# Patient Record
Sex: FEMALE | Race: BLACK OR AFRICAN AMERICAN | Employment: FULL TIME | URBAN - METROPOLITAN AREA
[De-identification: names, ages, dates, MRNs, and addresses within clinical notes are randomized per-mention and may not be internally consistent; named-entity substitution may affect disease eponyms.]

---

## 2017-03-30 PROBLEM — R09.89 ABNORMAL CAROTID PULSE: Status: ACTIVE | Noted: 2017-03-30

## 2018-05-25 ENCOUNTER — HOSPITAL ENCOUNTER (EMERGENCY)
Age: 53
Discharge: HOME | End: 2018-05-25
Payer: COMMERCIAL

## 2018-05-25 ENCOUNTER — APPOINTMENT (OUTPATIENT)
Dept: GENERAL RADIOLOGY | Age: 53
End: 2018-05-25
Payer: COMMERCIAL

## 2018-05-25 VITALS
DIASTOLIC BLOOD PRESSURE: 84 MMHG | BODY MASS INDEX: 47.8 KG/M2 | RESPIRATION RATE: 15 BRPM | HEART RATE: 76 BPM | WEIGHT: 280 LBS | TEMPERATURE: 98 F | OXYGEN SATURATION: 97 % | SYSTOLIC BLOOD PRESSURE: 178 MMHG | HEIGHT: 64 IN

## 2018-05-25 DIAGNOSIS — Z76.0 ENCOUNTER FOR MEDICATION REFILL: ICD-10-CM

## 2018-05-25 DIAGNOSIS — M17.12 PRIMARY OSTEOARTHRITIS OF LEFT KNEE: ICD-10-CM

## 2018-05-25 DIAGNOSIS — M25.562 ACUTE PAIN OF LEFT KNEE: Primary | ICD-10-CM

## 2018-05-25 DIAGNOSIS — F32.A DEPRESSION: ICD-10-CM

## 2018-05-25 DIAGNOSIS — I10 HYPERTENSION: ICD-10-CM

## 2018-05-25 DIAGNOSIS — K21.9 GERD (GASTROESOPHAGEAL REFLUX DISEASE): ICD-10-CM

## 2018-05-25 PROCEDURE — 73564 X-RAY EXAM KNEE 4 OR MORE: CPT

## 2018-05-25 PROCEDURE — 6370000000 HC RX 637 (ALT 250 FOR IP): Performed by: NURSE PRACTITIONER

## 2018-05-25 PROCEDURE — 99283 EMERGENCY DEPT VISIT LOW MDM: CPT

## 2018-05-25 RX ORDER — HYDROCHLOROTHIAZIDE 25 MG/1
25 TABLET ORAL DAILY
Qty: 30 TABLET | Refills: 0 | Status: SHIPPED | OUTPATIENT
Start: 2018-05-25 | End: 2022-10-30

## 2018-05-25 RX ORDER — HYDROCODONE BITARTRATE AND ACETAMINOPHEN 5; 325 MG/1; MG/1
1 TABLET ORAL ONCE
Status: COMPLETED | OUTPATIENT
Start: 2018-05-25 | End: 2018-05-25

## 2018-05-25 RX ORDER — IBUPROFEN 800 MG/1
800 TABLET ORAL ONCE
Status: COMPLETED | OUTPATIENT
Start: 2018-05-25 | End: 2018-05-25

## 2018-05-25 RX ORDER — IBUPROFEN 800 MG/1
800 TABLET ORAL EVERY 8 HOURS PRN
Qty: 21 TABLET | Refills: 0 | Status: SHIPPED | OUTPATIENT
Start: 2018-05-25 | End: 2018-06-01

## 2018-05-25 RX ORDER — AMLODIPINE BESYLATE 10 MG/1
10 TABLET ORAL DAILY
Qty: 30 TABLET | Refills: 0 | Status: SHIPPED | OUTPATIENT
Start: 2018-05-25 | End: 2022-10-30

## 2018-05-25 RX ADMIN — IBUPROFEN 800 MG: 800 TABLET ORAL at 11:58

## 2018-05-25 RX ADMIN — HYDROCODONE BITARTRATE AND ACETAMINOPHEN 1 TABLET: 5; 325 TABLET ORAL at 11:58

## 2018-05-25 ASSESSMENT — PAIN DESCRIPTION - DESCRIPTORS: DESCRIPTORS: ACHING

## 2018-05-25 ASSESSMENT — PAIN DESCRIPTION - PAIN TYPE: TYPE: ACUTE PAIN

## 2018-05-25 ASSESSMENT — PAIN DESCRIPTION - LOCATION: LOCATION: KNEE

## 2018-05-25 ASSESSMENT — PAIN SCALES - GENERAL
PAINLEVEL_OUTOF10: 10
PAINLEVEL_OUTOF10: 8

## 2018-05-25 ASSESSMENT — PAIN DESCRIPTION - FREQUENCY: FREQUENCY: CONTINUOUS

## 2018-05-25 ASSESSMENT — PAIN DESCRIPTION - ORIENTATION: ORIENTATION: LEFT

## 2019-02-26 ENCOUNTER — HOSPITAL ENCOUNTER (EMERGENCY)
Age: 54
Discharge: HOME | End: 2019-02-26

## 2019-02-26 VITALS
WEIGHT: 208 LBS | DIASTOLIC BLOOD PRESSURE: 93 MMHG | TEMPERATURE: 99.6 F | BODY MASS INDEX: 34.66 KG/M2 | HEART RATE: 107 BPM | HEIGHT: 65 IN | OXYGEN SATURATION: 97 % | RESPIRATION RATE: 20 BRPM | SYSTOLIC BLOOD PRESSURE: 161 MMHG

## 2019-02-26 DIAGNOSIS — K04.7 DENTAL ABSCESS: Primary | ICD-10-CM

## 2019-02-26 PROCEDURE — 99282 EMERGENCY DEPT VISIT SF MDM: CPT

## 2022-10-30 ENCOUNTER — HOSPITAL ENCOUNTER (INPATIENT)
Age: 57
LOS: 1 days | Discharge: HOME | DRG: 291 | End: 2022-11-02
Attending: EMERGENCY MEDICINE | Admitting: INTERNAL MEDICINE
Payer: COMMERCIAL

## 2022-10-30 ENCOUNTER — APPOINTMENT (OUTPATIENT)
Dept: GENERAL RADIOLOGY | Age: 57
DRG: 291 | End: 2022-10-30
Payer: COMMERCIAL

## 2022-10-30 ENCOUNTER — APPOINTMENT (OUTPATIENT)
Dept: NUCLEAR MEDICINE | Age: 57
DRG: 291 | End: 2022-10-30
Payer: COMMERCIAL

## 2022-10-30 DIAGNOSIS — I50.9 ACUTE CONGESTIVE HEART FAILURE, UNSPECIFIED HEART FAILURE TYPE (HCC): ICD-10-CM

## 2022-10-30 DIAGNOSIS — F32.A DEPRESSION: ICD-10-CM

## 2022-10-30 DIAGNOSIS — J98.01 ACUTE BRONCHOSPASM: ICD-10-CM

## 2022-10-30 DIAGNOSIS — J30.9 ALLERGIC RHINITIS: ICD-10-CM

## 2022-10-30 DIAGNOSIS — J10.1 INFLUENZA B: Primary | ICD-10-CM

## 2022-10-30 DIAGNOSIS — J45.909 ASTHMA: ICD-10-CM

## 2022-10-30 DIAGNOSIS — J81.0 ACUTE PULMONARY EDEMA (HCC): ICD-10-CM

## 2022-10-30 PROBLEM — I50.43 CHF (CONGESTIVE HEART FAILURE), NYHA CLASS I, ACUTE ON CHRONIC, COMBINED (HCC): Status: ACTIVE | Noted: 2022-10-30

## 2022-10-30 LAB
ACETAMINOPHEN LEVEL: <5 MCG/ML (ref 10–30)
ALBUMIN SERPL-MCNC: 3.9 G/DL (ref 3.5–5.2)
ALP BLD-CCNC: 97 U/L (ref 35–104)
ALT SERPL-CCNC: 11 U/L (ref 0–32)
AMPHETAMINE SCREEN, URINE: NOT DETECTED
ANION GAP SERPL CALCULATED.3IONS-SCNC: 10 MMOL/L (ref 7–16)
ANION GAP SERPL CALCULATED.3IONS-SCNC: 14 MMOL/L (ref 7–16)
AST SERPL-CCNC: 17 U/L (ref 0–31)
BARBITURATE SCREEN URINE: NOT DETECTED
BASOPHILS ABSOLUTE: 0.04 E9/L (ref 0–0.2)
BASOPHILS RELATIVE PERCENT: 0.8 % (ref 0–2)
BENZODIAZEPINE SCREEN, URINE: NOT DETECTED
BILIRUB SERPL-MCNC: 0.5 MG/DL (ref 0–1.2)
BUN BLDV-MCNC: 10 MG/DL (ref 6–20)
BUN BLDV-MCNC: 11 MG/DL (ref 6–20)
C-REACTIVE PROTEIN: 0.3 MG/DL (ref 0–0.4)
CALCIUM SERPL-MCNC: 9.3 MG/DL (ref 8.6–10.2)
CALCIUM SERPL-MCNC: 9.4 MG/DL (ref 8.6–10.2)
CANNABINOID SCREEN URINE: NOT DETECTED
CHLORIDE BLD-SCNC: 105 MMOL/L (ref 98–107)
CHLORIDE BLD-SCNC: 110 MMOL/L (ref 98–107)
CHOLESTEROL, TOTAL: 252 MG/DL (ref 0–199)
CO2: 23 MMOL/L (ref 22–29)
CO2: 24 MMOL/L (ref 22–29)
COCAINE METABOLITE SCREEN URINE: NOT DETECTED
CREAT SERPL-MCNC: 0.7 MG/DL (ref 0.5–1)
CREAT SERPL-MCNC: 0.7 MG/DL (ref 0.5–1)
D DIMER: 934 NG/ML DDU
EOSINOPHILS ABSOLUTE: 0.22 E9/L (ref 0.05–0.5)
EOSINOPHILS RELATIVE PERCENT: 4.2 % (ref 0–6)
ETHANOL: <10 MG/DL (ref 0–0.08)
FENTANYL SCREEN, URINE: NOT DETECTED
GFR SERPL CREATININE-BSD FRML MDRD: >60 ML/MIN/1.73
GFR SERPL CREATININE-BSD FRML MDRD: >60 ML/MIN/1.73
GLUCOSE BLD-MCNC: 162 MG/DL (ref 74–99)
GLUCOSE BLD-MCNC: 91 MG/DL (ref 74–99)
HBA1C MFR BLD: 5.1 % (ref 4–5.6)
HCT VFR BLD CALC: 33.1 % (ref 34–48)
HDLC SERPL-MCNC: 86 MG/DL
HEMOGLOBIN: 10.9 G/DL (ref 11.5–15.5)
IMMATURE GRANULOCYTES #: 0.02 E9/L
IMMATURE GRANULOCYTES %: 0.4 % (ref 0–5)
INFLUENZA A BY PCR: NOT DETECTED
INFLUENZA B BY PCR: DETECTED
LDL CHOLESTEROL CALCULATED: 154 MG/DL (ref 0–99)
LYMPHOCYTES ABSOLUTE: 2.06 E9/L (ref 1.5–4)
LYMPHOCYTES RELATIVE PERCENT: 38.9 % (ref 20–42)
Lab: NORMAL
MCH RBC QN AUTO: 29.4 PG (ref 26–35)
MCHC RBC AUTO-ENTMCNC: 32.9 % (ref 32–34.5)
MCV RBC AUTO: 89.2 FL (ref 80–99.9)
METHADONE SCREEN, URINE: NOT DETECTED
MONOCYTES ABSOLUTE: 0.49 E9/L (ref 0.1–0.95)
MONOCYTES RELATIVE PERCENT: 9.3 % (ref 2–12)
NEUTROPHILS ABSOLUTE: 2.46 E9/L (ref 1.8–7.3)
NEUTROPHILS RELATIVE PERCENT: 46.4 % (ref 43–80)
OPIATE SCREEN URINE: NOT DETECTED
OXYCODONE URINE: NOT DETECTED
PDW BLD-RTO: 12.7 FL (ref 11.5–15)
PHENCYCLIDINE SCREEN URINE: NOT DETECTED
PLATELET # BLD: 235 E9/L (ref 130–450)
PMV BLD AUTO: 11.4 FL (ref 7–12)
POTASSIUM REFLEX MAGNESIUM: 3.8 MMOL/L (ref 3.5–5)
POTASSIUM SERPL-SCNC: 3.5 MMOL/L (ref 3.5–5)
PRO-BNP: 1931 PG/ML (ref 0–125)
PROCALCITONIN: 0.1 NG/ML (ref 0–0.08)
RBC # BLD: 3.71 E12/L (ref 3.5–5.5)
SALICYLATE, SERUM: <0.3 MG/DL (ref 0–30)
SARS-COV-2, NAAT: NOT DETECTED
SODIUM BLD-SCNC: 142 MMOL/L (ref 132–146)
SODIUM BLD-SCNC: 144 MMOL/L (ref 132–146)
TOTAL PROTEIN: 7.2 G/DL (ref 6.4–8.3)
TRICYCLIC ANTIDEPRESSANTS SCREEN SERUM: NEGATIVE NG/ML
TRIGL SERPL-MCNC: 59 MG/DL (ref 0–149)
TROPONIN, HIGH SENSITIVITY: 10 NG/L (ref 0–9)
TROPONIN, HIGH SENSITIVITY: 13 NG/L (ref 0–9)
TROPONIN, HIGH SENSITIVITY: 13 NG/L (ref 0–9)
TSH SERPL DL<=0.05 MIU/L-ACNC: 0.78 UIU/ML (ref 0.27–4.2)
VLDLC SERPL CALC-MCNC: 12 MG/DL
WBC # BLD: 5.3 E9/L (ref 4.5–11.5)

## 2022-10-30 PROCEDURE — 82077 ASSAY SPEC XCP UR&BREATH IA: CPT

## 2022-10-30 PROCEDURE — 6370000000 HC RX 637 (ALT 250 FOR IP): Performed by: STUDENT IN AN ORGANIZED HEALTH CARE EDUCATION/TRAINING PROGRAM

## 2022-10-30 PROCEDURE — 36415 COLL VENOUS BLD VENIPUNCTURE: CPT

## 2022-10-30 PROCEDURE — 84484 ASSAY OF TROPONIN QUANT: CPT

## 2022-10-30 PROCEDURE — 93005 ELECTROCARDIOGRAM TRACING: CPT | Performed by: EMERGENCY MEDICINE

## 2022-10-30 PROCEDURE — 94640 AIRWAY INHALATION TREATMENT: CPT

## 2022-10-30 PROCEDURE — 71046 X-RAY EXAM CHEST 2 VIEWS: CPT

## 2022-10-30 PROCEDURE — 80307 DRUG TEST PRSMV CHEM ANLYZR: CPT

## 2022-10-30 PROCEDURE — 6370000000 HC RX 637 (ALT 250 FOR IP): Performed by: EMERGENCY MEDICINE

## 2022-10-30 PROCEDURE — 85378 FIBRIN DEGRADE SEMIQUANT: CPT

## 2022-10-30 PROCEDURE — 80048 BASIC METABOLIC PNL TOTAL CA: CPT

## 2022-10-30 PROCEDURE — 80179 DRUG ASSAY SALICYLATE: CPT

## 2022-10-30 PROCEDURE — 3430000000 HC RX DIAGNOSTIC RADIOPHARMACEUTICAL: Performed by: RADIOLOGY

## 2022-10-30 PROCEDURE — 6360000002 HC RX W HCPCS: Performed by: STUDENT IN AN ORGANIZED HEALTH CARE EDUCATION/TRAINING PROGRAM

## 2022-10-30 PROCEDURE — 80053 COMPREHEN METABOLIC PANEL: CPT

## 2022-10-30 PROCEDURE — G0378 HOSPITAL OBSERVATION PER HR: HCPCS

## 2022-10-30 PROCEDURE — 84443 ASSAY THYROID STIM HORMONE: CPT

## 2022-10-30 PROCEDURE — 96375 TX/PRO/DX INJ NEW DRUG ADDON: CPT

## 2022-10-30 PROCEDURE — 86140 C-REACTIVE PROTEIN: CPT

## 2022-10-30 PROCEDURE — 83036 HEMOGLOBIN GLYCOSYLATED A1C: CPT

## 2022-10-30 PROCEDURE — 85025 COMPLETE CBC W/AUTO DIFF WBC: CPT

## 2022-10-30 PROCEDURE — A9540 TC99M MAA: HCPCS | Performed by: RADIOLOGY

## 2022-10-30 PROCEDURE — 80143 DRUG ASSAY ACETAMINOPHEN: CPT

## 2022-10-30 PROCEDURE — 80061 LIPID PANEL: CPT

## 2022-10-30 PROCEDURE — 2580000003 HC RX 258: Performed by: STUDENT IN AN ORGANIZED HEALTH CARE EDUCATION/TRAINING PROGRAM

## 2022-10-30 PROCEDURE — 6360000002 HC RX W HCPCS: Performed by: EMERGENCY MEDICINE

## 2022-10-30 PROCEDURE — 99285 EMERGENCY DEPT VISIT HI MDM: CPT

## 2022-10-30 PROCEDURE — A9539 TC99M PENTETATE: HCPCS | Performed by: RADIOLOGY

## 2022-10-30 PROCEDURE — 96374 THER/PROPH/DIAG INJ IV PUSH: CPT

## 2022-10-30 PROCEDURE — 78582 LUNG VENTILAT&PERFUS IMAGING: CPT

## 2022-10-30 PROCEDURE — 83880 ASSAY OF NATRIURETIC PEPTIDE: CPT

## 2022-10-30 PROCEDURE — 84145 PROCALCITONIN (PCT): CPT

## 2022-10-30 PROCEDURE — 87502 INFLUENZA DNA AMP PROBE: CPT

## 2022-10-30 PROCEDURE — 87635 SARS-COV-2 COVID-19 AMP PRB: CPT

## 2022-10-30 RX ORDER — ONDANSETRON 4 MG/1
4 TABLET, ORALLY DISINTEGRATING ORAL EVERY 8 HOURS PRN
Status: DISCONTINUED | OUTPATIENT
Start: 2022-10-30 | End: 2022-11-02 | Stop reason: HOSPADM

## 2022-10-30 RX ORDER — ACETAMINOPHEN 325 MG/1
650 TABLET ORAL EVERY 6 HOURS PRN
Status: DISCONTINUED | OUTPATIENT
Start: 2022-10-30 | End: 2022-11-02 | Stop reason: HOSPADM

## 2022-10-30 RX ORDER — ASPIRIN 325 MG
325 TABLET ORAL ONCE
Status: COMPLETED | OUTPATIENT
Start: 2022-10-30 | End: 2022-10-30

## 2022-10-30 RX ORDER — SODIUM CHLORIDE 9 MG/ML
INJECTION, SOLUTION INTRAVENOUS PRN
Status: DISCONTINUED | OUTPATIENT
Start: 2022-10-30 | End: 2022-11-02 | Stop reason: HOSPADM

## 2022-10-30 RX ORDER — EZETIMIBE 10 MG/1
10 TABLET ORAL NIGHTLY
Status: DISCONTINUED | OUTPATIENT
Start: 2022-10-30 | End: 2022-10-30

## 2022-10-30 RX ORDER — DEXTROSE MONOHYDRATE 100 MG/ML
INJECTION, SOLUTION INTRAVENOUS CONTINUOUS PRN
Status: DISCONTINUED | OUTPATIENT
Start: 2022-10-30 | End: 2022-11-02 | Stop reason: HOSPADM

## 2022-10-30 RX ORDER — SODIUM CHLORIDE 0.9 % (FLUSH) 0.9 %
5-40 SYRINGE (ML) INJECTION EVERY 12 HOURS SCHEDULED
Status: DISCONTINUED | OUTPATIENT
Start: 2022-10-30 | End: 2022-11-02 | Stop reason: HOSPADM

## 2022-10-30 RX ORDER — ENOXAPARIN SODIUM 100 MG/ML
30 INJECTION SUBCUTANEOUS 2 TIMES DAILY
Status: COMPLETED | OUTPATIENT
Start: 2022-10-30 | End: 2022-11-01

## 2022-10-30 RX ORDER — FUROSEMIDE 10 MG/ML
40 INJECTION INTRAMUSCULAR; INTRAVENOUS DAILY
Status: DISCONTINUED | OUTPATIENT
Start: 2022-10-31 | End: 2022-11-02 | Stop reason: HOSPADM

## 2022-10-30 RX ORDER — IPRATROPIUM BROMIDE AND ALBUTEROL SULFATE 2.5; .5 MG/3ML; MG/3ML
1 SOLUTION RESPIRATORY (INHALATION)
Status: DISCONTINUED | OUTPATIENT
Start: 2022-10-30 | End: 2022-11-01

## 2022-10-30 RX ORDER — BUDESONIDE 0.5 MG/2ML
0.5 INHALANT ORAL 2 TIMES DAILY
Status: DISCONTINUED | OUTPATIENT
Start: 2022-10-30 | End: 2022-11-02 | Stop reason: HOSPADM

## 2022-10-30 RX ORDER — ACETAMINOPHEN 650 MG/1
650 SUPPOSITORY RECTAL EVERY 6 HOURS PRN
Status: DISCONTINUED | OUTPATIENT
Start: 2022-10-30 | End: 2022-11-02 | Stop reason: HOSPADM

## 2022-10-30 RX ORDER — LABETALOL HYDROCHLORIDE 5 MG/ML
20 INJECTION, SOLUTION INTRAVENOUS ONCE
Status: COMPLETED | OUTPATIENT
Start: 2022-10-30 | End: 2022-10-30

## 2022-10-30 RX ORDER — SODIUM CHLORIDE 0.9 % (FLUSH) 0.9 %
5-40 SYRINGE (ML) INJECTION PRN
Status: DISCONTINUED | OUTPATIENT
Start: 2022-10-30 | End: 2022-11-02 | Stop reason: HOSPADM

## 2022-10-30 RX ORDER — ROSUVASTATIN CALCIUM 20 MG/1
20 TABLET, COATED ORAL NIGHTLY
Status: DISCONTINUED | OUTPATIENT
Start: 2022-10-30 | End: 2022-11-02 | Stop reason: HOSPADM

## 2022-10-30 RX ORDER — FUROSEMIDE 10 MG/ML
40 INJECTION INTRAMUSCULAR; INTRAVENOUS ONCE
Status: COMPLETED | OUTPATIENT
Start: 2022-10-30 | End: 2022-10-30

## 2022-10-30 RX ORDER — POLYETHYLENE GLYCOL 3350 17 G/17G
17 POWDER, FOR SOLUTION ORAL DAILY PRN
Status: DISCONTINUED | OUTPATIENT
Start: 2022-10-30 | End: 2022-11-02 | Stop reason: HOSPADM

## 2022-10-30 RX ORDER — KIT FOR THE PREPARATION OF TECHNETIUM TC 99M PENTETATE 20 MG/1
34.3 INJECTION, POWDER, LYOPHILIZED, FOR SOLUTION INTRAVENOUS; RESPIRATORY (INHALATION)
Status: COMPLETED | OUTPATIENT
Start: 2022-10-30 | End: 2022-10-30

## 2022-10-30 RX ORDER — HYDRALAZINE HYDROCHLORIDE 20 MG/ML
10 INJECTION INTRAMUSCULAR; INTRAVENOUS EVERY 6 HOURS PRN
Status: DISCONTINUED | OUTPATIENT
Start: 2022-10-30 | End: 2022-11-02 | Stop reason: HOSPADM

## 2022-10-30 RX ORDER — METHYLPREDNISOLONE SODIUM SUCCINATE 125 MG/2ML
125 INJECTION, POWDER, LYOPHILIZED, FOR SOLUTION INTRAMUSCULAR; INTRAVENOUS ONCE
Status: COMPLETED | OUTPATIENT
Start: 2022-10-30 | End: 2022-10-30

## 2022-10-30 RX ORDER — ONDANSETRON 2 MG/ML
4 INJECTION INTRAMUSCULAR; INTRAVENOUS EVERY 6 HOURS PRN
Status: DISCONTINUED | OUTPATIENT
Start: 2022-10-30 | End: 2022-11-02 | Stop reason: HOSPADM

## 2022-10-30 RX ORDER — ARFORMOTEROL TARTRATE 15 UG/2ML
15 SOLUTION RESPIRATORY (INHALATION) 2 TIMES DAILY
Status: DISCONTINUED | OUTPATIENT
Start: 2022-10-30 | End: 2022-11-02 | Stop reason: HOSPADM

## 2022-10-30 RX ORDER — LABETALOL HYDROCHLORIDE 5 MG/ML
10 INJECTION, SOLUTION INTRAVENOUS EVERY 4 HOURS PRN
Status: DISCONTINUED | OUTPATIENT
Start: 2022-10-30 | End: 2022-11-02 | Stop reason: HOSPADM

## 2022-10-30 RX ORDER — IPRATROPIUM BROMIDE AND ALBUTEROL SULFATE 2.5; .5 MG/3ML; MG/3ML
3 SOLUTION RESPIRATORY (INHALATION) ONCE
Status: COMPLETED | OUTPATIENT
Start: 2022-10-30 | End: 2022-10-30

## 2022-10-30 RX ADMIN — HYDRALAZINE HYDROCHLORIDE 10 MG: 20 INJECTION INTRAMUSCULAR; INTRAVENOUS at 17:26

## 2022-10-30 RX ADMIN — IPRATROPIUM BROMIDE AND ALBUTEROL SULFATE 3 AMPULE: .5; 3 SOLUTION RESPIRATORY (INHALATION) at 11:02

## 2022-10-30 RX ADMIN — ACETAMINOPHEN 650 MG: 325 TABLET, FILM COATED ORAL at 16:47

## 2022-10-30 RX ADMIN — ARFORMOTEROL TARTRATE 15 MCG: 15 SOLUTION RESPIRATORY (INHALATION) at 20:43

## 2022-10-30 RX ADMIN — ROSUVASTATIN 20 MG: 20 TABLET, FILM COATED ORAL at 21:37

## 2022-10-30 RX ADMIN — Medication 5.5 MILLICURIE: at 13:45

## 2022-10-30 RX ADMIN — Medication 10 ML: at 21:39

## 2022-10-30 RX ADMIN — BUDESONIDE INHALATION SUSPENSION 500 MCG: 0.5 SUSPENSION RESPIRATORY (INHALATION) at 20:42

## 2022-10-30 RX ADMIN — ENOXAPARIN SODIUM 30 MG: 100 INJECTION SUBCUTANEOUS at 21:38

## 2022-10-30 RX ADMIN — LABETALOL HYDROCHLORIDE 10 MG: 5 INJECTION, SOLUTION INTRAVENOUS at 16:48

## 2022-10-30 RX ADMIN — IPRATROPIUM BROMIDE AND ALBUTEROL SULFATE 1 AMPULE: .5; 2.5 SOLUTION RESPIRATORY (INHALATION) at 20:42

## 2022-10-30 RX ADMIN — METHYLPREDNISOLONE SODIUM SUCCINATE 125 MG: 125 INJECTION, POWDER, FOR SOLUTION INTRAMUSCULAR; INTRAVENOUS at 10:50

## 2022-10-30 RX ADMIN — LABETALOL HYDROCHLORIDE 20 MG: 5 INJECTION, SOLUTION INTRAVENOUS at 14:24

## 2022-10-30 RX ADMIN — ASPIRIN 325 MG: 325 TABLET ORAL at 09:49

## 2022-10-30 RX ADMIN — FUROSEMIDE 40 MG: 10 INJECTION, SOLUTION INTRAMUSCULAR; INTRAVENOUS at 21:38

## 2022-10-30 RX ADMIN — ACETAMINOPHEN 650 MG: 325 TABLET, FILM COATED ORAL at 21:51

## 2022-10-30 RX ADMIN — KIT FOR THE PREPARATION OF TECHNETIUM TC 99M PENTETATE 34.3 MILLICURIE: 20 INJECTION, POWDER, LYOPHILIZED, FOR SOLUTION INTRAVENOUS; RESPIRATORY (INHALATION) at 13:39

## 2022-10-30 RX ADMIN — HYDRALAZINE HYDROCHLORIDE 10 MG: 20 INJECTION INTRAMUSCULAR; INTRAVENOUS at 21:51

## 2022-10-30 RX ADMIN — FUROSEMIDE 40 MG: 10 INJECTION, SOLUTION INTRAMUSCULAR; INTRAVENOUS at 14:28

## 2022-10-30 ASSESSMENT — PAIN - FUNCTIONAL ASSESSMENT: PAIN_FUNCTIONAL_ASSESSMENT: NONE - DENIES PAIN

## 2022-10-30 ASSESSMENT — PAIN SCALES - GENERAL
PAINLEVEL_OUTOF10: 8
PAINLEVEL_OUTOF10: 6

## 2022-10-30 NOTE — Clinical Note
Discharge Plan[de-identified] Other/[facility])   Telemetry/Cardiac Monitoring Required?: Yes

## 2022-10-30 NOTE — ED PROVIDER NOTES
Department of Emergency Medicine   ED  Provider Note  Admit Date/RoomTime: 10/30/2022  8:54 AM  ED Room: [facility]          History of Present Illness:  10/30/22, Time: 9:17 AM EDT  Chief Complaint   Patient presents with    Shortness of Breath     Started 2 weeks ago    Cough     Pt had fever and cough and headache 3 weeks ago and it has turned into SOB. Pt stopped all meds in 2018                Maria R Herrera is a 59 y.o. female presenting to the ED for shortness of breath, beginning 2-3 weeks ago. The complaint has been constant, moderate in severity, and worsened by activity. Patient reports worsening shortness of breath for the last 2 weeks. She reports nonproductive cough with wheezing but also reports orthopnea and fluid retention. She says she is getting worse. She says she does not know what is wrong. She reports that she seems to becoming more short of breath and having increased tightness in her chest for the last 3 weeks. She said 3 weeks ago she had a fever but nothing over the last 2 weeks. She reports she seems to be congested. She denies abdominal pain. She denies back pain. She denies any history of DVT or PE. She has a history of asthma. Review of Systems:   A complete review of systems was performed and pertinent positives and negatives are stated within HPI, all other systems reviewed and are negative.        --------------------------------------------- PAST HISTORY ---------------------------------------------  Past Medical History:  has a past medical history of Abnormal carotid pulse, Asthma, Depression, Dyslipidemia, Hypertension, Mitral valve prolapse, Obesity, and Osteoarthritis. Past Surgical History:  has a past surgical history that includes Hysterectomy (); knee surgery; Tonsillectomy (childhood);  section; Incisional hernia repair (12); and Abdomen surgery. Social History:  reports that she has never smoked.  She has never used smokeless tobacco. She reports current alcohol use. She reports that she does not use drugs. Family History: family history includes Arthritis in her mother and sister; Asthma in her mother and sister; Cancer in her father; Diabetes in her father, mother, and sister; Heart Disease in her mother; High Blood Pressure in her father, mother, sister, sister, sister, and sister; Mental Illness in her father and sister; Stroke in her father and mother. . Unless otherwise noted, family history is non contributory    The patients home medications have been reviewed. Allergies: Doxycycline, Iodine, and Iodides    I have reviewed the past medical history, past surgical history, social history, and family history    ---------------------------------------------------PHYSICAL EXAM--------------------------------------    Constitutional/General: Alert and oriented x3  Head: Normocephalic and atraumatic  Eyes: PERRL, EOMI, sclera non icteric  ENT: Oropharynx clear, handling secretions, no trismus  Neck: Supple, full ROM, no stridor, no meningeal signs  Respiratory: Lungs scattered wheezes bilaterally and rales bilaterally. Not in respiratory distress  Cardiovascular:  Regular rate. Regular rhythm. No murmurs, no gallops, no rubs. 2+ distal pulses. Equal extremity pulses. Gastrointestinal:  Abdomen Soft, Non tender, Non distended. No rebound, guarding, or rigidity. No pulsatile masses. Musculoskeletal: Moves all extremities x 4. Warm and well perfused, no clubbing, no cyanosis, no edema. Capillary refill <3 seconds  Skin: skin warm and dry. No rashes. Neurologic: GCS 15, no focal deficits, symmetric strength 5/5 in the upper and lower extremities bilaterally  Psychiatric: Normal Affect          -------------------------------------------------- RESULTS -------------------------------------------------  Results are listed below.      LABS: (Lab results interpreted by me)  Results for orders placed or performed during the hospital encounter of 10/30/22   COVID-19, Rapid    Specimen: Nasopharyngeal Swab   Result Value Ref Range    SARS-CoV-2, NAAT Not Detected Not Detected   RAPID INFLUENZA A/B ANTIGENS    Specimen: Nasopharyngeal   Result Value Ref Range    Influenza A by PCR Not Detected Not Detected    Influenza B by PCR DETECTED (A) Not Detected   CBC with Auto Differential   Result Value Ref Range    WBC 5.3 4.5 - 11.5 E9/L    RBC 3.71 3.50 - 5.50 E12/L    Hemoglobin 10.9 (L) 11.5 - 15.5 g/dL    Hematocrit 33.1 (L) 34.0 - 48.0 %    MCV 89.2 80.0 - 99.9 fL    MCH 29.4 26.0 - 35.0 pg    MCHC 32.9 32.0 - 34.5 %    RDW 12.7 11.5 - 15.0 fL    Platelets 730 650 - 603 E9/L    MPV 11.4 7.0 - 12.0 fL    Neutrophils % 46.4 43.0 - 80.0 %    Immature Granulocytes % 0.4 0.0 - 5.0 %    Lymphocytes % 38.9 20.0 - 42.0 %    Monocytes % 9.3 2.0 - 12.0 %    Eosinophils % 4.2 0.0 - 6.0 %    Basophils % 0.8 0.0 - 2.0 %    Neutrophils Absolute 2.46 1.80 - 7.30 E9/L    Immature Granulocytes # 0.02 E9/L    Lymphocytes Absolute 2.06 1.50 - 4.00 E9/L    Monocytes Absolute 0.49 0.10 - 0.95 E9/L    Eosinophils Absolute 0.22 0.05 - 0.50 E9/L    Basophils Absolute 0.04 0.00 - 0.20 E9/L   Comprehensive Metabolic Panel w/ Reflex to MG   Result Value Ref Range    Sodium 144 132 - 146 mmol/L    Potassium reflex Magnesium 3.8 3.5 - 5.0 mmol/L    Chloride 110 (H) 98 - 107 mmol/L    CO2 24 22 - 29 mmol/L    Anion Gap 10 7 - 16 mmol/L    Glucose 91 74 - 99 mg/dL    BUN 11 6 - 20 mg/dL    Creatinine 0.7 0.5 - 1.0 mg/dL    Est, Glom Filt Rate >60 >=60 mL/min/1.73    Calcium 9.4 8.6 - 10.2 mg/dL    Total Protein 7.2 6.4 - 8.3 g/dL    Albumin 3.9 3.5 - 5.2 g/dL    Total Bilirubin 0.5 0.0 - 1.2 mg/dL    Alkaline Phosphatase 97 35 - 104 U/L    ALT 11 0 - 32 U/L    AST 17 0 - 31 U/L   Troponin   Result Value Ref Range    Troponin, High Sensitivity 13 (H) 0 - 9 ng/L   Brain Natriuretic Peptide   Result Value Ref Range    Pro-BNP 1,931 (H) 0 - 125 pg/mL   D-Dimer, Quantitative Result Value Ref Range    D-Dimer, Quant 934 ng/mL DDU   Troponin   Result Value Ref Range    Troponin, High Sensitivity 13 (H) 0 - 9 ng/L   EKG 12 Lead   Result Value Ref Range    Ventricular Rate 86 BPM    Atrial Rate 86 BPM    P-R Interval 180 ms    QRS Duration 94 ms    Q-T Interval 386 ms    QTc Calculation (Bazett) 461 ms    P Axis 62 degrees    R Axis 21 degrees    T Axis 66 degrees   ,       RADIOLOGY:    Radiologist interpretation  XR CHEST (2 VW)   Final Result   1. Interval change and findings suspicious for low-grade CHF. Details   above. Bronchitis would be included in differential.      2.  Bilateral hilar soft tissue prominence. Consider further correlation   with CT chest.         NM LUNG VENT/PERFUSION (VQ)    (Results Pending)         EKG Interpretation  Interpreted by emergency department physician, Bo Garner MD    Date of EKG: 10/30/22  Time: 0941    Rhythm: normal sinus   Rate: normal  Axis: normal  Conduction: normal  ST Segments: no acute change  T Waves: no acute change    Clinical Impression: Sinus rhythm, no acute ischemic changes  Comparison to prior EKG: stable as compared to patient's most recent EKG      ------------------------- NURSING NOTES AND VITALS REVIEWED ---------------------------   The nursing notes within the ED encounter and vital signs as below have been reviewed by myself  BP (!) 195/117   Pulse 90   Temp 98.4 °F (36.9 °C) (Oral)   Resp 20   Ht 5' 5\" (1.651 m)   Wt 287 lb (130.2 kg)   SpO2 99%   BMI 47.76 kg/m²     Oxygen Saturation Interpretation: Normal    The patients available past medical records and past encounters were reviewed. ------------------------------ ED COURSE/MEDICAL DECISION MAKING----------------------          Oxygen Saturation Interpretation: 99 % on RA.        I, Dr. Kenisha Eisenberg, am the primary provider of record        Medical Decision Making:   Shortness of breath, likely multifactorial.  She is influenza B positive but has been symptomatic for 3 weeks. Likely outside the tamflu window. I am not sure how long the test remains positive but also she has wheezing acute bronchospasm today so nebs and steroids given as well as signs of acute heart failure with pulmonary edema. She is hypertensive as well. No recent evaluation or ech or stress testing. VQ pending as well. Says she has not had a family doctor. Needs admission for further evaluation including echo and testing and treatment. Name and Route of medications administered in the ED:  Medications   furosemide (LASIX) injection 40 mg (has no administration in time range)   labetalol (NORMODYNE;TRANDATE) injection 20 mg (has no administration in time range)   aspirin tablet 325 mg (325 mg Oral Given 10/30/22 0926)   ipratropium-albuterol (DUONEB) nebulizer solution 3 ampule (3 ampules Inhalation Given 10/30/22 1102)   methylPREDNISolone sodium (SOLU-MEDROL) injection 125 mg (125 mg IntraVENous Given 10/30/22 1050)   technetium 99m DTPA solution 95.8 millicurie (70.5 millicuries IntraVENous Given 10/30/22 1339)   technetium albumin aggregated (MAA) solution 5.5 millicurie (5.5 millicuries IntraVENous Given 10/30/22 1345)       Re-Evaluations:       No change      This patient's ED course included: a personal history and physicial examination, re-evaluation prior to disposition, IV medications, and complex medical decision making and emergency management    This patient has remained hemodynamically stable during their ED course. Consultations:  House Medicine      Counseling: The emergency provider has spoken with the patient and discussed todays results, in addition to providing specific details for the plan of care and counseling regarding the diagnosis and prognosis. Questions are answered at this time and they are agreeable with the plan.       --------------------------------- IMPRESSION AND DISPOSITION ---------------------------------    IMPRESSION  1.  Influenza B 2. Acute bronchospasm    3. Acute pulmonary edema (HCC)    4. Acute congestive heart failure, unspecified heart failure type ([facility] [address]        DISPOSITION  Disposition: Admit to telemetry  Patient condition is stable        NOTE: This report was transcribed using voice recognition software.  Every effort was made to ensure accuracy; however, inadvertent computerized transcription errors may be present        Galileo Licea MD  10/30/22 3417

## 2022-10-31 LAB
ANION GAP SERPL CALCULATED.3IONS-SCNC: 14 MMOL/L (ref 7–16)
ANION GAP SERPL CALCULATED.3IONS-SCNC: 15 MMOL/L (ref 7–16)
BASOPHILS ABSOLUTE: 0 E9/L (ref 0–0.2)
BASOPHILS RELATIVE PERCENT: 0 % (ref 0–2)
BUN BLDV-MCNC: 18 MG/DL (ref 6–20)
BUN BLDV-MCNC: 24 MG/DL (ref 6–20)
CALCIUM SERPL-MCNC: 9.3 MG/DL (ref 8.6–10.2)
CALCIUM SERPL-MCNC: 9.4 MG/DL (ref 8.6–10.2)
CHLORIDE BLD-SCNC: 106 MMOL/L (ref 98–107)
CHLORIDE BLD-SCNC: 108 MMOL/L (ref 98–107)
CO2: 20 MMOL/L (ref 22–29)
CO2: 21 MMOL/L (ref 22–29)
CREAT SERPL-MCNC: 0.9 MG/DL (ref 0.5–1)
CREAT SERPL-MCNC: 1 MG/DL (ref 0.5–1)
EKG ATRIAL RATE: 86 BPM
EKG P AXIS: 62 DEGREES
EKG P-R INTERVAL: 180 MS
EKG Q-T INTERVAL: 386 MS
EKG QRS DURATION: 94 MS
EKG QTC CALCULATION (BAZETT): 461 MS
EKG R AXIS: 21 DEGREES
EKG T AXIS: 66 DEGREES
EKG VENTRICULAR RATE: 86 BPM
EOSINOPHILS ABSOLUTE: 0 E9/L (ref 0.05–0.5)
EOSINOPHILS RELATIVE PERCENT: 0 % (ref 0–6)
GFR SERPL CREATININE-BSD FRML MDRD: >60 ML/MIN/1.73
GFR SERPL CREATININE-BSD FRML MDRD: >60 ML/MIN/1.73
GLUCOSE BLD-MCNC: 112 MG/DL (ref 74–99)
GLUCOSE BLD-MCNC: 133 MG/DL (ref 74–99)
HCT VFR BLD CALC: 32.4 % (ref 34–48)
HEMOGLOBIN: 10.6 G/DL (ref 11.5–15.5)
IMMATURE GRANULOCYTES #: 0.05 E9/L
IMMATURE GRANULOCYTES %: 0.6 % (ref 0–5)
LV EF: 45 %
LVEF MODALITY: NORMAL
LYMPHOCYTES ABSOLUTE: 1.28 E9/L (ref 1.5–4)
LYMPHOCYTES RELATIVE PERCENT: 15.2 % (ref 20–42)
MCH RBC QN AUTO: 29.8 PG (ref 26–35)
MCHC RBC AUTO-ENTMCNC: 32.7 % (ref 32–34.5)
MCV RBC AUTO: 91 FL (ref 80–99.9)
MONOCYTES ABSOLUTE: 0.38 E9/L (ref 0.1–0.95)
MONOCYTES RELATIVE PERCENT: 4.5 % (ref 2–12)
NEUTROPHILS ABSOLUTE: 6.73 E9/L (ref 1.8–7.3)
NEUTROPHILS RELATIVE PERCENT: 79.7 % (ref 43–80)
PDW BLD-RTO: 12.8 FL (ref 11.5–15)
PLATELET # BLD: 250 E9/L (ref 130–450)
PMV BLD AUTO: 11.9 FL (ref 7–12)
POTASSIUM SERPL-SCNC: 3.7 MMOL/L (ref 3.5–5)
POTASSIUM SERPL-SCNC: 3.9 MMOL/L (ref 3.5–5)
RBC # BLD: 3.56 E12/L (ref 3.5–5.5)
SODIUM BLD-SCNC: 140 MMOL/L (ref 132–146)
SODIUM BLD-SCNC: 144 MMOL/L (ref 132–146)
VITAMIN D 25-HYDROXY: <5 NG/ML (ref 30–100)
WBC # BLD: 8.4 E9/L (ref 4.5–11.5)

## 2022-10-31 PROCEDURE — 85025 COMPLETE CBC W/AUTO DIFF WBC: CPT

## 2022-10-31 PROCEDURE — G0378 HOSPITAL OBSERVATION PER HR: HCPCS

## 2022-10-31 PROCEDURE — 99224 PR SBSQ OBSERVATION CARE/DAY 15 MINUTES: CPT | Performed by: INTERNAL MEDICINE

## 2022-10-31 PROCEDURE — 93010 ELECTROCARDIOGRAM REPORT: CPT | Performed by: INTERNAL MEDICINE

## 2022-10-31 PROCEDURE — 6360000002 HC RX W HCPCS: Performed by: STUDENT IN AN ORGANIZED HEALTH CARE EDUCATION/TRAINING PROGRAM

## 2022-10-31 PROCEDURE — 94640 AIRWAY INHALATION TREATMENT: CPT

## 2022-10-31 PROCEDURE — 6370000000 HC RX 637 (ALT 250 FOR IP)

## 2022-10-31 PROCEDURE — 6370000000 HC RX 637 (ALT 250 FOR IP): Performed by: STUDENT IN AN ORGANIZED HEALTH CARE EDUCATION/TRAINING PROGRAM

## 2022-10-31 PROCEDURE — 80048 BASIC METABOLIC PNL TOTAL CA: CPT

## 2022-10-31 PROCEDURE — 93306 TTE W/DOPPLER COMPLETE: CPT

## 2022-10-31 PROCEDURE — 2580000003 HC RX 258: Performed by: STUDENT IN AN ORGANIZED HEALTH CARE EDUCATION/TRAINING PROGRAM

## 2022-10-31 PROCEDURE — 82306 VITAMIN D 25 HYDROXY: CPT

## 2022-10-31 PROCEDURE — 36415 COLL VENOUS BLD VENIPUNCTURE: CPT

## 2022-10-31 RX ORDER — LISINOPRIL 10 MG/1
10 TABLET ORAL DAILY
Status: DISCONTINUED | OUTPATIENT
Start: 2022-11-01 | End: 2022-11-01

## 2022-10-31 RX ORDER — ERGOCALCIFEROL 1.25 MG/1
50000 CAPSULE ORAL WEEKLY
Status: DISCONTINUED | OUTPATIENT
Start: 2022-10-31 | End: 2022-11-02 | Stop reason: HOSPADM

## 2022-10-31 RX ORDER — AMLODIPINE BESYLATE 10 MG/1
10 TABLET ORAL DAILY
Status: DISCONTINUED | OUTPATIENT
Start: 2022-10-31 | End: 2022-10-31

## 2022-10-31 RX ADMIN — BUDESONIDE INHALATION SUSPENSION 500 MCG: 0.5 SUSPENSION RESPIRATORY (INHALATION) at 08:27

## 2022-10-31 RX ADMIN — HYDRALAZINE HYDROCHLORIDE 10 MG: 20 INJECTION INTRAMUSCULAR; INTRAVENOUS at 06:39

## 2022-10-31 RX ADMIN — IPRATROPIUM BROMIDE AND ALBUTEROL SULFATE 1 AMPULE: .5; 2.5 SOLUTION RESPIRATORY (INHALATION) at 12:30

## 2022-10-31 RX ADMIN — ARFORMOTEROL TARTRATE 15 MCG: 15 SOLUTION RESPIRATORY (INHALATION) at 08:27

## 2022-10-31 RX ADMIN — FUROSEMIDE 40 MG: 10 INJECTION, SOLUTION INTRAMUSCULAR; INTRAVENOUS at 10:03

## 2022-10-31 RX ADMIN — Medication 10 ML: at 20:37

## 2022-10-31 RX ADMIN — ENOXAPARIN SODIUM 30 MG: 100 INJECTION SUBCUTANEOUS at 10:03

## 2022-10-31 RX ADMIN — ERGOCALCIFEROL 50000 UNITS: 1.25 CAPSULE ORAL at 15:11

## 2022-10-31 RX ADMIN — IPRATROPIUM BROMIDE AND ALBUTEROL SULFATE 1 AMPULE: .5; 2.5 SOLUTION RESPIRATORY (INHALATION) at 19:44

## 2022-10-31 RX ADMIN — ROSUVASTATIN 20 MG: 20 TABLET, FILM COATED ORAL at 20:33

## 2022-10-31 RX ADMIN — BUDESONIDE INHALATION SUSPENSION 500 MCG: 0.5 SUSPENSION RESPIRATORY (INHALATION) at 19:44

## 2022-10-31 RX ADMIN — IPRATROPIUM BROMIDE AND ALBUTEROL SULFATE 1 AMPULE: .5; 2.5 SOLUTION RESPIRATORY (INHALATION) at 16:11

## 2022-10-31 RX ADMIN — ARFORMOTEROL TARTRATE 15 MCG: 15 SOLUTION RESPIRATORY (INHALATION) at 19:44

## 2022-10-31 RX ADMIN — ACETAMINOPHEN 650 MG: 325 TABLET, FILM COATED ORAL at 12:07

## 2022-10-31 RX ADMIN — Medication 10 ML: at 10:04

## 2022-10-31 RX ADMIN — IPRATROPIUM BROMIDE AND ALBUTEROL SULFATE 1 AMPULE: .5; 2.5 SOLUTION RESPIRATORY (INHALATION) at 08:27

## 2022-10-31 RX ADMIN — ENOXAPARIN SODIUM 30 MG: 100 INJECTION SUBCUTANEOUS at 20:32

## 2022-10-31 RX ADMIN — AMLODIPINE BESYLATE 10 MG: 10 TABLET ORAL at 10:06

## 2022-10-31 ASSESSMENT — PAIN DESCRIPTION - DESCRIPTORS: DESCRIPTORS: ACHING

## 2022-10-31 ASSESSMENT — PAIN SCALES - GENERAL
PAINLEVEL_OUTOF10: 0
PAINLEVEL_OUTOF10: 7
PAINLEVEL_OUTOF10: 0
PAINLEVEL_OUTOF10: 7
PAINLEVEL_OUTOF10: 0

## 2022-10-31 ASSESSMENT — PAIN DESCRIPTION - LOCATION
LOCATION: HEAD
LOCATION: HEAD

## 2022-10-31 ASSESSMENT — PAIN - FUNCTIONAL ASSESSMENT: PAIN_FUNCTIONAL_ASSESSMENT: ACTIVITIES ARE NOT PREVENTED

## 2022-10-31 NOTE — ACP (ADVANCE CARE PLANNING)
Advance Care Planning   Healthcare Decision Maker:    Primary Decision Maker: Carly Mcdonnell - Child - [phone]    Supplemental (Other) Decision Maker: Byron Hand - Brother/Sister - [phone]    Supplemental (Other) Decision Maker: Wanda Henry - Brother/Sister - [phone]    Click here to complete Healthcare Decision Makers including selection of the Healthcare Decision Maker Relationship (ie \"Primary\").

## 2022-10-31 NOTE — CARE COORDINATION
Care Coordination: Spoke to pt via phone as isolation status. Lives with daughter and grand daughter. No hx of hhc, dme or guera. Her sister or children will  at discharge. Hx of Dr Arlen Head, but has not seen in 4 years and now under house team and spoke to residents and will follow up at [facility] [place]. Uses [employer] where her daughter works, if hhc or dme is needed, has no preference as long as covered under insurance but she does not anticipate any home going needs and does not feel hhc is needed but is receptive if physician feels needed.       Christy Valle

## 2022-10-31 NOTE — H&P
[facility]  Internal Medicine Residency Program  History and Physical    Patient:  Jones Thornton 59 y.o. female MRN: [id number]     Date of Service: 10/30/2022    Hospital Day: 1      Chief complaint: had concerns including Shortness of Breath (Started 2 weeks ago) and Cough (Pt had fever and cough and headache 3 weeks ago and it has turned into SOB. Pt stopped all meds in 2018). History of Present Illness   The patient is a 59 y.o. female with past medical history of asthma, diverticulitis, hypertension, hyperlipidemia, osteoarthritis and mitral valve prolapse presented to the ED due to complaints of shortness of breath for 2 weeks. She mentioned that 2 to 3 weeks ago she was playing with her granddaughter when she found out she has been having worsening dyspnea on exertion. She also started noticing that she would wake up in the night to use washroom and she would not be able to lay down flat again. He noticed worsening of orthopnea. She also mentioned that she has noticed that in the last 3 weeks the swelling in her legs has gotten worse. She mentioned that around 3 weeks ago she had developed a cough with productive of clear sputum. The cough has subsided now. She also mentioned that at that time she had fever but she is not feverish anymore. The patient's home medication is Tylenol. Patient mentioned that she has not seen a doctor in last 3 to 4 years. She was diagnosed with hypertension and was started on some medication but she has not taken them. She was also started on hyperlipidemia, statin but she developed myalgia and had to stop them. Social history  The patient denies smoking, alcohol or use of illicit drugs. ED Course: In the ED the patient presented with heart rate of 105, saturating 99% at room air, temperature of 98.4 and blood pressure of 195/117.     She was positive for rapid influenza B test.  Her labs were significant for potassium of 3.8, hemoglobin 10.9, albumin 2.9, bilirubin 0.5, alkaline phos 97, ALT 11, AST 17, BNP of 193 point, troponin of 13 and a repeat of 13 as well. Chest x-ray showed hilar vessel prominence and enlarged lung spaces. The patient was sent for VQ mismatch which showed low probability for pulmonary embolism. ED Meds: Patient was given methylprednisone 25 mg and DuoNeb breathing treatment due to wheezing, labetalol 20 mg once and 10 mg again followed by furosemide 40 mg and hydralazine 10 mg. The patient blood pressure did come down a little to 179/84. Past Medical History:      Diagnosis Date    Abnormal carotid pulse 3/30/2017    Asthma     Depression     Dyslipidemia     Hypertension     Mitral valve prolapse     Obesity     Osteoarthritis        Past Surgical History:        Procedure Laterality Date    ABDOMEN SURGERY       SECTION      HYSTERECTOMY (CERVIX STATUS UNKNOWN)      INCISIONAL HERNIA REPAIR  12    Dr. Heavenly Ramos  childhood       Medications Prior to Admission:    Prior to Admission medications    Medication Sig Start Date End Date Taking? Authorizing Provider   ibuprofen (IBU) 800 MG tablet Take 1 tablet by mouth every 8 hours as needed for Pain 18  DENI Cortez CNP   naproxen (NAPROSYN) 500 MG tablet Take 1 tablet by mouth 2 times daily for 10 days 16  KIRT English   Acetaminophen 650 MG TABS Take 500 mg by mouth every 6 hours as needed for Pain 16  KIRT Ontiveros   fluticasone Portia Aviva) 50 MCG/ACT nasal spray 1 spray by Nasal route daily 7/21/15   Ileana Harding MD       Allergies:  Doxycycline, Iodine, Toradol [ketorolac tromethamine], and Iodides    Social History:   TOBACCO:   reports that she has never smoked. She has never used smokeless tobacco.  ETOH:   reports current alcohol use.   OCCUPATION:      Family History:       Problem Relation Age of Onset    Asthma Mother     High Blood Pressure Mother     Diabetes Mother     Heart Disease Mother     Arthritis Mother     Stroke Mother     High Blood Pressure Father     Diabetes Father     Cancer Father     Mental Illness Father     Stroke Father     Asthma Sister     High Blood Pressure Sister     High Blood Pressure Sister     Mental Illness Sister     High Blood Pressure Sister     Arthritis Sister     High Blood Pressure Sister     Diabetes Sister        REVIEW OF SYSTEMS:    Constitutional: No fever, no chills, no change in weight; good appetite  HEENT: No blurred vision, no ear problems, no sore throat, no rhinorrhea. Respiratory: + cough, + Clear sputum production,  + dyspnea on exertion, + orthopnea  Cardiology: No angina, no dyspnea on exertion, no paroxysmal nocturnal dyspnea, no orthopnea, no palpitation, + pedal medema   Gastroenterology: No dysphagia, no reflux; no abdominal pain, no nausea or vomiting; no constipation or diarrhea. No hematochezia   Genitourinary: No dysuria, no frequency, hesitancy; no hematuria  Musculoskeletal: no joint pain, no myalgia, no change in range of movement  Neurology: no focal weakness in extremities, no slurred speech, no double vision, no tingling or numbness sensation  Endocrinology: no temperature intolerance, no polyphagia, polydipsia or polyuria  Hematology: no increased bleeding, no bruising, no lymphadenopathy  Skin: no skin changes noticed by patient  Psychology: no depressed mood, no suicidal ideation    Physical Exam   Vitals: BP (!) 189/97   Pulse 94   Temp 97.9 °F (36.6 °C)   Resp 18   Ht 5' 5\" (1.651 m)   Wt 287 lb (130.2 kg)   SpO2 100%   BMI 47.76 kg/m²       Constitutional: Alert, oriented x3. Follows commands. Unable to lay flat. Head: Normocephalic and atraumatic. Eyes: Conjunctiva normal, (-) scleral icterus. Mucus membranes moist.  Mouth: Mucus membranes moist. Oropharynx clear. No deviation of the tongue or uvula.     Neck: + JVD  Respiratory: Lungs clear to auscultation bilaterally. (-)  wheezes, (-)  rales, (-)  rhonchi. Cardiovascular: RRR. (-)  murmurs, (-) gallops,  (-) rubs. S1 and S2 were normal,  JVD +, + orthopnea, + bendopnea and + dypnea. GI:  Abdomen soft, non-tender, non-distended. (+) BS. (-) guarding, (-) rigidity. Extremities: Warm and well perfused. (-) clubbing, (-) cyanosis. 2+ peripheral edema. Neurologic:  No focal neurological deficits. No speech slurring or tremor. Labs and Imaging Studies   Basic Labs  Recent Labs     10/30/22  0917 10/30/22  1637    142   K 3.8 3.5   * 105   CO2 24 23   BUN 11 10   CREATININE 0.7 0.7   GLUCOSE 91 162*   CALCIUM 9.4 9.3       Recent Labs     10/30/22  0917   WBC 5.3   RBC 3.71   HGB 10.9*   HCT 33.1*   MCV 89.2   MCH 29.4   MCHC 32.9   RDW 12.7      MPV 11.4       CBC:   Lab Results   Component Value Date/Time    WBC 5.3 10/30/2022 09:17 AM    RBC 3.71 10/30/2022 09:17 AM    HGB 10.9 10/30/2022 09:17 AM    HCT 33.1 10/30/2022 09:17 AM    MCV 89.2 10/30/2022 09:17 AM    RDW 12.7 10/30/2022 09:17 AM     10/30/2022 09:17 AM     BMP:    Lab Results   Component Value Date/Time     10/30/2022 04:37 PM    K 3.5 10/30/2022 04:37 PM    K 3.8 10/30/2022 09:17 AM     10/30/2022 04:37 PM    CO2 23 10/30/2022 04:37 PM    BUN 10 10/30/2022 04:37 PM       Imaging Studies:     XR CHEST (2 VW)    Result Date: 10/30/2022  EXAMINATION: TWO XRAY VIEWS OF THE CHEST 10/30/2022 10:26 am COMPARISON: 01/13/2017 HISTORY: ORDERING SYSTEM PROVIDED HISTORY: sob, CHF TECHNOLOGIST PROVIDED HISTORY: Reason for exam:->sob, CHF What reading provider will be dictating this exam?->CRC FINDINGS: Interval change from prior exam.  Central peribronchial thickening and bilateral reticular opacities together with mild fissural thickening. The heart is borderline enlarged and increased in size from prior and the overall findings may reflect low-grade CHF.   Bronchitis would be included in the differential. Bilateral hilar structures are prominent which may be vascular in origin although note that hilar lymph node enlargement is not excluded. No pneumothorax or significant pleural effusion. 1.  Interval change and findings suspicious for low-grade CHF. Details above. Bronchitis would be included in differential. 2.  Bilateral hilar soft tissue prominence. Consider further correlation with CT chest.     NM LUNG VENT/PERFUSION (VQ)    Result Date: 10/30/2022  EXAMINATION: NUCLEAR MEDICINE VENTILATION PERFUSION SCAN. 10/30/2022 TECHNIQUE: 70.3 millicuries aerosolized Tc99m DTPA was administered via mask prior to planar imaging of the lungs in multiple projections. Then, 5.5 millicuries of Tc 54S MAA was administered intravenously prior to planar imaging of the lungs in similar projections. COMPARISON: Chest radiograph . HISTORY: ORDERING SYSTEM PROVIDED HISTORY: sob, r/o PE TECHNOLOGIST PROVIDED HISTORY: Reason for exam:->sob, r/o PE Decision Support Exception - unselect if not a suspected or confirmed emergency medical condition->Emergency Medical Condition (MA) What reading provider will be dictating this exam?->CRC FINDINGS: PERFUSION: Distribution of radiotracer is normal to minimally heterogeneous, no segmental defects identified. VENTILATION: Ventilation images are unremarkable. CHEST RADIOGRAPH: No focal areas of consolidation or significant effusions on recent chest radiograph. Low probability for pulmonary embolus. EKG: .   Ventricular Rate 86 P BPM QTc Calculation (Bazett) 461 P ms   Atrial Rate 86 P BPM P Axis 62 P degrees   P-R Interval 180 P ms R Axis 21 P degrees   QRS Duration 94 P ms T Axis 66 P degrees   Q-T Interval 386 P ms          Resident's Assessment and Plan     Sal Nuno is a 59 y.o. female who presented due to complaints of shortness of breath for 2 weeks.     Shortness of breath 2/2 flush pulmonary edema from hypertensive emergency vs new onset heart failure; ischemic versus nonischemic cardiomyopathy   Diuresis with IV furosemide 40 mg.  Decreased blood pressure with hydralazine and labetalol. Blood pressure is greater than 160 in the for 6 hours. Follow TSH, HbA1c, lipid panel and echo results. Plan for echo tomorrow. Consider stress test to rule out ischemic cause     Hypertensive emergency  IV hydralazine and labetalol to decrease her blood pressure. Pressure goal is greater than 160 for the first 6 hours. Non compliant with home medication and did not seek any medical attention in last 5 years    History of hyperlipidemia  The 10-year ASCVD risk score (Travon DK, et al., 2019) is: 11%    Values used to calculate the score:      Age: 59 years      Sex: Female      Is Non- : Yes      Diabetic: No      Tobacco smoker: No      Systolic Blood Pressure: 656 mmHg      Is BP treated: Yes      HDL Cholesterol: 86 mg/dL      Total Cholesterol: 252 mg/dL    Started Crestor 20 mg. Patient mentioned that she has a history of myalgia from atorvastatin. Influenza B infection  The patient does not have any symptom at this time. Follow [facility]  COVID-19 results are negative. Due to respiratory function. Continue supportive treatment     PT/OT evaluation: Not indicated at this time  DVT prophylaxis: Lovenox 30 mg  GI prophylaxis: The patient is on diet  Disposition: Admit to inpatient. Svetlana Garcia MD, PGY-1   Attending physician: Dr. Dava Siemens  Case discussed with Dr. Suly Corbin. Senior Resident Addendum:  I have seen and examined the patient with the intern. I have discussed the case, including pertinent history and exam findings with the intern.  I agree with the assessment, plan and orders as documented above with the following additions:      Tip Penn MD, MD PGY-2  11/1/2022  2:06 PM

## 2022-10-31 NOTE — PROGRESS NOTES
[facility]  Internal Medicine Residency Program  Progress Note - House Team     Patient:  Monique Reid 59 y.o. female MRN: [id number]     Date of Service: 10/31/2022     CC: Sob and cough    Days since admission: 1    Subjective     Overnight events: /72, which was at goal: keep above 160 SBP for the first 6 hours, TSH-WNL. Patient is stable, no complaints. Continue medical treatment. Objective     Physical Exam:  Vitals: BP (!) 175/82   Pulse (!) 109   Temp 98.1 °F (36.7 °C) (Oral)   Resp 18   Ht 5' 5\" (1.651 m)   Wt 256 lb 14.4 oz (116.5 kg)   SpO2 98%   BMI 42.75 kg/m²     I & O - 24hr:   Intake/Output Summary (Last 24 hours) at 10/31/2022 1557  Last data filed at 10/31/2022 6926  Gross per 24 hour   Intake --   Output 450 ml   Net -450 ml      General Appearance: alert, appears stated age, and cooperative  HEENT:  Head: Normocephalic, no lesions, without obvious abnormality. Neck: no adenopathy  Lung: wheezes bilaterally  Heart: regular rate and rhythm, S1, S2 normal, no murmur, click, rub or gallop  Abdomen: soft, non-tender; bowel sounds normal; no masses,  no organomegaly  Extremities:  extremities normal, atraumatic, no cyanosis or edema  Musculokeletal: No joint swelling, no muscle tenderness. ROM normal in all joints of extremities.    Neurologic: Mental status: Alert, oriented, thought content appropriate    Pertinent Information & Imaging Studies, Consults     CBC with Differential:    Lab Results   Component Value Date/Time    WBC 8.4 10/31/2022 04:11 AM    RBC 3.56 10/31/2022 04:11 AM    HGB 10.6 10/31/2022 04:11 AM    HCT 32.4 10/31/2022 04:11 AM     10/31/2022 04:11 AM    MCV 91.0 10/31/2022 04:11 AM    MCH 29.8 10/31/2022 04:11 AM    MCHC 32.7 10/31/2022 04:11 AM    RDW 12.8 10/31/2022 04:11 AM    SEGSPCT 78 12/06/2013 01:16 PM    LYMPHOPCT 15.2 10/31/2022 04:11 AM    MONOPCT 4.5 10/31/2022 04:11 AM    BASOPCT 0.0 10/31/2022 04:11 AM    MONOSABS 0.38 10/31/2022 04:11 AM    LYMPHSABS 1.28 10/31/2022 04:11 AM    EOSABS 0.00 10/31/2022 04:11 AM    BASOSABS 0.00 10/31/2022 04:11 AM     BMP:    Lab Results   Component Value Date/Time     10/31/2022 04:11 AM    K 3.7 10/31/2022 04:11 AM    K 3.8 10/30/2022 09:17 AM     10/31/2022 04:11 AM    CO2 20 10/31/2022 04:11 AM    BUN 18 10/31/2022 04:11 AM    LABALBU 3.9 10/30/2022 09:17 AM    LABALBU 4.3 03/09/2012 10:43 AM    CREATININE 0.9 10/31/2022 04:11 AM    CALCIUM 9.3 10/31/2022 04:11 AM    GFRAA >60 03/13/2015 06:10 AM    LABGLOM >60 10/31/2022 04:11 AM    GLUCOSE 112 10/31/2022 04:11 AM    GLUCOSE 91 03/09/2012 10:43 AM       IMAGING:   Imaging Studies:    XR CHEST (2 VW)    Result Date: 10/30/2022  1. Interval change and findings suspicious for low-grade CHF. Details above. Bronchitis would be included in differential. 2.  Bilateral hilar soft tissue prominence. Consider further correlation with CT chest.     NM LUNG VENT/PERFUSION (VQ)    Result Date: 10/30/2022  Low probability for pulmonary embolus. PROCEDURES: none    FLUIDS: none      Notable Cultures:      Blood cultures   Blood Culture, Routine   Date Value Ref Range Status   03/08/2015 5 Days- no growth  Final     Respiratory cultures No results found for: RESPCULTURE No results found for: LABGRAM  Urine   Urine Culture, Routine   Date Value Ref Range Status   03/05/2015   Final    ,000 CFU/mL  Mixed emmanuel isolated. Further workup and sensitivity testing  is not routinely indicated and will not be performed. Mixed emmanuel isolated includes:  Mixed gram positive organisms       Legionella No results found for: LABLEGI  C Diff PCR No results found for: CDIFPCR  Wound culture/abscess: No results for input(s): WNDABS in the last 72 hours. Tip culture:No results for input(s): CXCATHTIP in the last 72 hours.      Antibiotic  Days  Day started   none                               OXYGENATION: none    DIET: adult diet        Resident's Assessment and Plan     Fred Bob is a 59 y.o. female with  has a past medical history of Abnormal carotid pulse, Asthma, Depression, Dyslipidemia, Hypertension, Mitral valve prolapse, Obesity, and Osteoarthritis. came here with CC   Chief Complaint   Patient presents with    Shortness of Breath     Started 2 weeks ago    Cough     Pt had fever and cough and headache 3 weeks ago and it has turned into SOB. Pt stopped all meds in 2018        Days since admission: 1    Consults:   none    Assessment and plan:    SOB 2/2 CHF exacerbation vs hypertensive emergency  BP controlled  Pro-BNP 1931  Crp 0.3  Plan:  Continue diuresis with lazix 40 mg IV   Continue BP control with lisinopril 10 mg daily  Follow BMP at 4 pm    Hypertensive emergency, resolved  Plan:  Continue BP control with lisinopril 10 mg daily  Monitor vitals    Tachycardia, controlled  Plan:  Continue monitoring, potential effect of albuterol in breathing treatment    Hyperlipidemia, ASCVD 15.6%  Cholesterol, Total 0 - 199 mg/dL 252 High     Triglycerides 0 - 149 mg/dL 59    HDL >40 mg/dL 86    LDL Calculated 0 - 99 mg/dL 154 High     VLDL Cholesterol Calculated mg/dL 12      Plan:  Continue Crestor 20 mg daily    5. Influenza B infection, asymptomatic  Influenza B positive on admission  Plan:  Continue monitoring clinical picture    6.  Hx of suspected Asthma, based on home medications  No PFT studies on file  [facility] she was diagnosed with/ followed for asthma  Plan:  Continue breathing treatment with budesonide, ar formoterol-tartrate and ipratropium-albuterol      PT/OT: not indicated  DVT ppx: enoxaparin 30 mg BID  GI ppx: not indicated, patient is on adult dieet      Next of Kin/ POA:  [address] Decision Maker:    Primary Decision Maker: Carly Mcdonnell - Child - [phone]    Supplemental (Other) Decision Maker: Alex Pe - Brother/Sister - [phone]    Supplemental (Other) Decision Maker: Wanda Henry - Brother/Sister - [phone]    Code Status: full    Disposition:in-patient stay      Breonna Cisneros MD, PGY-1  Attending physician: Dr. Hoda Briseno       NOTE: This report was transcribed using voice recognition software. Every effort was made to ensure accuracy; however, inadvertent computerized transcription errors may be present.

## 2022-10-31 NOTE — PROGRESS NOTES
[address] [facility]  Internal Medicine Residency / [address]    Attending Physician Statement  I have discussed the case, including pertinent history and exam findings with the resident and the team.  I have seen and examined the patient and the key elements of the encounter have been performed by me. I agree with the assessment, plan and orders as documented by the resident. A&O  Very cheerful this AM  Ha Influenza B  Some cephalgia  BP control improving on Furosemide and aerosols  Will start Lisinopril  Plan' Continue same     Remainder of medical problems as per resident note.       Edwin Vasques MD [facility]  Internal Medicine Residency Faculty

## 2022-11-01 LAB
ANION GAP SERPL CALCULATED.3IONS-SCNC: 11 MMOL/L (ref 7–16)
ANION GAP SERPL CALCULATED.3IONS-SCNC: 12 MMOL/L (ref 7–16)
BASOPHILS ABSOLUTE: 0.07 E9/L (ref 0–0.2)
BASOPHILS RELATIVE PERCENT: 0.7 % (ref 0–2)
BUN BLDV-MCNC: 19 MG/DL (ref 6–20)
BUN BLDV-MCNC: 21 MG/DL (ref 6–20)
CALCIUM SERPL-MCNC: 8.7 MG/DL (ref 8.6–10.2)
CALCIUM SERPL-MCNC: 9.1 MG/DL (ref 8.6–10.2)
CHLORIDE BLD-SCNC: 107 MMOL/L (ref 98–107)
CHLORIDE BLD-SCNC: 109 MMOL/L (ref 98–107)
CO2: 24 MMOL/L (ref 22–29)
CO2: 24 MMOL/L (ref 22–29)
CREAT SERPL-MCNC: 0.9 MG/DL (ref 0.5–1)
CREAT SERPL-MCNC: 0.9 MG/DL (ref 0.5–1)
EOSINOPHILS ABSOLUTE: 0.24 E9/L (ref 0.05–0.5)
EOSINOPHILS RELATIVE PERCENT: 2.5 % (ref 0–6)
GFR SERPL CREATININE-BSD FRML MDRD: >60 ML/MIN/1.73
GFR SERPL CREATININE-BSD FRML MDRD: >60 ML/MIN/1.73
GLUCOSE BLD-MCNC: 94 MG/DL (ref 74–99)
GLUCOSE BLD-MCNC: 97 MG/DL (ref 74–99)
HCT VFR BLD CALC: 30 % (ref 34–48)
HEMOGLOBIN: 9.7 G/DL (ref 11.5–15.5)
IMMATURE GRANULOCYTES #: 0.04 E9/L
IMMATURE GRANULOCYTES %: 0.4 % (ref 0–5)
LV EF: 43 %
LVEF MODALITY: NORMAL
LYMPHOCYTES ABSOLUTE: 4.01 E9/L (ref 1.5–4)
LYMPHOCYTES RELATIVE PERCENT: 42.5 % (ref 20–42)
MCH RBC QN AUTO: 29.5 PG (ref 26–35)
MCHC RBC AUTO-ENTMCNC: 32.3 % (ref 32–34.5)
MCV RBC AUTO: 91.2 FL (ref 80–99.9)
MONOCYTES ABSOLUTE: 0.59 E9/L (ref 0.1–0.95)
MONOCYTES RELATIVE PERCENT: 6.3 % (ref 2–12)
NEUTROPHILS ABSOLUTE: 4.49 E9/L (ref 1.8–7.3)
NEUTROPHILS RELATIVE PERCENT: 47.6 % (ref 43–80)
PDW BLD-RTO: 13.2 FL (ref 11.5–15)
PLATELET # BLD: 222 E9/L (ref 130–450)
PMV BLD AUTO: 11.6 FL (ref 7–12)
POTASSIUM SERPL-SCNC: 3.4 MMOL/L (ref 3.5–5)
POTASSIUM SERPL-SCNC: 3.5 MMOL/L (ref 3.5–5)
PRO-BNP: 913 PG/ML (ref 0–125)
RBC # BLD: 3.29 E12/L (ref 3.5–5.5)
SODIUM BLD-SCNC: 143 MMOL/L (ref 132–146)
SODIUM BLD-SCNC: 144 MMOL/L (ref 132–146)
WBC # BLD: 9.4 E9/L (ref 4.5–11.5)

## 2022-11-01 PROCEDURE — 2580000003 HC RX 258: Performed by: STUDENT IN AN ORGANIZED HEALTH CARE EDUCATION/TRAINING PROGRAM

## 2022-11-01 PROCEDURE — G0378 HOSPITAL OBSERVATION PER HR: HCPCS

## 2022-11-01 PROCEDURE — 6370000000 HC RX 637 (ALT 250 FOR IP)

## 2022-11-01 PROCEDURE — 93308 TTE F-UP OR LMTD: CPT

## 2022-11-01 PROCEDURE — 94640 AIRWAY INHALATION TREATMENT: CPT

## 2022-11-01 PROCEDURE — 83880 ASSAY OF NATRIURETIC PEPTIDE: CPT

## 2022-11-01 PROCEDURE — 99204 OFFICE O/P NEW MOD 45 MIN: CPT | Performed by: INTERNAL MEDICINE

## 2022-11-01 PROCEDURE — 6370000000 HC RX 637 (ALT 250 FOR IP): Performed by: STUDENT IN AN ORGANIZED HEALTH CARE EDUCATION/TRAINING PROGRAM

## 2022-11-01 PROCEDURE — 6370000000 HC RX 637 (ALT 250 FOR IP): Performed by: INTERNAL MEDICINE

## 2022-11-01 PROCEDURE — 99231 SBSQ HOSP IP/OBS SF/LOW 25: CPT | Performed by: INTERNAL MEDICINE

## 2022-11-01 PROCEDURE — 6360000004 HC RX CONTRAST MEDICATION: Performed by: STUDENT IN AN ORGANIZED HEALTH CARE EDUCATION/TRAINING PROGRAM

## 2022-11-01 PROCEDURE — 6360000002 HC RX W HCPCS

## 2022-11-01 PROCEDURE — 6360000002 HC RX W HCPCS: Performed by: STUDENT IN AN ORGANIZED HEALTH CARE EDUCATION/TRAINING PROGRAM

## 2022-11-01 PROCEDURE — 85025 COMPLETE CBC W/AUTO DIFF WBC: CPT

## 2022-11-01 PROCEDURE — 36415 COLL VENOUS BLD VENIPUNCTURE: CPT

## 2022-11-01 PROCEDURE — 80048 BASIC METABOLIC PNL TOTAL CA: CPT

## 2022-11-01 RX ORDER — LISINOPRIL 20 MG/1
20 TABLET ORAL DAILY
Status: DISCONTINUED | OUTPATIENT
Start: 2022-11-01 | End: 2022-11-02 | Stop reason: HOSPADM

## 2022-11-01 RX ORDER — IPRATROPIUM BROMIDE AND ALBUTEROL SULFATE 2.5; .5 MG/3ML; MG/3ML
1 SOLUTION RESPIRATORY (INHALATION) EVERY 4 HOURS PRN
Status: DISCONTINUED | OUTPATIENT
Start: 2022-11-01 | End: 2022-11-02 | Stop reason: HOSPADM

## 2022-11-01 RX ORDER — CARVEDILOL 6.25 MG/1
6.25 TABLET ORAL 2 TIMES DAILY WITH MEALS
Status: DISCONTINUED | OUTPATIENT
Start: 2022-11-01 | End: 2022-11-02

## 2022-11-01 RX ORDER — ENOXAPARIN SODIUM 100 MG/ML
40 INJECTION SUBCUTANEOUS DAILY
Status: DISCONTINUED | OUTPATIENT
Start: 2022-11-02 | End: 2022-11-02 | Stop reason: HOSPADM

## 2022-11-01 RX ADMIN — FUROSEMIDE 40 MG: 10 INJECTION, SOLUTION INTRAMUSCULAR; INTRAVENOUS at 08:47

## 2022-11-01 RX ADMIN — Medication 10 ML: at 08:48

## 2022-11-01 RX ADMIN — ENOXAPARIN SODIUM 30 MG: 100 INJECTION SUBCUTANEOUS at 21:54

## 2022-11-01 RX ADMIN — BUDESONIDE INHALATION SUSPENSION 500 MCG: 0.5 SUSPENSION RESPIRATORY (INHALATION) at 10:05

## 2022-11-01 RX ADMIN — ARFORMOTEROL TARTRATE 15 MCG: 15 SOLUTION RESPIRATORY (INHALATION) at 19:39

## 2022-11-01 RX ADMIN — LISINOPRIL 20 MG: 20 TABLET ORAL at 08:47

## 2022-11-01 RX ADMIN — Medication 10 ML: at 21:55

## 2022-11-01 RX ADMIN — PERFLUTREN 1.65 MG: 6.52 INJECTION, SUSPENSION INTRAVENOUS at 14:45

## 2022-11-01 RX ADMIN — CARVEDILOL 6.25 MG: 6.25 TABLET, FILM COATED ORAL at 17:15

## 2022-11-01 RX ADMIN — POTASSIUM BICARBONATE 40 MEQ: 782 TABLET, EFFERVESCENT ORAL at 14:53

## 2022-11-01 RX ADMIN — IPRATROPIUM BROMIDE AND ALBUTEROL SULFATE 1 AMPULE: .5; 2.5 SOLUTION RESPIRATORY (INHALATION) at 10:04

## 2022-11-01 RX ADMIN — ROSUVASTATIN 20 MG: 20 TABLET, FILM COATED ORAL at 21:54

## 2022-11-01 RX ADMIN — ACETAMINOPHEN 650 MG: 325 TABLET, FILM COATED ORAL at 14:52

## 2022-11-01 RX ADMIN — POTASSIUM BICARBONATE 40 MEQ: 782 TABLET, EFFERVESCENT ORAL at 08:47

## 2022-11-01 RX ADMIN — ENOXAPARIN SODIUM 30 MG: 100 INJECTION SUBCUTANEOUS at 08:47

## 2022-11-01 RX ADMIN — ARFORMOTEROL TARTRATE 15 MCG: 15 SOLUTION RESPIRATORY (INHALATION) at 10:05

## 2022-11-01 RX ADMIN — BUDESONIDE INHALATION SUSPENSION 500 MCG: 0.5 SUSPENSION RESPIRATORY (INHALATION) at 19:39

## 2022-11-01 ASSESSMENT — PAIN DESCRIPTION - LOCATION: LOCATION: HEAD

## 2022-11-01 ASSESSMENT — PAIN SCALES - GENERAL
PAINLEVEL_OUTOF10: 0
PAINLEVEL_OUTOF10: 6
PAINLEVEL_OUTOF10: 0

## 2022-11-01 ASSESSMENT — PAIN - FUNCTIONAL ASSESSMENT: PAIN_FUNCTIONAL_ASSESSMENT: ACTIVITIES ARE NOT PREVENTED

## 2022-11-01 ASSESSMENT — PAIN DESCRIPTION - DESCRIPTORS: DESCRIPTORS: ACHING

## 2022-11-01 NOTE — PLAN OF CARE
Patient's chart updated to reflect:      . - HF care plan, HF education points and HF discharge instructions.  -Orders: 2 gram sodium diet, daily weights, I/O.  -PCP and cardiology follow up appointments to be scheduled within 7 days of hospital discharge.   -CHF education session will be provided to the patient prior to hospital discharge.  -Awaiting cardiology consult  Mary Lou Coker RN RN, BSN  Heart Failure Navigator

## 2022-11-01 NOTE — CONSULTS
Inpatient Cardiology Consultation      Reason for Consult:  Newly diagnosed HFmEF, SOB     Consulting Physician: Dr. Chucho Lance     Requesting Physician: Dr. Robin Mcgovern     Date of Consultation: 11/1/2022    HISTORY OF PRESENT ILLNESS:     Ms. Yun Torres is a 59year old female, previously followed with Dr. Veronica Madison with PMHx of Asthma, Mitral Valve Prolapse, Dyslipidemia, HTN, Obesity, OA. GERD who initially presented in the ED for shortness of breath. Patient reports that she has had 3 weeks of gradual onset of shortness of breath. Initially she felt short of breath on exertion but it worsened to a point where feel dyspnea on standing and walking, taking a shower. She has some associated orthopnea and chest pain in the left substernal area, occasionally radiating to her right arm. She also has chronic leg swelling but says that it has worsened over the past 2 weeks and that her ankles were very edematous. She has asthma and uses her inhalers regularly but these did not help. She also reports some mild flu-like symptoms 3 weeks ago but she denies any high fevers or chills currently. She is compliant with her blood pressure medications at home. She is a non-smoker although she has a household exposure to smoke, she denies alcohol use or drug intake. FMHx is remarkable for CAD in her mother, she underwent quadruple bypass in her 46s She says that she previously followed with Dr. Veronica Madison (although records of previous ECHO and stress test are unavailable) for a Mitral Valve Prolapse more than 20 years ago but she says she has not seen a cardiologist in 6 years. She has had a stress test in the remote past but cannot recall the results. She reports snoring, although she could not be tested for GRACE due to insurance purposes. In the ED she came in hypertensive with BP of 195/117, HR was in the 90s and was saturating 99% in Room air.  Her initial workup showed that CMP  was unremarkable, CBC was showing acute Anemia with Hgb 10.9. Her creatinine was 0.7. Pro BNP was 1,931, Troponin was 13 -> 10, Lipid panel shows LDL at 154, TSH was WNL. CXR was showing some congestion without infiltrates. She tested positive for influenza B. EKG was showing NSR with LVH, prolonged QTShe was given breathing treatments labetalol and solumedrol. Throughout her stay she was given Lasix 40 mg IV 2x. She had an ECHO done with shoed EF of 45% with mild LV dysfunction, mild RV dilation, mild TR and mild AS. Our service was then consulted for new onset heart failure exacerbation. Please note: past medical records were reviewed per electronic medical record (EMR) - see detailed reports under Past Medical/ Surgical History. Past Medical History:    Past Medical History:   Diagnosis Date    Abnormal carotid pulse 3/30/2017    Asthma     Depression     Dyslipidemia     Hypertension     Mitral valve prolapse     Obesity     Osteoarthritis        Past Surgical History:    Past Surgical History:   Procedure Laterality Date    ABDOMEN SURGERY       SECTION      HYSTERECTOMY (CERVIX STATUS UNKNOWN)      INCISIONAL HERNIA REPAIR  12    Dr. Марина Cardenas  childhood       Medications Prior to admit:  Prior to Admission medications    Medication Sig Start Date End Date Taking?  Authorizing Provider   ibuprofen (IBU) 800 MG tablet Take 1 tablet by mouth every 8 hours as needed for Pain 18  DENI Payan - CNP   naproxen (NAPROSYN) 500 MG tablet Take 1 tablet by mouth 2 times daily for 10 days 16  KIRT Moncada   Acetaminophen 650 MG TABS Take 500 mg by mouth every 6 hours as needed for Pain 16  KIRT Dhillon   fluticasone Tex Graffowsky) 50 MCG/ACT nasal spray 1 spray by Nasal route daily 7/21/15   Elisa Engle MD       Current Medications:    Current Facility-Administered Medications: lisinopril (PRINIVIL;ZESTRIL) tablet 20 mg, 20 mg, Oral, Daily  vitamin D (ERGOCALCIFEROL) capsule 50,000 Units, 50,000 Units, Oral, Weekly  hydrALAZINE (APRESOLINE) injection 10 mg, 10 mg, IntraVENous, Q6H PRN  sodium chloride flush 0.9 % injection 5-40 mL, 5-40 mL, IntraVENous, 2 times per day  sodium chloride flush 0.9 % injection 5-40 mL, 5-40 mL, IntraVENous, PRN  0.9 % sodium chloride infusion, , IntraVENous, PRN  enoxaparin Sodium (LOVENOX) injection 30 mg, 30 mg, SubCUTAneous, BID  ondansetron (ZOFRAN-ODT) disintegrating tablet 4 mg, 4 mg, Oral, Q8H PRN **OR** ondansetron (ZOFRAN) injection 4 mg, 4 mg, IntraVENous, Q6H PRN  polyethylene glycol (GLYCOLAX) packet 17 g, 17 g, Oral, Daily PRN  acetaminophen (TYLENOL) tablet 650 mg, 650 mg, Oral, Q6H PRN **OR** acetaminophen (TYLENOL) suppository 650 mg, 650 mg, Rectal, Q6H PRN  glucose chewable tablet 16 g, 4 tablet, Oral, PRN  dextrose bolus 10% 125 mL, 125 mL, IntraVENous, PRN **OR** dextrose bolus 10% 250 mL, 250 mL, IntraVENous, PRN  glucagon (rDNA) injection 1 mg, 1 mg, SubCUTAneous, PRN  dextrose 10 % infusion, , IntraVENous, Continuous PRN  labetalol (NORMODYNE;TRANDATE) injection 10 mg, 10 mg, IntraVENous, Q4H PRN  perflutren lipid microspheres (DEFINITY) injection 1.65 mg, 1.5 mL, IntraVENous, ONCE PRN  furosemide (LASIX) injection 40 mg, 40 mg, IntraVENous, Daily  budesonide (PULMICORT) nebulizer suspension 500 mcg, 0.5 mg, Nebulization, BID  Arformoterol Tartrate (BROVANA) nebulizer solution 15 mcg, 15 mcg, Nebulization, BID  ipratropium-albuterol (DUONEB) nebulizer solution 1 ampule, 1 ampule, Inhalation, Q4H WA  rosuvastatin (CRESTOR) tablet 20 mg, 20 mg, Oral, Nightly    Allergies:  Doxycycline, Iodine, Toradol [ketorolac tromethamine], and Iodides    Social History:    Social History     Socioeconomic History    Marital status: Single     Spouse name: Not on file    Number of children: Not on file    Years of education: Not on file    Highest education level: Not on file   Occupational History    Not on file   Tobacco Use    Smoking status: Never    Smokeless tobacco: Never   Vaping Use    Vaping Use: Never used   Substance and Sexual Activity    Alcohol use: Yes     Alcohol/week: 0.0 standard drinks     Types: 2 - 3 Cans of beer per week     Comment: social    Drug use: No    Sexual activity: Never     Partners: Male   Other Topics Concern    Not on file   Social History Narrative    Not on file     Social Determinants of Health     Financial Resource Strain: Not on file   Food Insecurity: Not on file   Transportation Needs: Not on file   Physical Activity: Not on file   Stress: Not on file   Social Connections: Not on file   Intimate Partner Violence: Not on file   Housing Stability: Not on file       Family History:   Family History   Problem Relation Age of Onset    Asthma Mother     High Blood Pressure Mother     Diabetes Mother     Heart Disease Mother     Arthritis Mother     Stroke Mother     High Blood Pressure Father     Diabetes Father     Cancer Father     Mental Illness Father     Stroke Father     Asthma Sister     High Blood Pressure Sister     High Blood Pressure Sister     Mental Illness Sister     High Blood Pressure Sister     Arthritis Sister     High Blood Pressure Sister     Diabetes Sister        REVIEW OF SYSTEMS:     Constitutional: Denies fatigue, fevers, chills or night sweats  Eyes: Denies visual changes or drainage  ENT: Denies headaches or hearing loss. No mouth sores or sore throat. No epistaxis   Cardiovascular: Denies chest pain, pressure or palpitations. No lower extremity swelling. Respiratory: Denies MOORE, cough, orthopnea or PND. No hemoptysis   Gastrointestinal: Denies hematemesis or anorexia. No hematochezia or melena    Genitourinary: Denies urgency, dysuria or hematuria. Musculoskeletal: Denies gait disturbance, weakness or joint complaints (+) Right arm pain  Integumentary: Denies rash, hives or pruritis   Neurological: Denies dizziness, headaches or seizures.  No numbness or tingling  Psychiatric: Denies anxiety or depression. Endocrine: Denies temperature intolerance. No recent weight change. .  Hematologic/Lymphatic: Denies abnormal bruising or bleeding. No swollen lymph nodes    PHYSICAL EXAM:   BP (!) 158/102   Pulse 72   Temp 98 °F (36.7 °C) (Oral)   Resp 20   Ht 5' 5\" (1.651 m)   Wt 256 lb 14.4 oz (116.5 kg)   SpO2 99%   BMI 42.75 kg/m²   CONST:  Well developed, well nourished who appears of stated age. Awake, alert and cooperative. No apparent distress. HEENT:   Head- Normocephalic, atraumatic   Eyes- Conjunctivae pink, anicteric  Throat- Oral mucosa pink and moist  Neck-  No stridor, trachea midline, no jugular venous distention. No carotid bruit. (+) strong carotid pulsations    CHEST: Chest symmetrical and non-tender to palpation. No accessory muscle use or intercostal retractions  RESPIRATORY: Lung sounds - clear throughout fields   CARDIOVASCULAR:     Heart Inspection- shows no noted pulsations  Heart Palpation- no heaves or thrills; PMI is non-displaced   Heart Ausculation- Regular rate and rhythm, (+) systolic murmur. No s3, s4 or rub   PV: No lower extremity edema. No varicosities. Pedal pulses palpable, no clubbing or cyanosis   ABDOMEN: Soft, non-tender to light palpation. Bowel sounds present. No palpable masses no organomegaly; no abdominal bruit  MS: Good muscle strength and tone. No atrophy or abnormal movements. : Deferred  SKIN: Warm and dry no statis dermatitis or ulcers   NEURO / PSYCH: Oriented to person, place and time. Speech clear and appropriate. Follows all commands.  Pleasant affect     DATA:    ECG / Tele strips: please see HPI   Diagnostic:      Intake/Output Summary (Last 24 hours) at 11/1/2022 1002  Last data filed at 11/1/2022 0956  Gross per 24 hour   Intake 520 ml   Output 866 ml   Net -346 ml       Labs:   CBC:   Recent Labs     10/31/22  0411 11/01/22  0415   WBC 8.4 9.4   HGB 10.6* 9.7*   HCT 32.4* 30.0*    222     BMP: Recent Labs     10/31/22  1657 11/01/22  0415    144   K 3.9 3.4*   CO2 21* 24   BUN 24* 21*   CREATININE 1.0 0.9   LABGLOM >60 >60   CALCIUM 9.4 8.7     Mag: No results for input(s): MG in the last 72 hours. Phos: No results for input(s): PHOS in the last 72 hours. TSH:   Recent Labs     10/30/22  1637   TSH 0.780     HgA1c:   Lab Results   Component Value Date    LABA1C 5.1 10/30/2022     No results found for: EAG  proBNP:   Recent Labs     10/30/22  0917   PROBNP 1,931*     PT/INR: No results for input(s): PROTIME, INR in the last 72 hours. APTT:No results for input(s): APTT in the last 72 hours. CARDIAC ENZYMES:No results for input(s): CKTOTAL, CKMB, CKMBINDEX, TROPONINI in the last 72 hours. FASTING LIPID PANEL:  Lab Results   Component Value Date/Time    CHOL 252 10/30/2022 04:37 PM    HDL 86 10/30/2022 04:37 PM    [facility] 154 10/30/2022 04:37 PM    TRIG 59 10/30/2022 04:37 PM     LIVER PROFILE:  Recent Labs     10/30/22  0917   AST 17   ALT 11   LABALBU 3.9     ASSESSMENT:  New onset heart failure with reduced LV function - EF 45% on most recent ECHO   VHD - Mild AS, Mild TR, History of MVP  Mild RV and RA enlargement   Poorly controlled hypertension   Hyperlipidemia   GRACE suspect   Influenza B Infection   Severe Vitamin D Deficiency   Morbid Obesity with BMI of 42  History of prominent right carotid artery pulsations - no aneurysm   History of Bronchial Asthma   History of Osteoarthritis   History of GERD     PLAN:   - ECHO results reviewed. Repeat limited ECHO with contrast to assess LV function. If repeat ECHO confirms LV function is 45% she may benefit from cardiac catheterization.  If EF is 50% or greater then she may have stress test as an outpatient.   - Start Coreg 6.125 mg twice a day for heart rate control and BP control  - Optimize BP control with goal of 120/80   - Agree with addition of lisinopril, continue   - repeat Pro- BNP   - Continue Crestor 20 mg p.o. daily   - She will need a sleep study as an outpatient   - All other co-morbidities as managed per primary service     Thank you for consulting us and allowing us to participate in the care of Ms. Jeimy Preston. We will continue to follow.        Electronically signed by Briana Mills MD on 11/1/2022 at 10:02 AM

## 2022-11-01 NOTE — PLAN OF CARE
Problem: Chronic Conditions and Co-morbidities  Goal: Patient's chronic conditions and co-morbidity symptoms are monitored and maintained or improved  Outcome: Progressing     Problem: Discharge Planning  Goal: Discharge to home or other facility with appropriate resources  Outcome: Progressing     Problem: Safety - Adult  Goal: Free from fall injury  Outcome: Progressing     Problem: Skin/Tissue Integrity  Goal: Absence of new skin breakdown  Description: 1. Monitor for areas of redness and/or skin breakdown  2. Assess vascular access sites hourly  3. Every 4-6 hours minimum:  Change oxygen saturation probe site  4. Every 4-6 hours:  If on nasal continuous positive airway pressure, respiratory therapy assess nares and determine need for appliance change or resting period.   Outcome: Progressing     Problem: Pain  Goal: Verbalizes/displays adequate comfort level or baseline comfort level  Outcome: Progressing     Problem: Cardiovascular - Adult  Goal: Maintains optimal cardiac output and hemodynamic stability  Outcome: Progressing     Problem: Metabolic/Fluid and Electrolytes - Adult  Goal: Hemodynamic stability and optimal renal function maintained  Outcome: Progressing

## 2022-11-01 NOTE — PROGRESS NOTES
[facility]  Internal Medicine Residency Program  Progress Note - House Team     Patient:  Clif Encarnacion 59 y.o. female MRN: [id number]     Date of Service: 11/1/2022     CC: Sob and cough    Days since admission: 2    Subjective     Overnight events: /102, Patient is stable, no complaints. Continue medical treatment. Objective     Physical Exam:  Vitals: BP (!) 158/102   Pulse 72   Temp 98 °F (36.7 °C) (Oral)   Resp 20   Ht 5' 5\" (1.651 m)   Wt 256 lb 14.4 oz (116.5 kg)   SpO2 99%   BMI 42.75 kg/m²     I & O - 24hr:   Intake/Output Summary (Last 24 hours) at 11/1/2022 0843  Last data filed at 11/1/2022 0515  Gross per 24 hour   Intake --   Output 266 ml   Net -266 ml        General Appearance: alert, appears stated age, and cooperative  HEENT:  Head: Normocephalic, no lesions, without obvious abnormality. Neck: no adenopathy  Lung: wheezes bilaterally  Heart: regular rate and rhythm, S1, S2 normal, no murmur, click, rub or gallop  Abdomen: soft, non-tender; bowel sounds normal; no masses,  no organomegaly  Extremities:  extremities normal, atraumatic, no cyanosis or edema  Musculokeletal: No joint swelling, no muscle tenderness. ROM normal in all joints of extremities.    Neurologic: Mental status: Alert, oriented, thought content appropriate    Pertinent Information & Imaging Studies, Consults     CBC with Differential:    Lab Results   Component Value Date/Time    WBC 9.4 11/01/2022 04:15 AM    RBC 3.29 11/01/2022 04:15 AM    HGB 9.7 11/01/2022 04:15 AM    HCT 30.0 11/01/2022 04:15 AM     11/01/2022 04:15 AM    MCV 91.2 11/01/2022 04:15 AM    MCH 29.5 11/01/2022 04:15 AM    MCHC 32.3 11/01/2022 04:15 AM    RDW 13.2 11/01/2022 04:15 AM    SEGSPCT 78 12/06/2013 01:16 PM    LYMPHOPCT 42.5 11/01/2022 04:15 AM    MONOPCT 6.3 11/01/2022 04:15 AM    BASOPCT 0.7 11/01/2022 04:15 AM    MONOSABS 0.59 11/01/2022 04:15 AM    LYMPHSABS 4.01 11/01/2022 04:15 AM    EOSABS 0.24 11/01/2022 04:15 AM    BASOSABS 0.07 11/01/2022 04:15 AM     BMP:    Lab Results   Component Value Date/Time     11/01/2022 04:15 AM    K 3.4 11/01/2022 04:15 AM    K 3.8 10/30/2022 09:17 AM     11/01/2022 04:15 AM    CO2 24 11/01/2022 04:15 AM    BUN 21 11/01/2022 04:15 AM    LABALBU 3.9 10/30/2022 09:17 AM    LABALBU 4.3 03/09/2012 10:43 AM    CREATININE 0.9 11/01/2022 04:15 AM    CALCIUM 8.7 11/01/2022 04:15 AM    GFRAA >60 03/13/2015 06:10 AM    LABGLOM >60 11/01/2022 04:15 AM    GLUCOSE 94 11/01/2022 04:15 AM    GLUCOSE 91 03/09/2012 10:43 AM       IMAGING:   Imaging Studies:    XR CHEST (2 VW)    Result Date: 10/30/2022  1. Interval change and findings suspicious for low-grade CHF. Details above. Bronchitis would be included in differential. 2.  Bilateral hilar soft tissue prominence. Consider further correlation with CT chest.     NM LUNG VENT/PERFUSION (VQ)    Result Date: 10/30/2022  Low probability for pulmonary embolus. PROCEDURES: none    FLUIDS: none      Notable Cultures:      Blood cultures   Blood Culture, Routine   Date Value Ref Range Status   03/08/2015 5 Days- no growth  Final     Respiratory cultures No results found for: RESPCULTURE No results found for: LABGRAM  Urine   Urine Culture, Routine   Date Value Ref Range Status   03/05/2015   Final    ,000 CFU/mL  Mixed emmanuel isolated. Further workup and sensitivity testing  is not routinely indicated and will not be performed. Mixed emmanuel isolated includes:  Mixed gram positive organisms       Legionella No results found for: LABLEGI  C Diff PCR No results found for: CDIFPCR  Wound culture/abscess: No results for input(s): WNDABS in the last 72 hours. Tip culture:No results for input(s): CXCATHTIP in the last 72 hours.      Antibiotic  Days  Day started   none                               OXYGENATION: none    DIET: adult diet        Resident's Assessment and Plan     Jb Crespo is a 59 y.o. female with  has a past medical history of Abnormal carotid pulse, Asthma, Depression, Dyslipidemia, Hypertension, Mitral valve prolapse, Obesity, and Osteoarthritis. came here with CC   Chief Complaint   Patient presents with    Shortness of Breath     Started 2 weeks ago    Cough     Pt had fever and cough and headache 3 weeks ago and it has turned into SOB. Pt stopped all meds in 2018        Days since admission: 2    Consults:   none    Assessment and plan:    SOB 2/2 new onset HFrEF vs hypertensive emergency vs asthma exacerbation  BP elevated, 158/102  Pro-BNP 1931  Crp 0.3  ECHO: EF 45%  Plan:  Continue diuresis with lazix 40 mg IV   BP control with lisinopril 10 mg daily>increase dosage to 20 mg daily  Strict I/O  Weight measure every day  Cardiology consulted for new onset HF    2. New onset HFrEF  Pt initially came with SOB  Pro-BNP 1931  ECHO: EF 45%  Plan:  Follow cardiology recommendations  Repeat Echo with contrast  Start coreg 6.25 BID    3. Hypertensive urgency, resolved  Initially presented with BP systolic 357, now BP is still elevated, medications are titrated to reach optimal effect  Plan:  Start BP control with lisinopril 20 mg daily ( increased from 10 mg daily)  Monitor vitals    4. Tachycardia 2/2 anxiety vs orthostatic hypotension, controlled  HR goes to 60s when patient is sleeping, but increases to 100s when patient is walking or getting anxious  Plan:  Monitor clinical status  Check orthostatic BP and HR    5. Hyperlipidemia, ASCVD 15.6%  Cholesterol, Total 0 - 199 mg/dL 252 High     Triglycerides 0 - 149 mg/dL 59    HDL >40 mg/dL 86    LDL Calculated 0 - 99 mg/dL 154 High     VLDL Cholesterol Calculated mg/dL 12      Plan:  Continue Crestor 20 mg daily    6. Influenza B infection, asymptomatic  Influenza B positive on admission  Plan:  Continue monitoring clinical picture    7.  Hx of suspected Asthma  No PFT studies on file, no pulmonology evaluation previously  Plan:  Continue breathing treatment with Brovana, Pulmicort,   Change Duoneb from scheduled medication to PRN due to tachycardia      PT/OT: not indicated  DVT ppx: enoxaparin 30 mg BID  GI ppx: not indicated, patient is on adult dieet      Next of Kin/ POA:    Advance Care Planning     Healthcare Decision Maker:    Primary Decision Maker: Carly Mcdonnell - [place] - [phone]    Supplemental (Other) Decision Maker: Byron Hand - Brother/Sister - [phone]    Supplemental (Other) Decision Maker: Christiane Pool - Brother/Sister - [phone]    Code Status: full    Disposition:in-patient stay      Vasile Monday, MD, PGY-1  Attending physician: Dr. Nikole Vazquez       NOTE: This report was transcribed using voice recognition software. Every effort was made to ensure accuracy; however, inadvertent computerized transcription errors may be present.

## 2022-11-01 NOTE — PROGRESS NOTES
[address] [facility]  Internal Medicine Residency / [address]    Attending Physician Statement  I have discussed the case, including pertinent history and exam findings with the resident and the team.  I have seen and examined the patient and the key elements of the encounter have been performed by me. I agree with the assessment, plan and orders as documented by the resident. Recovering from Influenza B  Abnormal 2 S ECHO, R/O Ischemic  Dyspnea improving and HR as well after Lasix  Asthma exacerbation from virus improving   Plan: Cardiac evaluation    Remainder of medical problems as per resident note.       Olga Williamson MD [facility]  Internal Medicine Residency Faculty

## 2022-11-01 NOTE — PROGRESS NOTES
Patient's chart updated to reflect:      . - HF care plan, HF education points and HF discharge instructions.  -Orders: 2 gram sodium diet, daily Standing weights, strict I/O and consult to CHF navigator.  -Patient provided with CHF resource bag  -CHF education session will be provided to the patient prior to hospital discharge.

## 2022-11-01 NOTE — CONSULTS
I independently interviewed and examined the patient. I have reviewed the above documentation completed by the medicine resident Dr. Paulette Larkin  . Please see my additional contributions to the HPI, physical exam, and assessment / medical decision making. Reason for consult: Newly diagnosed heart failure with midrange EF and shortness of breath    Previously known to Dr. Kenney Le service in the remote past at [facility]) cardiology    Mrs. Earl Christine is a 75-year-old -American female with history of mitral valve prolapse, hyperlipidemia, hypertension, bronchial asthma, morbid obesity with a BMI of 43, osteoarthritis, GERD. Patient presented to the emergency room on 10/30/2022 with C/o worsening of SOB which started about 3 weeks, started as MOORE and got progressively worsened with orthopnea and PND. She also noticed leg edema. Had flu like symptoms about 3 weeks back,  had intermittent substernal chest pain  with exertion. ,  Currently, denies any  chest pain. She used asthma medications and they did not help. No denies any right heart failure symptoms. No syncope. Not aware of recent weight gain. Compliant with medications. Recommended sleep study but did not get due to her insurance issues. Denies smoking/ETOH/ illicit drugs. F/H - her mother had CABG in her 46s. She had a stress test and echocardiogram more than 20 years ago and was told that she had a mitral valve prolapse and a stress test at that time was negative. She has not had follow up visits with any cardiologist over the last 10 years. ER work - /117, sat normal, creat 0.7. K normal .  CBC- Hb 10.9, BNP 1931, Tr 13>>10. TSH normal,     CXR- mild vascular congestion.  Tested positive for influenza B.      EKG: Normal sinus rhythm, left atrial enlargement, LVH by voltage criteria, nonspecific T wave changes, borderline QTC prolongation, abnormal EKG      Review of Systems:  Cardiac: As per HPI  General: No fever, chills  Pulmonary: As per HPI  GI: No nausea, vomiting  Musculoskeletal: SEGURA x 4, no focal motor deficits  Skin: Intact, no rashes  Neuro/Psych: No headache or seizures    Physical Exam:  BP (!) 158/102   Pulse 72   Temp 98 °F (36.7 °C) (Oral)   Resp 20   Ht 5' 5\" (1.651 m)   Wt 256 lb 14.4 oz (116.5 kg)   SpO2 97%   BMI 42.75 kg/m²   Appearance: Awake, alert, no acute respiratory distress  Skin: Intact, no rash  Head: Normocephalic, atraumatic  ENMT: MMM, no rhinorrhea  Neck: Supple, no carotid bruits  Lungs: Clear to auscultation bilaterally. No wheezes, rales, or rhonchi. Cardiac: Regular rate and rhythm, +S1S2, no murmurs apparent  Abdomen: Soft, +bowel sounds  Extremities: Moves all extremities x 4, no lower extremity edema  Neurologic: No focal motor deficits apparent, normal mood and affect    Telemetry findings reviewed: SR at rate 70, no new tachy/bradyarrhythmias overnight    EKG, labs and vitals were reviewed: As above      TTE-    Left ventricle is normal in size . No regional wall motion abnormalities seen. Ejection fraction is visually estimated at 45%. Indeterminate diastolic function. Mildly reduced left ventricular systolic dysfunction. Mildly dilated right ventricle. Mildly enlarged right atrium size. Mild aortic stenosis. Mild tricuspid regurgitation. Pulmonary hypertension is mild . Assessment:  Decompensated heart failure mostly diastolic heart failure secondary to hypertensive emergency  Poorly controlled hypertension, not well controlled. New onset LV dysfunction, echo results reviewed and EF appears in the 50s range with hypokinesis of the inferior and inferolateral walls. HLD on statin  Bronchial asthma  Morbid obesity with possible GRACE  GERD  OA      Plan: Will initiate her on guideline directed medical therapy for LV dysfunction. Continue lisinopril current dose 20 mg p.o. daily start on Coreg 6.25 mg p.o. twice daily.   Blood pressure is poorly controlled we will titrate medications with a blood pressure goal of less than 120/80  Echo results were reviewed, as above  We will plan for ischemic evaluation based on rpt echo results. Check proBNP level  Continue rosuvastatin 20 mg p.o. daily  Further recommendation pending above        Thank you for consulting and allowing us to participate in Ms. Loyd's care. Kenny Flores MD, Althea Mercado.   [facility]) Cardiology

## 2022-11-01 NOTE — DISCHARGE INSTRUCTIONS
HEART FAILURE  / CONGESTIVE HEART FAILURE  DISCHARGE INSTRUCTIONS:  GUIDELINES TO FOLLOW AT HOME    Self- Managed Care:     MEDICATIONS:  Take your medication as directed. If you are experiencing any side effects, inform your doctor, Do not stop taking any of your medications without letting your doctor know. Check with your doctor before taking any over-the-counter medications / herbal / or dietary supplements. They may interfere with your other medications. Do not take ibuprofen (Advil or Motrin) and naproxen (Aleve) without talking to your doctor first. They could make your heart failure worse. WEIGHT MONITORING:   Weigh yourself everyday (with the same scale) around the same time of the day and write it down. (you can chart them on a calendar or keep track of them on paper. Notify your doctor of a weight gain of 3 pounds or more in 1 day   OR a total of 5 pounds or more in 1 week    Take your weight record to your doctor visits  Also, the same goes if you loose more than 3# in one day, let your heart doctor know. DIET:   Cardiac heart healthy diet- Low saturated / low trans fat, no added salt, caffeine restricted, Low sodium diet-   No more than 2,000mg (2 grams) of salt / sodium per day (which equals to a little less than  a teaspoon of salt)  If your doctor wants you on a fluid restriction. ..it is usually recommended a fluid limit of 2,000cc -  Fluid restriction- 2,000 ml (milliliters) = 64 ounces = you can have 8 glasses of fluid per day (each glass 8 ounces)    Follow a low salt diet - avoid using salt at the table, avoid / limit use of canned soups, processed / packaged foods, salted snacks, olives and pickles. Do not use a salt substitute without checking with your doctor, they may contain a high amount of potassioum. (Mrs. Priest Her is safe to use).     Limit the use of alcohol       CALL YOUR DOCTOR THE FIRST DAY YOU NOTICE ANY OF THESE   SYMPTOMS:  You have a weight gain of 3 pounds or more in 1 day         OR 5 pounds or more in one week  More shortness of breath  More swelling of your stomach, legs, ankles or feet  Feeling more tired, No energy  Dry hacky cough  Dizziness  More chest pain / discomfort       (CALL 911 IF ANY OF THE FOLLOWING OCCURS  Chest pain (not relieved with nitroglycerine, if you have been prescribed this medication)  Severe shortness of breath  Faint / Pass out  Confusion / cannot think clearly  If symptoms get worse           SMOKING - TOBACCO USE:  * IF YOU SMOKE - STOP! Kick the habit. [address] Program is offered at [facility] and [address] [facility]. Call [phone] extension [phone] for more information. ACTIVITY:   (Ask your doctor when you will be able to return to work and before starting any exercise program.  Do not drive unless unless your doctor has given you permission to do so). Start light exercise. Even if you can only do a small amount, exercise will help you get stronger, have more energy, help manage your weight and decrease  stress. Walking is an easy way to get exercise. Start out slowly and  increase the amount you walk as tolerated  If you become short of breath, dizzy or have chest pain; stop, sit down, and rest.  If you feel \"wiped out\" the day after you exercise, walk at a slower pace or for a shorter distance. You can gradually increase the pace or amount of time. (Do not exercise right after a meal or in extreme temperatures, such as above 85 degrees, if the air is really humid, or wind chill is less than 20 degrees)                                             ADDITIONAL INFORMATION:  Avoid getting sick from colds and the flu. Stay away from friends or family that you know may have a contagious illness  Get plenty of rest   Get a flu shot each year. Get a pneumococcal vaccine shot.  If you have had one before, ask your doctor whether you need another dose. My Goal for Self-management of Heart Failure Includes 5 steps :    1. Notice a change in symptoms ( weight gain, short of breath, leg swelling, decreased activity level, bloating. ...)    2. Evaluate the change: (use the Heart Failure Zones )     3. Decide to take action: decide what your options are, such as: (call your doctor for an extra visit, take a prescribed medication, such as your water pill if your doctor has given you directions to do so, [address])    4. Come up with a strategy:  (now you call the doctor for advice / appointment. This is where you take action!!! Do not wait, catch the symptom early and treat it before it worsens. 5. Evaluate the response: The next day, check your Heart Failure Zones: are you in the GREEN ZONE (safe zone)? Worsening symptoms of YELLOW ZONE? Or have you moved to the RED ZONE and need to call 911 or go to the Emergency Room for evaluation? Call your doctor's office to update them on your symptoms of heart failure. Internal medicine    Follow ups  Please follow up with the internal medicine clinic at [facility] appointment has been scheduled for 11/9/2022 at 9 AM.   Please keep all other follow up appointments:   [facility] on 12/02/2022 at 1 PM  [address] [facility] CHF clinic on 11/17/2022 at 9:30 AM    Changes in healthcare   Please take all medications as indicated above  Diet:  regular diet with low sodium (2gm)    Activity: activity as tolerated  Additional labs, testing or imaging needed after discharge   None  New medication prescribed after this hospitalization are   Coreg 12.5 mg BID  Statin 20 mg daily  Lisinopril 20 mg daily  Vitamin D once weekly for 7 doses  Combivent 1 puff every 6hrs   Please refer to your Med list for details   Please contact us if you have any concerns, wish to change or make an appointment:  Internal medicine clinic   Phone: [phone]  Fax: [phone]  [facility] [address]  Or please call the nurse line at [phone]. Should you have further questions in regards to this visit, you can review your clinical note and after visit summary document on your [employer] account. Other questions can be directed to our nurse line at [phone]. Other than any new prescriptions given to you today, the list of home medications on this After Visit Summary are based on information provided to us from you and your healthcare providers. This information, including the list, dose, and frequency of medications is only as accurate as the information you provided. If you have any questions or concerns about your home medications, please contact your Primary Care Physician for further clarification.

## 2022-11-02 VITALS
HEIGHT: 65 IN | WEIGHT: 256.9 LBS | DIASTOLIC BLOOD PRESSURE: 85 MMHG | RESPIRATION RATE: 20 BRPM | SYSTOLIC BLOOD PRESSURE: 149 MMHG | TEMPERATURE: 97.7 F | BODY MASS INDEX: 42.8 KG/M2 | OXYGEN SATURATION: 99 % | HEART RATE: 73 BPM

## 2022-11-02 PROBLEM — I50.9 HEART FAILURE (HCC): Status: ACTIVE | Noted: 2022-11-02

## 2022-11-02 LAB
ANION GAP SERPL CALCULATED.3IONS-SCNC: 9 MMOL/L (ref 7–16)
BASOPHILS ABSOLUTE: 0.06 E9/L (ref 0–0.2)
BASOPHILS RELATIVE PERCENT: 0.9 % (ref 0–2)
BUN BLDV-MCNC: 15 MG/DL (ref 6–20)
CALCIUM SERPL-MCNC: 9.1 MG/DL (ref 8.6–10.2)
CHLORIDE BLD-SCNC: 105 MMOL/L (ref 98–107)
CO2: 26 MMOL/L (ref 22–29)
CREAT SERPL-MCNC: 0.8 MG/DL (ref 0.5–1)
EOSINOPHILS ABSOLUTE: 0.33 E9/L (ref 0.05–0.5)
EOSINOPHILS RELATIVE PERCENT: 5 % (ref 0–6)
GFR SERPL CREATININE-BSD FRML MDRD: >60 ML/MIN/1.73
GLUCOSE BLD-MCNC: 90 MG/DL (ref 74–99)
HCT VFR BLD CALC: 32 % (ref 34–48)
HEMOGLOBIN: 10.3 G/DL (ref 11.5–15.5)
IMMATURE GRANULOCYTES #: 0.01 E9/L
IMMATURE GRANULOCYTES %: 0.2 % (ref 0–5)
LYMPHOCYTES ABSOLUTE: 3.01 E9/L (ref 1.5–4)
LYMPHOCYTES RELATIVE PERCENT: 45.2 % (ref 20–42)
MCH RBC QN AUTO: 29.4 PG (ref 26–35)
MCHC RBC AUTO-ENTMCNC: 32.2 % (ref 32–34.5)
MCV RBC AUTO: 91.4 FL (ref 80–99.9)
MONOCYTES ABSOLUTE: 0.55 E9/L (ref 0.1–0.95)
MONOCYTES RELATIVE PERCENT: 8.3 % (ref 2–12)
NEUTROPHILS ABSOLUTE: 2.7 E9/L (ref 1.8–7.3)
NEUTROPHILS RELATIVE PERCENT: 40.4 % (ref 43–80)
PDW BLD-RTO: 12.9 FL (ref 11.5–15)
PLATELET # BLD: 212 E9/L (ref 130–450)
PMV BLD AUTO: 10.9 FL (ref 7–12)
POTASSIUM SERPL-SCNC: 3.9 MMOL/L (ref 3.5–5)
RBC # BLD: 3.5 E12/L (ref 3.5–5.5)
SODIUM BLD-SCNC: 140 MMOL/L (ref 132–146)
WBC # BLD: 6.7 E9/L (ref 4.5–11.5)

## 2022-11-02 PROCEDURE — 1200000000 HC SEMI PRIVATE

## 2022-11-02 PROCEDURE — 6370000000 HC RX 637 (ALT 250 FOR IP): Performed by: STUDENT IN AN ORGANIZED HEALTH CARE EDUCATION/TRAINING PROGRAM

## 2022-11-02 PROCEDURE — 85025 COMPLETE CBC W/AUTO DIFF WBC: CPT

## 2022-11-02 PROCEDURE — 2580000003 HC RX 258: Performed by: STUDENT IN AN ORGANIZED HEALTH CARE EDUCATION/TRAINING PROGRAM

## 2022-11-02 PROCEDURE — 99238 HOSP IP/OBS DSCHRG MGMT 30/<: CPT | Performed by: INTERNAL MEDICINE

## 2022-11-02 PROCEDURE — G0378 HOSPITAL OBSERVATION PER HR: HCPCS

## 2022-11-02 PROCEDURE — 6360000002 HC RX W HCPCS: Performed by: STUDENT IN AN ORGANIZED HEALTH CARE EDUCATION/TRAINING PROGRAM

## 2022-11-02 PROCEDURE — 6370000000 HC RX 637 (ALT 250 FOR IP): Performed by: INTERNAL MEDICINE

## 2022-11-02 PROCEDURE — 80048 BASIC METABOLIC PNL TOTAL CA: CPT

## 2022-11-02 PROCEDURE — 94640 AIRWAY INHALATION TREATMENT: CPT

## 2022-11-02 PROCEDURE — 36415 COLL VENOUS BLD VENIPUNCTURE: CPT

## 2022-11-02 PROCEDURE — 99232 SBSQ HOSP IP/OBS MODERATE 35: CPT | Performed by: INTERNAL MEDICINE

## 2022-11-02 PROCEDURE — 6360000002 HC RX W HCPCS

## 2022-11-02 PROCEDURE — 6370000000 HC RX 637 (ALT 250 FOR IP)

## 2022-11-02 RX ORDER — CARVEDILOL 6.25 MG/1
12.5 TABLET ORAL 2 TIMES DAILY WITH MEALS
Status: DISCONTINUED | OUTPATIENT
Start: 2022-11-02 | End: 2022-11-02 | Stop reason: HOSPADM

## 2022-11-02 RX ORDER — ALBUTEROL SULFATE 90 UG/1
2 AEROSOL, METERED RESPIRATORY (INHALATION) EVERY 6 HOURS PRN
Qty: 1 EACH | Refills: 6 | Status: SHIPPED | OUTPATIENT
Start: 2022-11-02

## 2022-11-02 RX ORDER — LISINOPRIL 20 MG/1
20 TABLET ORAL DAILY
Qty: 30 TABLET | Refills: 3 | Status: SHIPPED | OUTPATIENT
Start: 2022-11-03 | End: 2022-11-02 | Stop reason: SDUPTHER

## 2022-11-02 RX ORDER — ERGOCALCIFEROL 1.25 MG/1
50000 CAPSULE ORAL WEEKLY
Qty: 6 CAPSULE | Refills: 0 | Status: SHIPPED | OUTPATIENT
Start: 2022-11-07 | End: 2022-12-27

## 2022-11-02 RX ORDER — ROSUVASTATIN CALCIUM 20 MG/1
20 TABLET, COATED ORAL NIGHTLY
Qty: 30 TABLET | Refills: 3 | Status: SHIPPED | OUTPATIENT
Start: 2022-11-02 | End: 2022-11-02 | Stop reason: SDUPTHER

## 2022-11-02 RX ORDER — CARVEDILOL 12.5 MG/1
12.5 TABLET ORAL 2 TIMES DAILY WITH MEALS
Qty: 60 TABLET | Refills: 2 | Status: SHIPPED | OUTPATIENT
Start: 2022-11-02

## 2022-11-02 RX ORDER — ERGOCALCIFEROL 1.25 MG/1
50000 CAPSULE ORAL WEEKLY
Qty: 5 CAPSULE | Refills: 0 | Status: SHIPPED | OUTPATIENT
Start: 2022-11-07 | End: 2022-11-02 | Stop reason: SDUPTHER

## 2022-11-02 RX ORDER — CARVEDILOL 12.5 MG/1
12.5 TABLET ORAL 2 TIMES DAILY WITH MEALS
Qty: 60 TABLET | Refills: 3 | Status: SHIPPED | OUTPATIENT
Start: 2022-11-02 | End: 2022-11-02 | Stop reason: SDUPTHER

## 2022-11-02 RX ORDER — CETIRIZINE HYDROCHLORIDE 10 MG/1
10 TABLET ORAL DAILY PRN
Qty: 30 TABLET | Refills: 4 | Status: SHIPPED | OUTPATIENT
Start: 2022-11-02

## 2022-11-02 RX ORDER — LISINOPRIL 20 MG/1
20 TABLET ORAL DAILY
Qty: 30 TABLET | Refills: 2 | Status: SHIPPED | OUTPATIENT
Start: 2022-11-03

## 2022-11-02 RX ORDER — ROSUVASTATIN CALCIUM 20 MG/1
20 TABLET, COATED ORAL NIGHTLY
Qty: 30 TABLET | Refills: 2 | Status: SHIPPED | OUTPATIENT
Start: 2022-11-02

## 2022-11-02 RX ADMIN — CARVEDILOL 6.25 MG: 6.25 TABLET, FILM COATED ORAL at 08:58

## 2022-11-02 RX ADMIN — FUROSEMIDE 40 MG: 10 INJECTION, SOLUTION INTRAMUSCULAR; INTRAVENOUS at 08:59

## 2022-11-02 RX ADMIN — BUDESONIDE INHALATION SUSPENSION 500 MCG: 0.5 SUSPENSION RESPIRATORY (INHALATION) at 08:42

## 2022-11-02 RX ADMIN — LISINOPRIL 20 MG: 20 TABLET ORAL at 08:58

## 2022-11-02 RX ADMIN — ARFORMOTEROL TARTRATE 15 MCG: 15 SOLUTION RESPIRATORY (INHALATION) at 08:42

## 2022-11-02 RX ADMIN — ENOXAPARIN SODIUM 40 MG: 100 INJECTION SUBCUTANEOUS at 08:58

## 2022-11-02 RX ADMIN — Medication 10 ML: at 08:59

## 2022-11-02 RX ADMIN — CARVEDILOL 12.5 MG: 6.25 TABLET, FILM COATED ORAL at 17:06

## 2022-11-02 RX ADMIN — ACETAMINOPHEN 650 MG: 325 TABLET, FILM COATED ORAL at 12:04

## 2022-11-02 ASSESSMENT — PAIN DESCRIPTION - DESCRIPTORS: DESCRIPTORS: ACHING;DISCOMFORT;THROBBING

## 2022-11-02 ASSESSMENT — PAIN DESCRIPTION - LOCATION: LOCATION: HEAD

## 2022-11-02 ASSESSMENT — PAIN SCALES - GENERAL
PAINLEVEL_OUTOF10: 0
PAINLEVEL_OUTOF10: 6
PAINLEVEL_OUTOF10: 0

## 2022-11-02 ASSESSMENT — PAIN - FUNCTIONAL ASSESSMENT: PAIN_FUNCTIONAL_ASSESSMENT: ACTIVITIES ARE NOT PREVENTED

## 2022-11-02 ASSESSMENT — PAIN DESCRIPTION - ORIENTATION: ORIENTATION: LEFT

## 2022-11-02 NOTE — CONSULTS
CHF NURSE NAVIGATOR CONSULT NOTE:      Patient currently admitted with diagnosis of Diastolic heart failure. Patient was alert and oriented during the consultation. Telephonic consultation was obtained due to isolation status. She was engaged and asked appropriate questions throughout the education session. She is agreeable to self monitoring, outpatient follow up, medication compliance, following a low sodium diet. Scheduling with the CHF clinic, cardiology APRN, and PCP Yes. Future Appointments   Date Time Provider Yesica Annette   11/9/2022  9:00 AM Yonatan Sloan MD [facility]   11/17/2022  9:30 AM [facility] ROOM 1 [facility]   12/1/2022  1:00 PM Penny Reyes APRN - CNP [facility] CARDIO [facility]       Barriers to Care:  Contributing risk factors for Heart Failure are identified as none. The patient is ordered:  Diet: ADULT DIET; Regular; Low Sodium (2 gm)   Sodium controlled diet Yes  Fluid restriction daily ordered (fluid restriction recommended if patient is hyponatremic and/or diuretic is initiated or increased) No  FR:   Daily Weights: Patient Vitals for the past 96 hrs (Last 3 readings):   Weight   10/31/22 0615 256 lb 14.4 oz (116.5 kg)   10/30/22 0849 287 lb (130.2 kg)     I/O:   Intake/Output Summary (Last 24 hours) at 11/2/2022 1306  Last data filed at 11/1/2022 1402  Gross per 24 hour   Intake 240 ml   Output 500 ml   Net -260 ml              We reviewed the introduction to Heart Failure, the HF zones, signs and symptoms to report on day 1 of onset, medications, medication compliance, the importance of obtaining daily weights, following a low sodium diet, reading food labels for the sodium content, keeping physician appointments, and smoking cessation. We discussed writing / tracking daily weights on a calendar / log because a 5 pound gain in 1 week can sneak up if you are not tracking it.           I advised patient they can reduce the risk for Heart Failure exacerbations by modifying / controlling the risk factors. We discussed self-managed care which includes the following:  to take medications as prescribed, report any intolerable side effects of medications to the cardiologist / doctor, do not just stop taking the medication; follow a cardiac heart healthy / low sodium diet; weigh yourself daily, exercise regularly- per doctor recommendation and not to smoke or use an excess amount of alcohol. We discussed calling the cardiologist / doctor with a weight gain of 3 pounds in one day or a total of 5 pounds or more in one week. Also, if you should have a significant weight loss of 3# or more in one day to call the doctor, they may need to decrease or hold the diuretic dose. On days you feels nauseated and not eating / drinking, having emesis or diarrhea,  informed to call the cardiologist  / doctor, they may need to decrease or hold diuretic to avoid dehydration. I stressed the importance of informing their cardiologist the first day of onset of any of the signs and symptoms in the \"Yellow Zone\" of the HF Zones. Patient verbalizes understanding. Greater than 30 minutes was spent educating patient. The Heart Failure Booklet given to the patient with additional patient education addressing:  What is Heart Failure? Things You Can Do to Live Well with HF  How to Take Your Medications  How to Eat Less Salt  [place] its Salt?   Exercising Well with Heart Failure  Signs and symptoms of HF to report  Weight Yourself Each Day  Home Self Management- activity, weight tracking, taking medications as prescribed, meals /diet planning (sodium and fluid restriction), how to read food labels, keeping physician follow ups, smoking cessation, follow the Heart Failure Zones  The Heart Failure zones  Every Dose Every Day      Instructed  to call 911 if you have any of the following symptoms:       Struggling to breathe unrelieved with rest,     Having chest pain     Have confusion or cant think clearly          Stephie Thorne RN BSN, RN  Heart Failure Navigator        CONGESTIVE HEART FAILURE (CHF) AHA GUIDELINES  (Must be completed for Primary Diagnosis CHF or History of CHF)    Discharge Plan:  I placed the Heart Failure Home Instructions in patient's discharge instructions. Per Heart Failure GWTG, the patient should have a follow-up appointment made within 7 days of discharge.     New Diagnosis Yes    ECHO Results most recent:  No results found for: LVEF, LVEFMODE                                     Social History     Tobacco Use   Smoking Status Never   Smokeless Tobacco Never        Immunization History   Administered Date(s) Administered    COVID-19, J[employer]J, (age 18y+), IM, 0.5 mL 05/04/2021          Angiotensin-Converting-Enzyme (ACE) inhibitor ordered:  [x] Yes  [] No (specify contraindication):    [] Contraindicated  [] Hypotensive patient who was at immediate risk of cardiogenic shock  [] Hospitalized patient who experienced marked azotemia  [] Other Contraindications  [] Not Eligible  [] Not Tolerant  [] Patient Reason  [] System Reason  [] Other Reason    Angiotensin II receptor blockers (ARB) ordered:  [] Yes  [x] No (specify contraindication):    [] Contraindicated  [] Hypotensive patient who was at immediate risk of cardiogenic shock  [] Hospitalized patient who experienced marked azotemia  [] Other Contraindications    ARNI - Angiotensin Receptor Neprilysin Inhibitor ordered:  [] Yes  [x] No (specify contraindication):    [] ACE inhibitor use within the prior 36 hours  [] Allergy  [] Hyperkalemia  [] Hypotension  [] Renal dysfunction defined as creatinine > 2.5 mg/dL in men or > 2.0 mg/dL in women  [] Other Contraindications  [] Not Eligible  [] Not Tolerant  [] Patient Reason  []System Reason  []Other Reason      Beta Blocker ordered:    [x] Yes Coreg  [] No (specify contraindication):    [] Contraindicated  [] Asthma  [] Fluid Overload  [] Low Blood Pressure  [] Patient recently treated with an intravenous positive inotropic agent  [] Other Contraindications  [] Not Eligible  [] Not Tolerant  [] Patient Reason  [] System Reason    SGLT2 Inhibitor ordered:  [] Yes  [x] No (specify contraindication):    [] Contraindicated  [] Patient currently on dialysis  [] Ketoacidosis  [] Known hypersensitivity to the medication  [] Type I diabetes (not approved for use in patients with Type I diabetes due to increased risk of ketoacidosis)  [] Other Contraindications  [] Not Eligible  [] Not Tolerant  [] Patient Reason  [] System Reason  [] Other Reason    Aldosterone Antagonist ordered:  [] Yes  [x] No (specify contraindication):    [] Contraindicated  [] Allergy due to aldosterone receptor antagonist  [] Hyperkalemia  [] Renal dysfunction defined as creatinine >2.5 mg/dL in men or >2.0 mg/dL in women.   [] Other contraindications  [] Not Eligible  [] Not Tolerant  [] Patient Reason  [] System Reason  [] Other Reason

## 2022-11-02 NOTE — PROGRESS NOTES
[facility]  Internal Medicine Residency Program  Progress Note - House Team     Patient:  Sal Nuno 59 y.o. female MRN: [id number]     Date of Service: 11/2/2022     CC: SOB starting 2-3 weeks ago and non-productive cough/orthopnea. Stopped all meds in 2018  Overnight events: No overnight events     Subjective     Patient was seen and examined this morning at bedside in no acute distress. Overnight no acute changes. Cardiology consult--> decompensated HF mostly diastolic 2/2 HTN emergency and new onset LV dysfunction. Added coreg 6.25 mg BID, cardiology titrate as needed. ECHO complete 2D showed new onset LV dysfunction with EF 45% and repeat limited ECHO EF 40-45% and stage II diastolic dysfunction. ? Stress test after 5 days in isolation. Objective     Physical Exam:  Vitals: BP (!) 140/83   Pulse 77   Temp 97.6 °F (36.4 °C) (Tympanic)   Resp 20   Ht 5' 5\" (1.651 m)   Wt 256 lb 14.4 oz (116.5 kg)   SpO2 99%   BMI 42.75 kg/m²     I & O - 24hr: No intake/output data recorded. General Appearance: alert, appears stated age, cooperative, and no distress  HEENT:  Head: Normocephalic, no lesions, without obvious abnormality. Neck: no adenopathy and no JVD  Lung:  diminished breath sounds present bilaterally  Heart: regular rate and rhythm, S1, S2 normal, no murmur, click, rub or gallop  Abdomen: soft, non-tender; bowel sounds normal; no masses,  no organomegaly  Extremities:  extremities normal, atraumatic, no cyanosis or edema  Musculokeletal: No joint swelling, no muscle tenderness. ROM normal in all joints of extremities.    Neurologic: Mental status: Alert, oriented, thought content appropriate    Subjective  Pertinent Labs & Imaging Studies     CBC with Differential:    Lab Results   Component Value Date/Time    WBC 6.7 11/02/2022 05:06 AM    RBC 3.50 11/02/2022 05:06 AM    HGB 10.3 11/02/2022 05:06 AM    HCT 32.0 11/02/2022 05:06 AM     11/02/2022 05:06 AM    MCV 91.4 11/02/2022 05:06 AM    MCH 29.4 11/02/2022 05:06 AM    MCHC 32.2 11/02/2022 05:06 AM    RDW 12.9 11/02/2022 05:06 AM    SEGSPCT 78 12/06/2013 01:16 PM    LYMPHOPCT 45.2 11/02/2022 05:06 AM    MONOPCT 8.3 11/02/2022 05:06 AM    BASOPCT 0.9 11/02/2022 05:06 AM    MONOSABS 0.55 11/02/2022 05:06 AM    LYMPHSABS 3.01 11/02/2022 05:06 AM    EOSABS 0.33 11/02/2022 05:06 AM    BASOSABS 0.06 11/02/2022 05:06 AM     BMP:    Lab Results   Component Value Date/Time     11/02/2022 05:06 AM    K 3.9 11/02/2022 05:06 AM    K 3.8 10/30/2022 09:17 AM     11/02/2022 05:06 AM    CO2 26 11/02/2022 05:06 AM    BUN 15 11/02/2022 05:06 AM    LABALBU 3.9 10/30/2022 09:17 AM    LABALBU 4.3 03/09/2012 10:43 AM    CREATININE 0.8 11/02/2022 05:06 AM    CALCIUM 9.1 11/02/2022 05:06 AM    GFRAA >60 03/13/2015 06:10 AM    LABGLOM >60 11/02/2022 05:06 AM    GLUCOSE 90 11/02/2022 05:06 AM    GLUCOSE 91 03/09/2012 10:43 AM       NM LUNG VENT/PERFUSION (VQ)   Final Result   Low probability for pulmonary embolus. XR CHEST (2 VW)   Final Result   1. Interval change and findings suspicious for low-grade CHF. Details   above. Bronchitis would be included in differential.      2.  Bilateral hilar soft tissue prominence. Consider further correlation   with CT chest.              Resident's Assessment and Plan     Patient is a 64-year-old female with past medical history significant for asthma, depression hypertension, dyslipidemia, mitral valve prolapse, OA, and obesity who presented to the ED with chief complaint of shortness of breath for 2 to 3 weeks and nonproductive cough as well as orthopnea.     Assessment and Plan:    SOB 2- 3 weeks 2/2 new onset HF vs HTN emergency vs nonischemic cardiomyopathy vs asthma exacerbation  Was diuresed with IV furosemide 40 mg   Cardiology consulted, ECHO 2D and limited showing new onset LV dysfunction with EF of 40 to 45% with hypokinesis of the inferior and inferolateral walls  Started on Coreg 6.25 mg twice daily, cardiology will titrate as needed to maintain goal BP of 120/80  Continue statin 20 mg daily, continue lisinopril 20 mill once daily    2. New onset decompensated HF 2/2 most likely to HTN emergency   Cardiology consulted, ECHO 2D and repeat ECHO limited showing new onset LV dysfunction with EF of 40 to 45% with hypokinesis of the inferior and inferolateral walls  Started on Coreg 6.25 mg twice daily, cardiology will titrate as needed to maintain goal BP of 120/80  Continue statin 20 mg daily, continue lisinopril 20 mill once daily    3. HTN emergency   Blood pressure on admission 195/117  Cardiology following, blood pressure goal of less than 120/80--> will titrate Coreg as needed  As needed hydralazine and labetalol PRN    4. Tachycardia 2/2 anxiety vs orthostatic hypotension--> resolved    5. Hyperlipidemia, ASCVD 15.6%  Cholesterol of 252 and LDL of 154 on lipid panel  Continue statin to 20 mg daily    6. Influenza B infection, asymptomatic  Patient admits to improvement with breathing, continue to monitor vitals  Brovana and Pulmicort breathing treatments scheduled  DuoNeb as needed    7.  Hx of asthma ?exacerbation from viral infection  --> improving  Brovana and Pulmicort breathing treatments scheduled  DuoNeb as needed    PT/OT evaluation: not indicated  DVT prophylaxis/ GI prophylaxis: enoxaparin 40 mg SubQ/ diet low sodium   Disposition: continue current care    Lori Reeves DO, PGY-1  Attending physician: Dr. Annette Cruz

## 2022-11-02 NOTE — PROGRESS NOTES
INPATIENT CARDIOLOGY FOLLOW-UP    Name: Ciera Melvin    Age: 59 y.o. Date of Admission: 10/30/2022  8:54 AM    Date of Service: 11/2/2022    Chief Complaint: Follow-up for Newly diagnosed heart failure with midrange EF and shortness of breath    Interim History:  No new overnight cardiac complaints. Feeling much better,still has cough without any dyspnea or fever. Currently with no complaints of CP, SOB, palpitations, dizziness, or lightheadedness. SR on telemetry. Review of Systems:   Cardiac: As per HPI  General: No fever, chills  Pulmonary: As per HPI  HEENT: No visual disturbances, difficult swallowing  GI: No nausea, vomiting  Endocrine: No thyroid disease or DM  Musculoskeletal: SEGURA x 4, no focal motor deficits  Skin: Intact, no rashes  Neuro/Psych: No headache or seizures    Problem List:  Patient Active Problem List   Diagnosis    Shoulder pain, left    Hypertension    Asthma    Osteoarthritis    Hyperlipidemia    Depression    Allergic rhinitis    Incisional hernia    Family history of colon cancer    Post-op pain    Abdominal pain, acute, bilateral lower quadrant    Diverticulitis of colon    Nausea without vomiting    Abdominal pain, right lower quadrant    Diverticulitis    Abnormal carotid pulse    Acute congestive heart failure, unspecified heart failure type (HCC)    Influenza B    CHF (congestive heart failure), NYHA class I, acute on chronic, combined ([facility] [address]    Heart failure (HCC)       Allergies:   Allergies   Allergen Reactions    Doxycycline Swelling and Other (See Comments)    Iodine Hives     IV dye    Toradol [Ketorolac Tromethamine] Anaphylaxis    Iodides Hives       Current Medications:  Current Facility-Administered Medications   Medication Dose Route Frequency Provider Last Rate Last Admin    lisinopril (PRINIVIL;ZESTRIL) tablet 20 mg  20 mg Oral Daily Iam Wise MD   20 mg at 11/02/22 0858    enoxaparin (LOVENOX) injection 40 mg  40 mg SubCUTAneous Daily Michelle Chang MD   40 mg at 11/02/22 0858    ipratropium-albuterol (DUONEB) nebulizer solution 1 ampule  1 ampule Inhalation Q4H PRN Gurpreet Boland MD        perflutren lipid microspheres (DEFINITY) injection 1.65 mg  1.5 mL IntraVENous ONCE PRN Dereck Martinez MD        carvedilol (COREG) tablet 6.25 mg  6.25 mg Oral BID WC Bethanie Greer MD   6.25 mg at 11/02/22 6679    vitamin D (ERGOCALCIFEROL) capsule 50,000 Units  50,000 Units Oral Weekly Gurpreet Boland MD   50,000 Units at 10/31/22 1511    hydrALAZINE (APRESOLINE) injection 10 mg  10 mg IntraVENous Q6H PRN Gurpreet Boland MD   10 mg at 10/31/22 0639    sodium chloride flush 0.9 % injection 5-40 mL  5-40 mL IntraVENous 2 times per day John Sarabia MD   10 mL at 11/02/22 0859    sodium chloride flush 0.9 % injection 5-40 mL  5-40 mL IntraVENous PRN John Sarabia MD        0.9 % sodium chloride infusion   IntraVENous PRN Fran Mcclure MD        ondansetron (ZOFRAN-ODT) disintegrating tablet 4 mg  4 mg Oral Q8H PRN Fran Mcclure MD        Or    ondansetron (ZOFRAN) injection 4 mg  4 mg IntraVENous Q6H PRN Fran Mcclure MD        polyethylene glycol (GLYCOLAX) packet 17 g  17 g Oral Daily PRN Fran Acosta MD        acetaminophen (TYLENOL) tablet 650 mg  650 mg Oral Q6H PRN Fran Acosta MD   650 mg at 11/02/22 1204    Or    acetaminophen (TYLENOL) suppository 650 mg  650 mg Rectal Q6H PRN Fran Mcclure MD        glucose chewable tablet 16 g  4 tablet Oral PRN Fran Mcclure MD        dextrose bolus 10% 125 mL  125 mL IntraVENous PRN Fran Mcclure MD        Or    dextrose bolus 10% 250 mL  250 mL IntraVENous PRN Fran Mcclure MD        glucagon (rDNA) injection 1 mg  1 mg SubCUTAneous PRN Fran Mcclure MD        dextrose 10 % infusion   IntraVENous Continuous PRN Fran Mcclure MD        labetalol (NORMODYNE;TRANDATE) injection 10 mg  10 mg IntraVENous Q4H PRN Gurpreet Boland MD   10 mg at 10/30/22 4155    furosemide (LASIX) injection 40 mg  40 mg IntraVENous Daily Fran Mcclure MD   40 mg at 11/02/22 0859    budesonide (PULMICORT) nebulizer suspension 500 mcg  0.5 mg Nebulization BID Fran Gusman MD   500 mcg at 11/02/22 0842    Arformoterol Tartrate (BROVANA) nebulizer solution 15 mcg  15 mcg Nebulization BID Fran Mcclure MD   15 mcg at 11/02/22 0842    rosuvastatin (CRESTOR) tablet 20 mg  20 mg Oral Nightly Fran Mcclure MD   20 mg at 11/01/22 2154      sodium chloride      dextrose         Physical Exam:  BP (!) 176/92   Pulse 80   Temp 98.3 °F (36.8 °C) (Oral)   Resp 24   Ht 5' 5\" (1.651 m)   Wt 256 lb 14.4 oz (116.5 kg)   SpO2 100%   BMI 42.75 kg/m²   Wt Readings from Last 3 Encounters:   10/31/22 256 lb 14.4 oz (116.5 kg)   02/26/19 208 lb (94.3 kg)   05/25/18 280 lb (127 kg)     Appearance: Awake, alert, no acute respiratory distress  Skin: Intact, no rash  Head: Normocephalic, atraumatic  Eyes: EOMI, no conjunctival erythema  ENMT: No pharyngeal erythema, MMM, no rhinorrhea  Neck: Supple, no elevated JVP, no carotid bruits  Lungs: Clear to auscultation bilaterally. No wheezes, rales, or rhonchi. Cardiac: Regular rate and rhythm, +S1S2, no murmurs apparent  Abdomen: Soft, nontender, +bowel sounds  Extremities: Moves all extremities x 4, no lower extremity edema  Neurologic: No focal motor deficits apparent, normal mood and affect  Peripheral Pulses: Intact posterior tibial pulses bilaterally    Intake/Output:  No intake or output data in the 24 hours ending 11/02/22 1452  No intake/output data recorded. Laboratory Tests:  Recent Labs     11/01/22  0415 11/01/22  1316 11/02/22  0506    143 140   K 3.4* 3.5 3.9   * 107 105   CO2 24 24 26   BUN 21* 19 15   CREATININE 0.9 0.9 0.8   GLUCOSE 94 97 90   CALCIUM 8.7 9.1 9.1     Lab Results   Component Value Date/Time    MG 2.4 12/06/2013 01:16 PM     No results for input(s): ALKPHOS, ALT, AST, PROT, BILITOT, BILIDIR, LABALBU in the last 72 hours.   Recent Labs     10/31/22  0411 11/01/22  0415 11/02/22  0506   WBC 8.4 9.4 6.7   RBC 3.56 3.29* 3.50   HGB 10.6* 9.7* 10.3*   HCT 32.4* 30.0* 32.0*   MCV 91.0 91.2 91.4   MCH 29.8 29.5 29.4   MCHC 32.7 32.3 32.2   RDW 12.8 13.2 12.9    222 212   MPV 11.9 11.6 10.9     No results found for: CKTOTAL, CKMB, CKMBINDEX, TROPONINI  Lab Results   Component Value Date    INR 1.2 03/08/2015    PROTIME 13.2 (H) 03/08/2015     Lab Results   Component Value Date    TSH 0.780 10/30/2022     Lab Results   Component Value Date    LABA1C 5.1 10/30/2022     No results found for: EAG  Lab Results   Component Value Date    CHOL 252 (H) 10/30/2022    CHOL 228 (H) 05/01/2013    CHOL 221 (H) 03/09/2012     Lab Results   Component Value Date    TRIG 59 10/30/2022    TRIG 78 05/01/2013    TRIG 82 03/09/2012     Lab Results   Component Value Date    HDL 86 10/30/2022    HDL 67.0 05/01/2013    HDL 66.0 03/09/2012     Lab Results   Component Value Date    LDLCALC 154 (H) 10/30/2022    LDLCALC 145 (H) 05/01/2013    LDLCALC 139 (H) 03/09/2012     Lab Results   Component Value Date    LABVLDL 12 10/30/2022     No results found for: [facility]    Cardiac Tests:  Telemetry findings reviewed: SR at rate 70, no new tachy/bradyarrhythmias overnight     EKG, labs and vitals were reviewed: As above     Labs-proBNP 1931>> 913     TTE-10/31/2022   Left ventricle is normal in size . No regional wall motion abnormalities seen. Ejection fraction is visually estimated at 45%. Indeterminate diastolic function. Mildly reduced left ventricular systolic dysfunction. Mildly dilated right ventricle. Mildly enlarged right atrium size. Mild aortic stenosis. Mild tricuspid regurgitation. Pulmonary hypertension is mild . Limited echo with contrast on 11/1/2022:  Mildly dilated left ventricle. Ejection fraction is visually estimated at 40-45%. Overall ejection fraction mild-to-moderately decreased .    Anterior and anterolateral walls are hypokinetic. Moderate concentric left ventricular hypertrophy. Stage II diastolic dysfunction. Technically difficult examination. Definity echo contrast was used to   delineate endocardial borders. Assessment:  Decompensated heart failure mostly diastolic heart failure secondary to hypertensive emergency>> improved and appears euvolemic  Poorly controlled hypertension, not well controlled>> current blood pressure 176/92  New onset LV dysfunction, echo results reviewed and EF appears in the 50s range with hypokinesis of the inferior and inferolateral walls. HLD on statin  Bronchial asthma  Morbid obesity with possible GRACE  GERD  OA        Plan:    Continue lisinopril current dose 20 mg p.o. daily. Increase Coreg to 12.5 mg p.o. twice daily. Her blood pressure remains elevated then consider increasing lisinopril to 20 twice daily. Follow up with primary service for monitoring BP in one week, and adjust medications as needed. Blood pressure is poorly controlled we will titrate medications with a blood pressure goal of less than 120/80  Echo results were reviewed, as above and discussed with the patient. Has new LV dysfunction  We will plan for ischemic evaluation preferably cardiac cath as outpatient and patient does not have any active cardiac symptoms. Currently patient is also in isolation due to influenza infection. Continue rosuvastatin 20 mg p.o. daily  She is stable from the cardiology standpoint for discharge, follow-up with me in the office in 2 to 3 weeks. Lisa Krueger MD., Yuridia Bolton.   [facility]) Cardiology

## 2022-11-02 NOTE — PROGRESS NOTES
[address] [facility]  Internal Medicine Residency / [address]    Attending Physician Statement  I have discussed the case, including pertinent history and exam findings with the resident and the team.  I have seen and examined the patient and the key elements of the encounter have been performed by me. I agree with the assessment, plan and orders as documented by the resident. Less SOB  Managing optimal therapy for systolic CHF  Ischemic possibilities to be ruled out  Consider Nuclear stress  Resolving Influenza B  Plan:Follow with Cardiology    Remainder of medical problems as per resident note.       Vanita Johnson MD [facility] SPECIALTY [facility]  Internal Medicine Residency Faculty

## 2022-11-02 NOTE — DISCHARGE SUMMARY
[address]  Discharge Summary    PCP: Aundrea Bonilla MD    Admit Date:10/30/2022  Discharge Date: 11/2/2022    Chief Complaint   Patient presents with    Shortness of Breath     Started 2 weeks ago    Cough     Pt had fever and cough and headache 3 weeks ago and it has turned into SOB. Pt stopped all meds in 2018         Admission Diagnosis:   Shortness of breath 2/2 flash pulmonary edema from hypertensive emergency versus new onset heart failure versus ischemic/nonischemic cardiomyopathy  Hypertensive emergency  History of hyperlipidemia  Influenza B infection    Discharge Diagnosis:  Shortness of breath 2/2 new onset diastolic HF in setting of HTN emergency - resolving   Hypertensive emergency due to medication non compliance - stable  New onset decompensated HF and LV dysfunction with EF 40-45% - stable  History of hyperlipidemia - chronic  Influenza B infection - resolving  History of asthma - stable    Hospital Course:     Patient is a 57-year-old female with past medical history of asthma, diverticulitis, hypertension, hyperlipidemia, OA, obesity, and MVP who presented to the ED with chief complaint of shortness of breath for 2-3 weeks with a nonproductive cough, as well as orthopnea. Patient mentioned that she had not seen a doctor in the last 3 to 4 years and was previously diagnosed with hypertension and hyperlipidemia, but stopped taking all prescribed medications in 2018. Work-up in the ED revealed positive influenza B test as well as tachycardia. Patient was outside Tamiflu window. Blood pressure in ED was 195/117. IV BP medications were given. Breathing treatments were started as patient was actively wheezing in the ED and steroids were given. VQ scan was ordered revealing low probability for PE. Patient remained afebrile with no leukocytosis. Patient was admitted for further care and management.      Cardiology was consulted for signs of acute heart failure as well as pulmonary edema. ECHO complete 2D revealed mildly reduced left ventricular systolic dysfunction. Repeat ECHO limited was performed revealing stage II diastolic dysfunction with mildly dilated left ventricle and hypokinesis of the inferior and inferolateral walls. EF was 40 to 45%. Per cardiology, new onset decompensated HF most likely diastolic secondary to hypertensive emergency in the setting of poorly controlled hypertension. Patient was started on Coreg 6.25 mg twice daily, dosage was titrated up to 12.5 mg twice daily, which patient is to continue outpatient. Patient was also started on lisinopril 20 mg once daily. If blood pressure remains elevated consider increasing lisinopril to twice daily on outpatient follow up. Patient was also started on a statin 20 mg daily as well as vitamin D daily once weekly for 7 doses. Combivent was started on discharge as patient has history of asthma as well as current influenza B infection and was receiving scheduled breathing treatments inpatient. Most other home medications prior to admission were discontinued on discharge as patient reported that she had not been actively taking any home medications for the past couple of years. Per cardiology, plan for ischemic evaluation preferably cardiac cath as outpatient due to patient currently being asymptomatic and in isolation due to influenza infection. Follow-up with cardiology in 2 to 3 weeks. Appointment with the [facility] has already been scheduled. On day of discharge patient was hemodynamically stable and in no acute distress. Patient was informed of medication changes as well as follow-ups and agreeable to the plans with no further questions.       Pending test results: None    Consults:  Cardiology    Procedures:  None    Condition at discharge: Stable    Disposition: home    Time taken for discharge : < 30 mins [] >30 mins [x]    Discharge Medications:  Current Discharge Medication List        START taking these medications    Details   albuterol-ipratropium (COMBIVENT RESPIMAT)  MCG/ACT AERS inhaler Inhale 1 puff into the lungs every 6 hours  Qty: 1 each, Refills: 1      rosuvastatin (CRESTOR) 20 MG tablet Take 1 tablet by mouth nightly  Qty: 30 tablet, Refills: 3      lisinopril (PRINIVIL;ZESTRIL) 20 MG tablet Take 1 tablet by mouth daily  Qty: 30 tablet, Refills: 3      Ergocalciferol (VITAMIN D) 81676 units CAPS Take 50,000 Units by mouth once a week for 7 doses  Qty: 5 capsule, Refills: 0      carvedilol (COREG) 12.5 MG tablet Take 1 tablet by mouth 2 times daily (with meals)  Qty: 60 tablet, Refills: 3           CONTINUE these medications which have CHANGED    Details   albuterol sulfate HFA (PROVENTIL;VENTOLIN;PROAIR) 108 (90 Base) MCG/ACT inhaler Inhale 2 puffs into the lungs every 6 hours as needed for Wheezing or Shortness of Breath  Qty: 1 each, Refills: 6      cetirizine (ZYRTEC) 10 MG tablet Take 1 tablet by mouth daily as needed for Allergies  Qty: 30 tablet, Refills: 4    Associated Diagnoses: Allergic rhinitis; Asthma           CONTINUE these medications which have NOT CHANGED    Details   ibuprofen (IBU) 800 MG tablet Take 1 tablet by mouth every 8 hours as needed for Pain  Qty: 21 tablet, Refills: 0      Acetaminophen 650 MG TABS Take 500 mg by mouth every 6 hours as needed for Pain  Qty: 20 tablet, Refills: 0      fluticasone (FLONASE) 50 MCG/ACT nasal spray 1 spray by Nasal route daily  Qty: 1 Bottle, Refills: 4    Associated Diagnoses:  Allergic rhinitis           STOP taking these medications       amLODIPine (NORVASC) 10 MG tablet Comments:   Reason for Stopping:         hydrochlorothiazide (HYDRODIURIL) 25 MG tablet Comments:   Reason for Stopping:         loratadine-pseudoephedrine (CLARITIN-D 12 HOUR) 5-120 MG per extended release tablet Comments:   Reason for Stopping:         naproxen (NAPROSYN) 500 MG tablet Comments: Reason for Stopping:         dicyclomine (BENTYL) 10 MG capsule Comments:   Reason for Stopping:         FLUoxetine (PROZAC) 10 MG capsule Comments:   Reason for Stopping:         omeprazole (PRILOSEC) 40 MG capsule Comments:   Reason for Stopping:         meloxicam (MOBIC) 15 MG TABS Comments:   Reason for Stopping: Follow-up appointments:   Internal Medicine Clinic on 11/09/2022 at [place] [address] CHF clinic on 11/17/2022 at 9:30 AM  [facility] on 12/02/2022 at 1 PM    Patient Instructions:     HEART FAILURE  / CONGESTIVE HEART FAILURE  DISCHARGE INSTRUCTIONS:  GUIDELINES TO FOLLOW AT HOME    Self- Managed Care:     MEDICATIONS:  Take your medication as directed. If you are experiencing any side effects, inform your doctor, Do not stop taking any of your medications without letting your doctor know. Check with your doctor before taking any over-the-counter medications / herbal / or dietary supplements. They may interfere with your other medications. Do not take ibuprofen (Advil or Motrin) and naproxen (Aleve) without talking to your doctor first. They could make your heart failure worse. WEIGHT MONITORING:   Weigh yourself everyday (with the same scale) around the same time of the day and write it down. (you can chart them on a calendar or keep track of them on paper. Notify your doctor of a weight gain of 3 pounds or more in 1 day   OR a total of 5 pounds or more in 1 week    Take your weight record to your doctor visits  Also, the same goes if you loose more than 3# in one day, let your heart doctor know. DIET:   Cardiac heart healthy diet- Low saturated / low trans fat, no added salt, caffeine restricted, Low sodium diet-   No more than 2,000mg (2 grams) of salt / sodium per day (which equals to a little less than  a teaspoon of salt)  If your doctor wants you on a fluid restriction. ..it is usually recommended a fluid limit of 2,000cc - Fluid restriction- 2,000 ml (milliliters) = 64 ounces = you can have 8 glasses of fluid per day (each glass 8 ounces)    Follow a low salt diet - avoid using salt at the table, avoid / limit use of canned soups, processed / packaged foods, salted snacks, olives and pickles. Do not use a salt substitute without checking with your doctor, they may contain a high amount of potassioum. (Mrs. Zuleika Lindsay is safe to use). Limit the use of alcohol       CALL YOUR DOCTOR THE FIRST DAY YOU NOTICE ANY OF THESE   SYMPTOMS:  You have a weight gain of 3 pounds or more in 1 day         OR 5 pounds or more in one week  More shortness of breath  More swelling of your stomach, legs, ankles or feet  Feeling more tired, No energy  Dry hacky cough  Dizziness  More chest pain / discomfort       (CALL 911 IF ANY OF THE FOLLOWING OCCURS  Chest pain (not relieved with nitroglycerine, if you have been prescribed this medication)  Severe shortness of breath  Faint / Pass out  Confusion / cannot think clearly  If symptoms get worse           SMOKING - TOBACCO USE:  * IF YOU SMOKE - STOP! Kick the habit. [address] Program is offered at [place] [id number] and [address] [facility]. Call [phone] extension [facility] for more information. ACTIVITY:   (Ask your doctor when you will be able to return to work and before starting any exercise program.  Do not drive unless unless your doctor has given you permission to do so). Start light exercise. Even if you can only do a small amount, exercise will help you get stronger, have more energy, help manage your weight and decrease  stress. Walking is an easy way to get exercise. Start out slowly and  increase the amount you walk as tolerated  If you become short of breath, dizzy or have chest pain; stop, sit down, and rest.  If you feel \"wiped out\" the day after you exercise, walk at a slower pace or for a shorter distance.  You can gradually increase the pace or amount of time. (Do not exercise right after a meal or in extreme temperatures, such as above 85 degrees, if the air is really humid, or wind chill is less than 20 degrees)                                             ADDITIONAL INFORMATION:  Avoid getting sick from colds and the flu. Stay away from friends or family that you know may have a contagious illness  Get plenty of rest   Get a flu shot each year. Get a pneumococcal vaccine shot. If you have had one before, ask your doctor whether you need another dose. My Goal for Self-management of Heart Failure Includes 5 steps :    1. Notice a change in symptoms ( weight gain, short of breath, leg swelling, decreased activity level, bloating. ...)    2. Evaluate the change: (use the Heart Failure Zones )     3. Decide to take action: decide what your options are, such as: (call your doctor for an extra visit, take a prescribed medication, such as your water pill if your doctor has given you directions to do so, [address])    4. Come up with a strategy:  (now you call the doctor for advice / appointment. This is where you take action!!! Do not wait, catch the symptom early and treat it before it worsens. 5. Evaluate the response: The next day, check your Heart Failure Zones: are you in the GREEN ZONE (safe zone)? Worsening symptoms of YELLOW ZONE? Or have you moved to the RED ZONE and need to call 911 or go to the Emergency Room for evaluation? Call your doctor's office to update them on your symptoms of heart failure. Internal medicine    Follow ups  Please follow up with the internal medicine clinic at [facility] appointment has been scheduled for 11/9/2022 at 9 AM.   Please keep all other follow up appointments:   [facility] [place] on 12/02/2022 at 1 PM  [facility] CHF clinic on 11/17/2022 at 9:30 AM    Changes in healthcare   Please take all medications as indicated above  Diet: regular diet with low sodium (2gm)   Activity: activity as tolerated  Additional labs, testing or imaging needed after discharge   None  New medication prescribed after this hospitalization are   Coreg 12.5 mg BID  Statin 20 mg daily  Lisinopril 20 mg daily  Vitamin D once weekly for 7 doses  Combivent 1 puff every 6hrs   Please refer to your Med list for details   Please contact us if you have any concerns, wish to change or make an appointment:  Internal medicine clinic   Phone: [phone]  Fax: [phone]  [facility] [address]  Or please call the nurse line at [phone]. Should you have further questions in regards to this visit, you can review your clinical note and after visit summary document on your [employer] account. Other questions can be directed to our nurse line at [phone]. Other than any new prescriptions given to you today, the list of home medications on this After Visit Summary are based on information provided to us from you and your healthcare providers. This information, including the list, dose, and frequency of medications is only as accurate as the information you provided. If you have any questions or concerns about your home medications, please contact your Primary Care Physician for further clarification.           Zack Zimmer,   PGY 1   3:14 PM 11/2/2022

## 2022-11-03 ENCOUNTER — TELEPHONE (OUTPATIENT)
Dept: INTERNAL MEDICINE | Age: 57
End: 2022-11-03

## 2022-11-04 NOTE — TELEPHONE ENCOUNTER
Spoke to patient who states that her fmla papers need to be filled out per her place of employment ,[employer]. patient has her hfu appt on 11/09/2022  advised that these papers can be filled out by the doctors that saw her in the hospital states that some were partially filled out in hospital bu t they need completed   Also her inhaler is too high of a co-pay for her (103 dollars) for one inhaler and she is to use it 4 times a day  her daughter who was present stated that the only other way to have the ipratropium is with a nebulizer machine and pt states that she did not want that  she states that she never received her albuterol inhaler  advised that this was sent to the hospital pharmacy and she will need to call there and have them transfer it to another pharmacy of her choice  pt agreeable office fax number given for human resources  to fax her fmla forms to the office

## 2022-11-08 ENCOUNTER — APPOINTMENT (OUTPATIENT)
Dept: GENERAL RADIOLOGY | Age: 57
End: 2022-11-08
Payer: COMMERCIAL

## 2022-11-08 LAB
ALBUMIN SERPL-MCNC: 3.7 G/DL (ref 3.5–5.2)
ALP BLD-CCNC: 89 U/L (ref 35–104)
ALT SERPL-CCNC: 15 U/L (ref 0–32)
ANION GAP SERPL CALCULATED.3IONS-SCNC: 11 MMOL/L (ref 7–16)
AST SERPL-CCNC: 17 U/L (ref 0–31)
BASOPHILS ABSOLUTE: 0.03 E9/L (ref 0–0.2)
BASOPHILS RELATIVE PERCENT: 0.4 % (ref 0–2)
BILIRUB SERPL-MCNC: 0.4 MG/DL (ref 0–1.2)
BUN BLDV-MCNC: 10 MG/DL (ref 6–20)
CALCIUM SERPL-MCNC: 9.5 MG/DL (ref 8.6–10.2)
CHLORIDE BLD-SCNC: 106 MMOL/L (ref 98–107)
CO2: 21 MMOL/L (ref 22–29)
CREAT SERPL-MCNC: 0.9 MG/DL (ref 0.5–1)
EOSINOPHILS ABSOLUTE: 0.15 E9/L (ref 0.05–0.5)
EOSINOPHILS RELATIVE PERCENT: 2 % (ref 0–6)
GFR SERPL CREATININE-BSD FRML MDRD: >60 ML/MIN/1.73
GLUCOSE BLD-MCNC: 131 MG/DL (ref 74–99)
HCT VFR BLD CALC: 34.5 % (ref 34–48)
HEMOGLOBIN: 11.6 G/DL (ref 11.5–15.5)
IMMATURE GRANULOCYTES #: 0.03 E9/L
IMMATURE GRANULOCYTES %: 0.4 % (ref 0–5)
LACTIC ACID: 1.1 MMOL/L (ref 0.5–2.2)
LIPASE: 45 U/L (ref 13–60)
LYMPHOCYTES ABSOLUTE: 1.28 E9/L (ref 1.5–4)
LYMPHOCYTES RELATIVE PERCENT: 17 % (ref 20–42)
MAGNESIUM: 2.1 MG/DL (ref 1.6–2.6)
MCH RBC QN AUTO: 29.9 PG (ref 26–35)
MCHC RBC AUTO-ENTMCNC: 33.6 % (ref 32–34.5)
MCV RBC AUTO: 88.9 FL (ref 80–99.9)
MONOCYTES ABSOLUTE: 0.53 E9/L (ref 0.1–0.95)
MONOCYTES RELATIVE PERCENT: 7 % (ref 2–12)
NEUTROPHILS ABSOLUTE: 5.52 E9/L (ref 1.8–7.3)
NEUTROPHILS RELATIVE PERCENT: 73.2 % (ref 43–80)
PDW BLD-RTO: 12.4 FL (ref 11.5–15)
PLATELET # BLD: 213 E9/L (ref 130–450)
PMV BLD AUTO: 11.3 FL (ref 7–12)
POTASSIUM SERPL-SCNC: 3.8 MMOL/L (ref 3.5–5)
PRO-BNP: 1997 PG/ML (ref 0–125)
RBC # BLD: 3.88 E12/L (ref 3.5–5.5)
SODIUM BLD-SCNC: 138 MMOL/L (ref 132–146)
TOTAL PROTEIN: 7 G/DL (ref 6.4–8.3)
TROPONIN, HIGH SENSITIVITY: 11 NG/L (ref 0–9)
WBC # BLD: 7.5 E9/L (ref 4.5–11.5)

## 2022-11-08 PROCEDURE — 93005 ELECTROCARDIOGRAM TRACING: CPT | Performed by: NURSE PRACTITIONER

## 2022-11-08 PROCEDURE — 83880 ASSAY OF NATRIURETIC PEPTIDE: CPT

## 2022-11-08 PROCEDURE — 96372 THER/PROPH/DIAG INJ SC/IM: CPT

## 2022-11-08 PROCEDURE — 99285 EMERGENCY DEPT VISIT HI MDM: CPT

## 2022-11-08 PROCEDURE — 96366 THER/PROPH/DIAG IV INF ADDON: CPT

## 2022-11-08 PROCEDURE — 80053 COMPREHEN METABOLIC PANEL: CPT

## 2022-11-08 PROCEDURE — 85025 COMPLETE CBC W/AUTO DIFF WBC: CPT

## 2022-11-08 PROCEDURE — 83605 ASSAY OF LACTIC ACID: CPT

## 2022-11-08 PROCEDURE — 71045 X-RAY EXAM CHEST 1 VIEW: CPT

## 2022-11-08 PROCEDURE — 84484 ASSAY OF TROPONIN QUANT: CPT

## 2022-11-08 PROCEDURE — 83690 ASSAY OF LIPASE: CPT

## 2022-11-08 PROCEDURE — 96375 TX/PRO/DX INJ NEW DRUG ADDON: CPT

## 2022-11-08 PROCEDURE — 96365 THER/PROPH/DIAG IV INF INIT: CPT

## 2022-11-08 PROCEDURE — 83735 ASSAY OF MAGNESIUM: CPT

## 2022-11-08 ASSESSMENT — LIFESTYLE VARIABLES
HOW MANY STANDARD DRINKS CONTAINING ALCOHOL DO YOU HAVE ON A TYPICAL DAY: PATIENT DOES NOT DRINK
HOW OFTEN DO YOU HAVE A DRINK CONTAINING ALCOHOL: NEVER

## 2022-11-08 ASSESSMENT — PAIN SCALES - GENERAL: PAINLEVEL_OUTOF10: 7

## 2022-11-08 ASSESSMENT — PAIN DESCRIPTION - PAIN TYPE: TYPE: ACUTE PAIN

## 2022-11-08 ASSESSMENT — PAIN DESCRIPTION - LOCATION: LOCATION: ABDOMEN

## 2022-11-08 ASSESSMENT — PAIN - FUNCTIONAL ASSESSMENT: PAIN_FUNCTIONAL_ASSESSMENT: 0-10

## 2022-11-08 ASSESSMENT — PAIN DESCRIPTION - ORIENTATION: ORIENTATION: RIGHT

## 2022-11-08 ASSESSMENT — PAIN DESCRIPTION - DESCRIPTORS: DESCRIPTORS: ACHING;CRAMPING;DISCOMFORT

## 2022-11-09 ENCOUNTER — APPOINTMENT (OUTPATIENT)
Dept: CT IMAGING | Age: 57
End: 2022-11-09
Payer: COMMERCIAL

## 2022-11-09 ENCOUNTER — HOSPITAL ENCOUNTER (EMERGENCY)
Age: 57
Discharge: HOME | End: 2022-11-09
Attending: STUDENT IN AN ORGANIZED HEALTH CARE EDUCATION/TRAINING PROGRAM
Payer: COMMERCIAL

## 2022-11-09 ENCOUNTER — OFFICE VISIT (OUTPATIENT)
Dept: INTERNAL MEDICINE | Age: 57
End: 2022-11-09

## 2022-11-09 ENCOUNTER — TELEPHONE (OUTPATIENT)
Dept: OTHER | Facility: CLINIC | Age: 57
End: 2022-11-09

## 2022-11-09 VITALS
DIASTOLIC BLOOD PRESSURE: 91 MMHG | WEIGHT: 256.6 LBS | RESPIRATION RATE: 18 BRPM | TEMPERATURE: 98.3 F | HEART RATE: 73 BPM | BODY MASS INDEX: 42.7 KG/M2 | OXYGEN SATURATION: 95 % | SYSTOLIC BLOOD PRESSURE: 185 MMHG

## 2022-11-09 VITALS
SYSTOLIC BLOOD PRESSURE: 143 MMHG | HEIGHT: 65 IN | WEIGHT: 255.3 LBS | BODY MASS INDEX: 42.53 KG/M2 | OXYGEN SATURATION: 93 % | HEART RATE: 83 BPM | TEMPERATURE: 97.8 F | DIASTOLIC BLOOD PRESSURE: 68 MMHG | RESPIRATION RATE: 20 BRPM

## 2022-11-09 DIAGNOSIS — K52.9 ENTERITIS: Primary | ICD-10-CM

## 2022-11-09 DIAGNOSIS — R31.9 HEMATURIA, UNSPECIFIED TYPE: ICD-10-CM

## 2022-11-09 DIAGNOSIS — Z09 HOSPITAL DISCHARGE FOLLOW-UP: ICD-10-CM

## 2022-11-09 DIAGNOSIS — J45.909 UNCOMPLICATED ASTHMA, UNSPECIFIED ASTHMA SEVERITY, UNSPECIFIED WHETHER PERSISTENT: Primary | ICD-10-CM

## 2022-11-09 LAB
AMORPHOUS: ABNORMAL
BACTERIA: ABNORMAL /HPF
BILIRUBIN URINE: ABNORMAL
BLOOD, URINE: ABNORMAL
CLARITY: ABNORMAL
COLOR: ABNORMAL
EKG ATRIAL RATE: 71 BPM
EKG P AXIS: 68 DEGREES
EKG P-R INTERVAL: 172 MS
EKG Q-T INTERVAL: 410 MS
EKG QRS DURATION: 92 MS
EKG QTC CALCULATION (BAZETT): 445 MS
EKG R AXIS: 41 DEGREES
EKG T AXIS: 63 DEGREES
EKG VENTRICULAR RATE: 71 BPM
EPITHELIAL CELLS, UA: ABNORMAL /HPF
GLUCOSE URINE: NEGATIVE MG/DL
KETONES, URINE: NEGATIVE MG/DL
LEUKOCYTE ESTERASE, URINE: ABNORMAL
NITRITE, URINE: NEGATIVE
PH UA: 6 (ref 5–9)
PROTEIN UA: >=300 MG/DL
RBC UA: ABNORMAL /HPF (ref 0–2)
SPECIFIC GRAVITY UA: >=1.03 (ref 1–1.03)
TROPONIN, HIGH SENSITIVITY: 10 NG/L (ref 0–9)
UROBILINOGEN, URINE: 0.2 E.U./DL
WBC UA: ABNORMAL /HPF (ref 0–5)

## 2022-11-09 PROCEDURE — 6360000002 HC RX W HCPCS: Performed by: STUDENT IN AN ORGANIZED HEALTH CARE EDUCATION/TRAINING PROGRAM

## 2022-11-09 PROCEDURE — 81001 URINALYSIS AUTO W/SCOPE: CPT

## 2022-11-09 PROCEDURE — 6370000000 HC RX 637 (ALT 250 FOR IP): Performed by: STUDENT IN AN ORGANIZED HEALTH CARE EDUCATION/TRAINING PROGRAM

## 2022-11-09 PROCEDURE — 36415 COLL VENOUS BLD VENIPUNCTURE: CPT

## 2022-11-09 PROCEDURE — 74178 CT ABD&PLV WO CNTR FLWD CNTR: CPT

## 2022-11-09 PROCEDURE — A4216 STERILE WATER/SALINE, 10 ML: HCPCS | Performed by: STUDENT IN AN ORGANIZED HEALTH CARE EDUCATION/TRAINING PROGRAM

## 2022-11-09 PROCEDURE — 84484 ASSAY OF TROPONIN QUANT: CPT

## 2022-11-09 PROCEDURE — 2580000003 HC RX 258: Performed by: STUDENT IN AN ORGANIZED HEALTH CARE EDUCATION/TRAINING PROGRAM

## 2022-11-09 PROCEDURE — 2500000003 HC RX 250 WO HCPCS: Performed by: STUDENT IN AN ORGANIZED HEALTH CARE EDUCATION/TRAINING PROGRAM

## 2022-11-09 PROCEDURE — 93010 ELECTROCARDIOGRAM REPORT: CPT | Performed by: INTERNAL MEDICINE

## 2022-11-09 RX ORDER — DIPHENHYDRAMINE HYDROCHLORIDE 50 MG/ML
25 INJECTION INTRAMUSCULAR; INTRAVENOUS ONCE
Status: COMPLETED | OUTPATIENT
Start: 2022-11-09 | End: 2022-11-09

## 2022-11-09 RX ORDER — ONDANSETRON 2 MG/ML
4 INJECTION INTRAMUSCULAR; INTRAVENOUS EVERY 6 HOURS PRN
Status: DISCONTINUED | OUTPATIENT
Start: 2022-11-09 | End: 2022-11-09 | Stop reason: HOSPADM

## 2022-11-09 RX ORDER — PROCHLORPERAZINE MALEATE 10 MG
10 TABLET ORAL EVERY 6 HOURS PRN
Qty: 28 TABLET | Refills: 0 | Status: SHIPPED | OUTPATIENT
Start: 2022-11-09 | End: 2022-11-17

## 2022-11-09 RX ORDER — ALBUTEROL SULFATE 90 UG/1
2 AEROSOL, METERED RESPIRATORY (INHALATION) 4 TIMES DAILY PRN
Qty: 18 G | Refills: 0 | Status: SHIPPED
Start: 2022-11-09 | End: 2022-12-01

## 2022-11-09 RX ORDER — METHYLPREDNISOLONE SODIUM SUCCINATE 125 MG/2ML
125 INJECTION, POWDER, LYOPHILIZED, FOR SOLUTION INTRAMUSCULAR; INTRAVENOUS ONCE
Status: COMPLETED | OUTPATIENT
Start: 2022-11-09 | End: 2022-11-09

## 2022-11-09 RX ORDER — METRONIDAZOLE 500 MG/1
500 TABLET ORAL 2 TIMES DAILY
Qty: 14 TABLET | Refills: 0 | Status: SHIPPED | OUTPATIENT
Start: 2022-11-09 | End: 2022-11-16

## 2022-11-09 RX ORDER — METRONIDAZOLE 500 MG/100ML
500 INJECTION, SOLUTION INTRAVENOUS ONCE
Status: COMPLETED | OUTPATIENT
Start: 2022-11-09 | End: 2022-11-09

## 2022-11-09 RX ORDER — CEFDINIR 300 MG/1
300 CAPSULE ORAL 2 TIMES DAILY
Qty: 14 CAPSULE | Refills: 0 | Status: SHIPPED | OUTPATIENT
Start: 2022-11-09 | End: 2022-11-16

## 2022-11-09 RX ORDER — PROCHLORPERAZINE EDISYLATE 5 MG/ML
10 INJECTION INTRAMUSCULAR; INTRAVENOUS ONCE
Status: COMPLETED | OUTPATIENT
Start: 2022-11-09 | End: 2022-11-09

## 2022-11-09 RX ADMIN — FAMOTIDINE 20 MG: 10 INJECTION, SOLUTION INTRAVENOUS at 02:47

## 2022-11-09 RX ADMIN — CEFTRIAXONE 2000 MG: 2 INJECTION, POWDER, FOR SOLUTION INTRAMUSCULAR; INTRAVENOUS at 03:49

## 2022-11-09 RX ADMIN — METHYLPREDNISOLONE SODIUM SUCCINATE 125 MG: 125 INJECTION, POWDER, FOR SOLUTION INTRAMUSCULAR; INTRAVENOUS at 02:48

## 2022-11-09 RX ADMIN — DIPHENHYDRAMINE HYDROCHLORIDE 25 MG: 50 INJECTION, SOLUTION INTRAMUSCULAR; INTRAVENOUS at 02:48

## 2022-11-09 RX ADMIN — ONDANSETRON 4 MG: 2 INJECTION INTRAMUSCULAR; INTRAVENOUS at 02:47

## 2022-11-09 RX ADMIN — TRIMETHOBENZAMIDE HYDROCHLORIDE 200 MG: 100 INJECTION INTRAMUSCULAR at 04:55

## 2022-11-09 RX ADMIN — PROCHLORPERAZINE EDISYLATE 10 MG: 5 INJECTION INTRAMUSCULAR; INTRAVENOUS at 03:49

## 2022-11-09 RX ADMIN — ALUMINUM HYDROXIDE, MAGNESIUM HYDROXIDE, AND SIMETHICONE: 200; 200; 20 SUSPENSION ORAL at 03:49

## 2022-11-09 RX ADMIN — METRONIDAZOLE 500 MG: 500 INJECTION, SOLUTION INTRAVENOUS at 03:48

## 2022-11-09 ASSESSMENT — ENCOUNTER SYMPTOMS
WHEEZING: 0
CONSTIPATION: 0
CHEST TIGHTNESS: 0
SHORTNESS OF BREATH: 1
ABDOMINAL PAIN: 1
COUGH: 1
EYE REDNESS: 0
SHORTNESS OF BREATH: 0
CONSTIPATION: 1
ABDOMINAL PAIN: 1
NAUSEA: 1
BACK PAIN: 0
TROUBLE SWALLOWING: 0
RHINORRHEA: 0
BACK PAIN: 1
SORE THROAT: 0
EYE PAIN: 0
WHEEZING: 0
BLOOD IN STOOL: 0
SORE THROAT: 0
DIARRHEA: 0
ABDOMINAL DISTENTION: 0
COUGH: 0
COLOR CHANGE: 0
EYE DISCHARGE: 0
VOMITING: 1
DIARRHEA: 0
SINUS PRESSURE: 0
NAUSEA: 1
VOMITING: 1

## 2022-11-09 NOTE — TELEPHONE ENCOUNTER
Writer contacted ED provider to inform of 30 day readmission risk. Writer's attempt to contact ED provider was unsuccessful.       Call Back: If you need to call back to inform of disposition you can contact me at [phone]

## 2022-11-09 NOTE — LETTER
[facility] Internal Medicine  [address]  [place] [address]  Phone: [phone]  Fax: [phone]    Yonatan Sloan MD        November 9, 2022     Patient: Radames Palomino   YOB: 1965   Date of Visit: 11/9/2022       To Whom It May Concern: It is my medical opinion that Jamie Cintron may return to light duty immediately with the following restrictions: avoid excessive walking or standing after follow up visit with her heart doctor. The patient was admitted to the hospital on 10/30/2022 to 11/02/2022 and since then she is on rest. Patient needs to be on rest at home till her heart doctor follow up clinic visit. She is scheduled for that on 11/17/2022. If you have any questions or concerns, please don't hesitate to call.     Sincerely,        Yonatan Sloan MD

## 2022-11-09 NOTE — ED PROVIDER NOTES
Department of Emergency Medicine   ED  Provider Note  Admit Date/RoomTime: 11/9/2022  1:24 AM  ED Room: [address] [place] [address]          History of Present Illness:  11/9/22, Time: 2:08 AM EST  Chief Complaint   Patient presents with    Abdominal Pain     Patient experiencing RLQ abdominal pain, bloating, emesis. +N/VRaghavendra Laurent is a 59 y.o. female presenting to the ED for abdominal pain, beginning yesterday. The complaint has been constant, severe in severity, and worsened by nothing. Patient describes it as a cramping sensation in her upper abdomen. Patient states that she is never had this previously. Nothing makes it better nor worse. She states that she does have some nausea and vomiting that accompanies it. Her emesis has consisted of stomach contents. Patient states that it does go around to the back. She otherwise denies any fevers, chest pain, shortness of breath, dysuria, diarrhea, vaginal bleeding, or vaginal discharge. She states that she does have some constipation. Review of Systems   Constitutional:  Negative for chills and fever. HENT:  Negative for ear pain, sinus pressure and sore throat. Eyes:  Negative for pain, discharge and redness. Respiratory:  Negative for cough, shortness of breath and wheezing. Cardiovascular:  Negative for chest pain. Gastrointestinal:  Positive for abdominal pain, constipation, nausea and vomiting. Negative for abdominal distention and diarrhea. Genitourinary:  Negative for dysuria, frequency, vaginal bleeding and vaginal discharge. Musculoskeletal:  Positive for back pain. Negative for arthralgias. Skin:  Negative for rash and wound. Neurological:  Negative for weakness and headaches. Hematological:  Negative for adenopathy.    All other systems reviewed and are negative.       --------------------------------------------- PAST HISTORY ---------------------------------------------  Past Medical History:  has a past medical history of Abnormal carotid pulse, Asthma, Depression, Dyslipidemia, Hypertension, Mitral valve prolapse, Obesity, and Osteoarthritis. Past Surgical History:  has a past surgical history that includes Hysterectomy (); knee surgery; Tonsillectomy (childhood);  section; Incisional hernia repair (12); and Abdomen surgery. Social History:  reports that she has never smoked. She has never used smokeless tobacco. She reports current alcohol use. She reports that she does not use drugs. Family History: family history includes Arthritis in her mother and sister; Asthma in her mother and sister; Cancer in her father; Diabetes in her father, mother, and sister; Heart Disease in her mother; High Blood Pressure in her father, mother, sister, sister, sister, and sister; Mental Illness in her father and sister; Stroke in her father and mother. . Unless otherwise noted, family history is non contributory    The patients home medications have been reviewed. Allergies: Doxycycline, Iodine, Toradol [ketorolac tromethamine], and Iodides        ---------------------------------------------------PHYSICAL EXAM--------------------------------------    Physical Exam  Vitals and nursing note reviewed. Constitutional:       General: She is not in acute distress. Appearance: She is well-developed. She is obese. HENT:      Head: Normocephalic and atraumatic. Eyes:      Conjunctiva/sclera: Conjunctivae normal.   Cardiovascular:      Rate and Rhythm: Normal rate and regular rhythm. Heart sounds: Normal heart sounds. No murmur heard. Pulmonary:      Effort: Pulmonary effort is normal. No respiratory distress. Breath sounds: Normal breath sounds. No wheezing or rales. Abdominal:      General: There is no distension. Palpations: Abdomen is soft. Tenderness: There is abdominal tenderness in the epigastric area. There is no right CVA tenderness, left CVA tenderness, guarding or rebound. Musculoskeletal:      Cervical back: Normal range of motion and neck supple. Skin:     General: Skin is warm and dry. Neurological:      Mental Status: She is alert and oriented to person, place, and time. Cranial Nerves: No cranial nerve deficit. Coordination: Coordination normal.          -------------------------------------------------- RESULTS -------------------------------------------------  I have personally reviewed all laboratory and imaging results for this patient. Results are listed below.      LABS: (Lab results interpreted by me)  Results for orders placed or performed during the hospital encounter of 11/09/22   Troponin   Result Value Ref Range    Troponin, High Sensitivity 11 (H) 0 - 9 ng/L   CBC with Auto Differential   Result Value Ref Range    WBC 7.5 4.5 - 11.5 E9/L    RBC 3.88 3.50 - 5.50 E12/L    Hemoglobin 11.6 11.5 - 15.5 g/dL    Hematocrit 34.5 34.0 - 48.0 %    MCV 88.9 80.0 - 99.9 fL    MCH 29.9 26.0 - 35.0 pg    MCHC 33.6 32.0 - 34.5 %    RDW 12.4 11.5 - 15.0 fL    Platelets 842 074 - 312 E9/L    MPV 11.3 7.0 - 12.0 fL    Neutrophils % 73.2 43.0 - 80.0 %    Immature Granulocytes % 0.4 0.0 - 5.0 %    Lymphocytes % 17.0 (L) 20.0 - 42.0 %    Monocytes % 7.0 2.0 - 12.0 %    Eosinophils % 2.0 0.0 - 6.0 %    Basophils % 0.4 0.0 - 2.0 %    Neutrophils Absolute 5.52 1.80 - 7.30 E9/L    Immature Granulocytes # 0.03 E9/L    Lymphocytes Absolute 1.28 (L) 1.50 - 4.00 E9/L    Monocytes Absolute 0.53 0.10 - 0.95 E9/L    Eosinophils Absolute 0.15 0.05 - 0.50 E9/L    Basophils Absolute 0.03 0.00 - 0.20 E9/L   Comprehensive Metabolic Panel   Result Value Ref Range    Sodium 138 132 - 146 mmol/L    Potassium 3.8 3.5 - 5.0 mmol/L    Chloride 106 98 - 107 mmol/L    CO2 21 (L) 22 - 29 mmol/L    Anion Gap 11 7 - 16 mmol/L    Glucose 131 (H) 74 - 99 mg/dL    BUN 10 6 - 20 mg/dL    Creatinine 0.9 0.5 - 1.0 mg/dL    Est, Glom Filt Rate >60 >=60 mL/min/1.73    Calcium 9.5 8.6 - 10.2 mg/dL    Total Protein 7.0 6.4 - 8.3 g/dL    Albumin 3.7 3.5 - 5.2 g/dL    Total Bilirubin 0.4 0.0 - 1.2 mg/dL    Alkaline Phosphatase 89 35 - 104 U/L    ALT 15 0 - 32 U/L    AST 17 0 - 31 U/L   Lactic Acid   Result Value Ref Range    Lactic Acid 1.1 0.5 - 2.2 mmol/L   Lipase   Result Value Ref Range    Lipase 45 13 - 60 U/L   Urinalysis   Result Value Ref Range    Color, UA DARK YELLOW (A) Straw/Yellow    Clarity, UA SL CLOUDY Clear    Glucose, Ur Negative Negative mg/dL    Bilirubin Urine SMALL (A) Negative    Ketones, Urine Negative Negative mg/dL    Specific Gravity, UA >=1.030 1.005 - 1.030    Blood, Urine MODERATE (A) Negative    pH, UA 6.0 5.0 - 9.0    Protein, UA >=300 (A) Negative mg/dL    Urobilinogen, Urine 0.2 <2.0 E.U./dL    Nitrite, Urine Negative Negative    Leukocyte Esterase, Urine TRACE (A) Negative   Brain Natriuretic Peptide   Result Value Ref Range    Pro-BNP 1,997 (H) 0 - 125 pg/mL   Magnesium   Result Value Ref Range    Magnesium 2.1 1.6 - 2.6 mg/dL   Troponin   Result Value Ref Range    Troponin, High Sensitivity 10 (H) 0 - 9 ng/L   Microscopic Urinalysis   Result Value Ref Range    WBC, UA 1-3 0 - 5 /HPF    RBC, UA 5-10 (A) 0 - 2 /HPF    Epithelial Cells, UA MANY /HPF    Bacteria, UA MANY (A) None Seen /HPF    Amorphous, UA MANY    EKG 12 Lead   Result Value Ref Range    Ventricular Rate 71 BPM    Atrial Rate 71 BPM    P-R Interval 172 ms    QRS Duration 92 ms    Q-T Interval 410 ms    QTc Calculation (Bazett) 445 ms    P Axis 68 degrees    R Axis 41 degrees    T Axis 63 degrees   ,       RADIOLOGY:  Interpreted by Radiologist unless otherwise specified  CT ABDOMEN PELVIS W WO CONTRAST Additional Contrast? None   Final Result   Left hemicolon diverticulosis without evidence of diverticulitis. Several   segments of small bowel demonstrating moderate diffuse wall thickening   suggestive of underlying enteritis. Small volume ascites. Postsurgical changes related to ventral hernia repair.          XR CHEST PORTABLE   Final Result   No acute disease. RECOMMENDATION:   Careful clinical correlation and follow up recommended. ------------------------- NURSING NOTES AND VITALS REVIEWED ---------------------------   The nursing notes within the ED encounter and vital signs as below have been reviewed by myself  BP (!) 185/91   Pulse 73   Temp 98.3 °F (36.8 °C) (Oral)   Resp 18   Wt 256 lb 9.6 oz (116.4 kg)   SpO2 95%   BMI 42.70 kg/m²     Oxygen Saturation Interpretation: Normal      The patients available past medical records and past encounters were reviewed. ------------------------------ ED COURSE/MEDICAL DECISION MAKING----------------------  Medications   ondansetron (ZOFRAN) injection 4 mg (4 mg IntraVENous Given 11/9/22 0247)   aluminum & magnesium hydroxide-simethicone (MAALOX) 30 mL, lidocaine viscous hcl (XYLOCAINE) 5 mL (GI COCKTAIL) ( Oral Given 11/9/22 0349)   famotidine (PEPCID) 20 mg in sodium chloride (PF) 0.9 % 10 mL injection (20 mg IntraVENous Given 11/9/22 0247)   methylPREDNISolone sodium (SOLU-MEDROL) injection 125 mg (125 mg IntraVENous Given 11/9/22 0248)   diphenhydrAMINE (BENADRYL) injection 25 mg (25 mg IntraVENous Given 11/9/22 0248)   cefTRIAXone (ROCEPHIN) 2,000 mg in sterile water 20 mL IV syringe (2,000 mg IntraVENous Given 11/9/22 0349)   metronidazole (FLAGYL) 500 mg in 0.9% NaCl 100 mL IVPB premix (500 mg IntraVENous New Bag 11/9/22 0348)   prochlorperazine (COMPAZINE) injection 10 mg (10 mg IntraVENous Given 11/9/22 0349)   trimethobenzamide (TIGAN) injection 200 mg (200 mg IntraMUSCular Given 11/9/22 0455)            Medical Decision Making:     ED Course as of 11/09/22 0527 Wed Nov 09, 2022   5363 Patient presents with abdominal pain, nausea and vomiting. Zofran given for nausea. GI cocktail ordered for pain however patient threw it up. CBC did not show any leukocytosis. CMP was largely unremarkable. Lipase and lactate WNL.  UA did show hematuria but was not convincing for UTI. Troponins with non significant delta. CT of the abdomen pelvis was obtained which showed concern for enteritis. Given patient's continued emesis, patient was started on ceftriaxone and flagyl. Patient was also given compazine. Patient continued to have emesis. Tigan given with resolution in her symptoms. Patient will be discharged with a prescription for compazine, omnicef, and flagyl and advised to follow with her PCP for further evaluation and care. She was also given referral to GI.  [BB]   1462 Patient reevaluated. She states she threw up again after the compazine. [BB]   6905 Patient reevaluated. She states her nausea has resolved. She states she feels good for discharge. [BB]   2359 EKG shows normal sinus rhythm with ventricular rate of 71 bpm.  Normal axis. Does not meet STEMI criteria. Comparable to previous EKG on 10/30/2022. As interpreted by myself ED physician. [BB]      ED Course User Index  [BB] Liberty Briseno DO        Re-Evaluations:  Patient was reevaluted and was improved. This patient's ED course included: a personal history and physicial examination, multiple bedside re-evaluations, IV medications, and cardiac monitoring    This patient has remained hemodynamically stable during their ED course. Consultations:  None      Critical Care: None       Counseling: The emergency provider has spoken with the patient and discussed todays results, in addition to providing specific details for the plan of care and counseling regarding the diagnosis and prognosis. Questions are answered at this time and they are agreeable with the plan.       --------------------------------- IMPRESSION AND DISPOSITION ---------------------------------    IMPRESSION  1. Enteritis    2.  Hematuria, unspecified type        DISPOSITION  Disposition: Discharge to home  Patient condition is stable    Patient was seen and evaluated by both myself and Raquel Nguyen Tyrone Lucero MD.      NOTE: This report was transcribed using voice recognition software.  Every effort was made to ensure accuracy; however, inadvertent computerized transcription errors may be present           Hilary Gonzalez,   Resident  11/09/22 1 [facility],   Resident  11/09/22 [id number]

## 2022-11-09 NOTE — PROGRESS NOTES
[facility]    Attending Physician Statement:  Mayra Moon M.D., F.A.C.P. I have discussed the case, including pertinent history and exam findings with the resident. I agree with the assessment, plan and orders as documented by the resident. Patient is seen for fu visit today. Last office notes reviewed, relative labs and imaging. Health maintenance issues of vaccinations, depression screening, tobacco cessation etc... covered    Hospital fu visit  7-14 days, moderate complexity  Flash pulm edema  HTN emergency- echo stage 2 diastolic heart  Non ischemic cardiomyopathy  exac by HTN emergency  And +influenza B  Fu chf clinic  (Ischemic workup planned by cardio in future if need)    Copd- combivent inhaler ?too much money-- not taking  +MOORE - NOT Wheezes- with exertion  More likely deconditioning  Inc bmi, ordered GRACE testing in past, -- too $    Then back in ER last night  WBC N  Cough worsening  +productive (post infectious cough- flu B)  No fevers  Gastroenteritis +n/v -- viral vs toxigenic likely  Doubt bacterial \"enteritis\"  Small bowel-- \"  Several segments of small bowel demonstrating moderate diffuse wall   thickening suggestive of underlying enteritis   \"  She was Rx abx from ER though- though she didn't start  OK for cefdinir- bc ongoing productive cough, but defer flagyl - doubt bacterial small bowel enteritis>  Fu urien  UA some blood +protein-- wbc 1-3  30min  Remainder of medical problems as per resident note.

## 2022-11-09 NOTE — ED NOTES
Department of Anesthesiology  Preprocedure Note       Name:  Heidy Her   Age:  46 y.o.  :  1971                                          MRN:  [id number]         Date:  2020      Surgeon: Colin Calabrese):  Blair Perkins MD    Procedure: Procedure(s):  COLORECTAL CANCER SCREENING, NOT HIGH RISK    Medications prior to admission:   Prior to Admission medications    Medication Sig Start Date End Date Taking? Authorizing Provider   vitamin C (ASCORBIC ACID) 500 MG tablet Take 1,000 mg by mouth daily Ld 2020    Historical Provider, MD   ibuprofen (ADVIL;MOTRIN) 200 MG tablet Take 200 mg by mouth every 6 hours as needed for Pain     Historical Provider, MD   diphenhydrAMINE (BENADRYL) 25 MG tablet Take 12.5 mg by mouth nightly as needed for Sleep     Historical Provider, MD   loratadine (CLARITIN) 10 MG tablet Take 10 mg by mouth daily    Historical Provider, MD   MULTIPLE VITAMIN PO Take by mouth daily Ld     Historical Provider, MD       Current medications:    Current Facility-Administered Medications   Medication Dose Route Frequency Provider Last Rate Last Dose    0.9 % sodium chloride infusion   Intravenous Continuous Blair Perkins MD           Allergies:     Allergies   Allergen Reactions    Lactose Diarrhea    Sulfa Antibiotics Other (See Comments)     Does not recall specific reaction       Problem List:    Patient Active Problem List   Diagnosis Code    Shoulder impingement syndrome, right M75.41       Past Medical History:        Diagnosis Date    Decreased right shoulder range of motion     Encounter for screening colonoscopy     PONV (postoperative nausea and vomiting)     nausea       Past Surgical History:        Procedure Laterality Date    DILATION AND CURETTAGE      HYSTERECTOMY  2019    Dr. Amaury Mccarty ARTHROSCOPY Right 2019    ARTHROSCOPIC SUBACROMIAL DECOMPRESSION RIGHT SHOULDER performed by Camilla Skaggs Department of Emergency Medicine  FIRST PROVIDER TRIAGE NOTE             Independent MLP           11/8/22  10:33 PM EST    Date of Encounter: 11/8/22   MRN: [id number]      HPI: Scott Kwok is a 59 y.o. female who presents to the ED for Abdominal Pain (Patient experiencing RLQ abdominal pain, bloating, emesis. +N/V. )     Patient with recent admission to the hospital diagnosed with congestive heart failure is well as influenza. Patient reports now having right lower quadrant abdominal pain feeling very bloated and now having nausea and vomiting. ROS: Negative for cp or sob. PE: Gen Appearance/Constitutional: alert  GI: tender to palpation     Initial Plan of Care: All treatment areas with department are currently occupied. Plan to order/Initiate the following while awaiting opening in ED: labs, EKG, and imaging studies.   Initiate Treatment-Testing, Proceed toTreatment Area When Bed Available for ED Attending/MLP to Continue Care    Electronically signed by DENI Demarco CNP   DD: 11/8/22       DENI Demarco CNP  11/08/22 [id number]

## 2022-11-09 NOTE — PATIENT INSTRUCTIONS
Thank you for coming to your follow up appointment   Please take your medications as directed and keep your follow up appointment in 12/14/2022. Call our office if you have any questions or concerns at [phone]  Follow up with your CHF clinic and cardiology appointment. Have your urine analysis done prior to the next visit.       Yoni Moreno MD

## 2022-11-17 ENCOUNTER — HOSPITAL ENCOUNTER (OUTPATIENT)
Dept: OTHER | Age: 57
Setting detail: THERAPIES SERIES
Discharge: HOME | End: 2022-11-17
Payer: COMMERCIAL

## 2022-11-17 VITALS
DIASTOLIC BLOOD PRESSURE: 82 MMHG | BODY MASS INDEX: 42.77 KG/M2 | RESPIRATION RATE: 18 BRPM | SYSTOLIC BLOOD PRESSURE: 158 MMHG | HEART RATE: 80 BPM | OXYGEN SATURATION: 96 % | WEIGHT: 257 LBS

## 2022-11-17 LAB
ANION GAP SERPL CALCULATED.3IONS-SCNC: 8 MMOL/L (ref 7–16)
BUN BLDV-MCNC: 12 MG/DL (ref 6–20)
CALCIUM SERPL-MCNC: 9.4 MG/DL (ref 8.6–10.2)
CHLORIDE BLD-SCNC: 109 MMOL/L (ref 98–107)
CO2: 24 MMOL/L (ref 22–29)
CREAT SERPL-MCNC: 0.9 MG/DL (ref 0.5–1)
GFR SERPL CREATININE-BSD FRML MDRD: >60 ML/MIN/1.73
GLUCOSE BLD-MCNC: 113 MG/DL (ref 74–99)
POTASSIUM SERPL-SCNC: 3.6 MMOL/L (ref 3.5–5)
PRO-BNP: 2168 PG/ML (ref 0–125)
SODIUM BLD-SCNC: 141 MMOL/L (ref 132–146)

## 2022-11-17 PROCEDURE — 80048 BASIC METABOLIC PNL TOTAL CA: CPT

## 2022-11-17 PROCEDURE — 36415 COLL VENOUS BLD VENIPUNCTURE: CPT

## 2022-11-17 PROCEDURE — 83880 ASSAY OF NATRIURETIC PEPTIDE: CPT

## 2022-11-17 PROCEDURE — 99204 OFFICE O/P NEW MOD 45 MIN: CPT

## 2022-11-17 ASSESSMENT — EJECTION FRACTION: EF_VALUE: 40-45

## 2022-11-17 NOTE — PROGRESS NOTES
Congestive Heart Failure [address] [facility]   1965          Referring Provider: Smith Shen  Primary Care Physician: Lynn Medina  Cardiologist: Fany Vásquez  Nephrologist: N/A        History of Present Illness:     Mac Nicolas is a 59 y.o. female with a history of HFrEF, most recent EF 40-45% 11/1/2022. Patient Story:    She does  have dyspnea with exertion, shortness of breath, or decline in overall functional capacity. She does have orthopnea, PND, nocturnal cough or hemoptysis. She does have abdominal distention or bloating, early satiety, anorexia/change in appetite. She is not on an oral diuretic. She does not have  lower extremity edema. She denies lightheadedness, dizziness. She denies palpitations, syncope or near syncope. She does not complain of chest pain, pressure, discomfort. Allergies   Allergen Reactions    Doxycycline Swelling and Other (See Comments)    Iodine Hives     IV dye    Toradol [Ketorolac Tromethamine] Anaphylaxis    Iodides Hives         No outpatient medications have been marked as taking for the 11/17/22 encounter [facility] Encounter) with [facility] ROOM 1.      Current Outpatient Medications on File Prior to Encounter   Medication Sig Dispense Refill    albuterol sulfate HFA (VENTOLIN HFA) 108 (90 Base) MCG/ACT inhaler Inhale 2 puffs into the lungs 4 times daily as needed for Wheezing 18 g 0    albuterol sulfate HFA (PROVENTIL;VENTOLIN;PROAIR) 108 (90 Base) MCG/ACT inhaler Inhale 2 puffs into the lungs every 6 hours as needed for Wheezing or Shortness of Breath 1 each 6    cetirizine (ZYRTEC) 10 MG tablet Take 1 tablet by mouth daily as needed for Allergies 30 tablet 4    rosuvastatin (CRESTOR) 20 MG tablet Take 1 tablet by mouth nightly 30 tablet 2    lisinopril (PRINIVIL;ZESTRIL) 20 MG tablet Take 1 tablet by mouth daily 30 tablet 2    carvedilol (COREG) 12.5 MG tablet Take 1 tablet by mouth 2 times daily (with meals) 60 tablet 2    Vitamin D, Ergocalciferol, 54110 units CAPS Take 50,000 Units by mouth once a week for 8 doses 6 capsule 0    albuterol-ipratropium (COMBIVENT RESPIMAT)  MCG/ACT AERS inhaler Inhale 1 puff into the lungs every 6 hours (Patient not taking: No sig reported) 1 each 2    ibuprofen (IBU) 800 MG tablet Take 1 tablet by mouth every 8 hours as needed for Pain 21 tablet 0    Acetaminophen 650 MG TABS Take 500 mg by mouth every 6 hours as needed for Pain 20 tablet 0    fluticasone (FLONASE) 50 MCG/ACT nasal spray 1 spray by Nasal route daily 1 Bottle 4     No current facility-administered medications on file prior to encounter. Guideline directed medical:  ARNI/ACE I/ARB: Yes  Beta blocker:   Yes  Aldosterone antagonist:  No  SGLT2i:  No        Physical Examination:     BP (!) 158/82   Pulse 80   Resp 18   Wt 257 lb (116.6 kg)   SpO2 96%   BMI 42.77 kg/m²     Assessment  Charting Type: Shift assessment                   Respiratory  L Breath Sounds: Clear, Diminished  R Breath Sounds: Clear, Diminished              Cardiac  Cardiac Regularity: Regular  Heart Sounds: S1, S2  Cardiac Rhythm: Sinus rhythm    Rhythm Interpretation  Heart Rate: 80         Gastrointestinal  Abdominal (WDL): Within Defined Limits               Peripheral Vascular  RLE Edema: None  LLE Edema: None                   Genitourinary  Genitourinary (WDL): Within Defined Limits    Psychosocial  Psychosocial (WDL): Within Defined Limits                        Heart Rate: 80                     LAB DATA:    Last 3 BMP      Sodium (mmol/L)   Date Value   11/08/2022 138   11/02/2022 140   11/01/2022 143     Potassium (mmol/L)   Date Value   11/08/2022 3.8   11/02/2022 3.9   11/01/2022 3.5     Potassium reflex Magnesium (mmol/L)   Date Value   10/30/2022 3.8     Chloride (mmol/L)   Date Value   11/08/2022 106   11/02/2022 105   11/01/2022 107     CO2 (mmol/L)   Date Value   11/08/2022 21 (L)   11/02/2022 26   11/01/2022 24 BUN (mg/dL)   Date Value   11/08/2022 10   11/02/2022 15   11/01/2022 19     Glucose (mg/dL)   Date Value   11/08/2022 131 (H)   11/02/2022 90   11/01/2022 97   03/09/2012 91   10/05/2010 89     Calcium (mg/dL)   Date Value   11/08/2022 9.5   11/02/2022 9.1   11/01/2022 9.1       Last 3 BNP       Pro-BNP (pg/mL)   Date Value   11/08/2022 1,997 (H)   11/01/2022 913 (H)   10/30/2022 1,931 (H)          CBC: No results for input(s): WBC, HGB, PLT in the last 72 hours. BMP:  No results for input(s): NA, K, CL, CO2, BUN, CREATININE, GLUCOSE in the last 72 hours. Hepatic: No results for input(s): AST, ALT, ALB, BILITOT, ALKPHOS in the last 72 hours. Troponin: No results for input(s): TROPONINI in the last 72 hours. BNP: No results for input(s): BNP in the last 72 hours. Lipids: No results for input(s): CHOL, HDL in the last 72 hours. Invalid input(s): LDLCALCU  INR: No results for input(s): INR in the last 72 hours. WEIGHTS:    Wt Readings from Last 3 Encounters:   11/17/22 257 lb (116.6 kg)   11/09/22 255 lb 4.8 oz (115.8 kg)   11/08/22 256 lb 9.6 oz (116.4 kg)         TELEMETRY:  Cardiac Regularity: Regular  Cardiac Rhythm/Interpretation: NSR        ASSESSMENT:  Librado Scales first visit with a weight 257lb, patient is adjusting to a low sodium diet, and daily weights. No C/O of SOB at this visit.      Interventions completed this visit:  IV diuretics given no  Lab work obtained yes, BMP,BNP   Reviewed currently prescribed medications with patient, educated on importance of compliance and answered any questions regarding their medication  Educated on signs and symptoms of HF  Educated on low sodium diet    PLAN:  Scheduled to follow up in CHF clinic on   Future Appointments   Date Time Provider Yesica Bryant   11/23/2022  8:15 AM [facility] CHF ROOM 1 [facility] CHF  Rani   12/1/2022  1:00 PM DENI Saleh - CNP [facility] CARDIO [facility]   12/14/2022  9:30 AM Brantley Spatz, MD [facility] IM HP     Given clinic phone number and aware of signs and symptoms to call with any HF change in symptoms. First visit with CHF clinic today. Patient presented with daughter. Discussed with patient and daughter the purpose of CHF clinic and importance of daily weights and doing a self check every day to monitor for changes. Went over the three heart failure zones and to call cardiologist if in yellow zone immediately to prevent any further decline. Patient has a working scale. Patient is agreeable to future CHF clinic visits.

## 2022-11-23 ENCOUNTER — HOSPITAL ENCOUNTER (OUTPATIENT)
Dept: OTHER | Age: 57
Setting detail: THERAPIES SERIES
Discharge: HOME | End: 2022-11-23
Payer: COMMERCIAL

## 2022-11-23 VITALS
BODY MASS INDEX: 43.1 KG/M2 | HEART RATE: 71 BPM | WEIGHT: 259 LBS | OXYGEN SATURATION: 96 % | DIASTOLIC BLOOD PRESSURE: 78 MMHG | SYSTOLIC BLOOD PRESSURE: 170 MMHG | RESPIRATION RATE: 18 BRPM

## 2022-11-23 LAB
ANION GAP SERPL CALCULATED.3IONS-SCNC: 11 MMOL/L (ref 7–16)
BUN BLDV-MCNC: 15 MG/DL (ref 6–20)
CALCIUM SERPL-MCNC: 9 MG/DL (ref 8.6–10.2)
CHLORIDE BLD-SCNC: 112 MMOL/L (ref 98–107)
CO2: 23 MMOL/L (ref 22–29)
CREAT SERPL-MCNC: 0.8 MG/DL (ref 0.5–1)
GFR SERPL CREATININE-BSD FRML MDRD: >60 ML/MIN/1.73
GLUCOSE BLD-MCNC: 103 MG/DL (ref 74–99)
POTASSIUM SERPL-SCNC: 3.8 MMOL/L (ref 3.5–5)
PRO-BNP: 1669 PG/ML (ref 0–125)
SODIUM BLD-SCNC: 146 MMOL/L (ref 132–146)

## 2022-11-23 PROCEDURE — 99214 OFFICE O/P EST MOD 30 MIN: CPT

## 2022-11-23 PROCEDURE — 6360000002 HC RX W HCPCS: Performed by: INTERNAL MEDICINE

## 2022-11-23 PROCEDURE — 96374 THER/PROPH/DIAG INJ IV PUSH: CPT

## 2022-11-23 PROCEDURE — 2580000003 HC RX 258: Performed by: INTERNAL MEDICINE

## 2022-11-23 PROCEDURE — 36415 COLL VENOUS BLD VENIPUNCTURE: CPT

## 2022-11-23 PROCEDURE — 83880 ASSAY OF NATRIURETIC PEPTIDE: CPT

## 2022-11-23 PROCEDURE — 80048 BASIC METABOLIC PNL TOTAL CA: CPT

## 2022-11-23 RX ORDER — SODIUM CHLORIDE 0.9 % (FLUSH) 0.9 %
10 SYRINGE (ML) INJECTION ONCE
Status: COMPLETED | OUTPATIENT
Start: 2022-11-23 | End: 2022-11-23

## 2022-11-23 RX ORDER — FUROSEMIDE 10 MG/ML
40 INJECTION INTRAMUSCULAR; INTRAVENOUS ONCE
Status: COMPLETED | OUTPATIENT
Start: 2022-11-23 | End: 2022-11-23

## 2022-11-23 RX ADMIN — FUROSEMIDE 40 MG: 10 INJECTION, SOLUTION INTRAMUSCULAR; INTRAVENOUS at 08:21

## 2022-11-23 RX ADMIN — SODIUM CHLORIDE, PRESERVATIVE FREE 10 ML: 5 INJECTION INTRAVENOUS at 08:21

## 2022-11-23 NOTE — PROGRESS NOTES
Congestive Heart Failure [address] [facility]   1965          Referring Provider: Smith Shen  Primary Care Physician: Lynn Medina  Cardiologist: Fany Vásquez  Nephrologist: N/A        History of Present Illness:     Mac Nicolas is a 59 y.o. female with a history of HFrEF, most recent EF 40-45% 11/1/2022. Patient Story:    She does  have dyspnea with exertion, shortness of breath, or decline in overall functional capacity. She does have orthopnea, PND, nocturnal cough or hemoptysis. She does have abdominal distention or bloating, early satiety, anorexia/change in appetite. She is not on an oral diuretic. She does not have  lower extremity edema. She denies lightheadedness, dizziness. She denies palpitations, syncope or near syncope. She does not complain of chest pain, pressure, discomfort. Allergies   Allergen Reactions    Doxycycline Swelling and Other (See Comments)    Iodine Hives     IV dye    Toradol [Ketorolac Tromethamine] Anaphylaxis    Iodides Hives         No outpatient medications have been marked as taking for the 11/23/22 encounter [facility] Encounter) with [facility] ROOM 1.      Current Outpatient Medications on File Prior to Encounter   Medication Sig Dispense Refill    albuterol sulfate HFA (VENTOLIN HFA) 108 (90 Base) MCG/ACT inhaler Inhale 2 puffs into the lungs 4 times daily as needed for Wheezing 18 g 0    albuterol sulfate HFA (PROVENTIL;VENTOLIN;PROAIR) 108 (90 Base) MCG/ACT inhaler Inhale 2 puffs into the lungs every 6 hours as needed for Wheezing or Shortness of Breath 1 each 6    cetirizine (ZYRTEC) 10 MG tablet Take 1 tablet by mouth daily as needed for Allergies 30 tablet 4    rosuvastatin (CRESTOR) 20 MG tablet Take 1 tablet by mouth nightly 30 tablet 2    lisinopril (PRINIVIL;ZESTRIL) 20 MG tablet Take 1 tablet by mouth daily 30 tablet 2    carvedilol (COREG) 12.5 MG tablet Take 1 tablet by mouth 2 times daily (with meals) 60 tablet 2    Vitamin D, Ergocalciferol, 94369 units CAPS Take 50,000 Units by mouth once a week for 8 doses 6 capsule 0    albuterol-ipratropium (COMBIVENT RESPIMAT)  MCG/ACT AERS inhaler Inhale 1 puff into the lungs every 6 hours (Patient not taking: No sig reported) 1 each 2    ibuprofen (IBU) 800 MG tablet Take 1 tablet by mouth every 8 hours as needed for Pain 21 tablet 0    Acetaminophen 650 MG TABS Take 500 mg by mouth every 6 hours as needed for Pain 20 tablet 0    fluticasone (FLONASE) 50 MCG/ACT nasal spray 1 spray by Nasal route daily 1 Bottle 4     No current facility-administered medications on file prior to encounter. Guideline directed medical:  ARNI/ACE I/ARB: Yes  Beta blocker:   Yes  Aldosterone antagonist:  No  SGLT2i:  No        Physical Examination:     BP (!) 170/78   Pulse 71   Resp 18   Wt 259 lb (117.5 kg)   SpO2 96%   BMI 43.10 kg/m²     Assessment  Charting Type: Shift assessment                   Respiratory  L Breath Sounds: Clear, Diminished  R Breath Sounds: Clear, Diminished              Cardiac  Cardiac Regularity: Regular  Heart Sounds: S1, S2  Cardiac Rhythm: Sinus rhythm    Rhythm Interpretation  Heart Rate: 71         Gastrointestinal  Abdominal (WDL): Within Defined Limits               Peripheral Vascular  RLE Edema: Trace  LLE Edema: Trace                   Genitourinary  Genitourinary (WDL): Within Defined Limits    Psychosocial  Psychosocial (WDL): Within Defined Limits                        Heart Rate: 71                     LAB DATA:    Last 3 BMP      Sodium (mmol/L)   Date Value   11/17/2022 141   11/08/2022 138   11/02/2022 140     Potassium (mmol/L)   Date Value   11/17/2022 3.6   11/08/2022 3.8   11/02/2022 3.9     Potassium reflex Magnesium (mmol/L)   Date Value   10/30/2022 3.8     Chloride (mmol/L)   Date Value   11/17/2022 109 (H)   11/08/2022 106   11/02/2022 105     CO2 (mmol/L)   Date Value   11/17/2022 24   11/08/2022 21 (L)   11/02/2022 26     BUN (mg/dL)   Date Value   11/17/2022 12   11/08/2022 10   11/02/2022 15     Glucose (mg/dL)   Date Value   11/17/2022 113 (H)   11/08/2022 131 (H)   11/02/2022 90   03/09/2012 91   10/05/2010 89     Calcium (mg/dL)   Date Value   11/17/2022 9.4   11/08/2022 9.5   11/02/2022 9.1       Last 3 BNP       Pro-BNP (pg/mL)   Date Value   11/17/2022 2,168 (H)   11/08/2022 1,997 (H)   11/01/2022 913 (H)          CBC: No results for input(s): WBC, HGB, PLT in the last 72 hours. BMP:  No results for input(s): NA, K, CL, CO2, BUN, CREATININE, GLUCOSE in the last 72 hours. Hepatic: No results for input(s): AST, ALT, ALB, BILITOT, ALKPHOS in the last 72 hours. Troponin: No results for input(s): TROPONINI in the last 72 hours. BNP: No results for input(s): BNP in the last 72 hours. Lipids: No results for input(s): CHOL, HDL in the last 72 hours. Invalid input(s): LDLCALCU  INR: No results for input(s): INR in the last 72 hours. WEIGHTS:    Wt Readings from Last 3 Encounters:   11/23/22 259 lb (117.5 kg)   11/17/22 257 lb (116.6 kg)   11/09/22 255 lb 4.8 oz (115.8 kg)         TELEMETRY:  Cardiac Regularity: Regular  Cardiac Rhythm/Interpretation: NSR        ASSESSMENT:  Eduar Nielsen visit today with a weight of 259lb, patient is adjusting to a low sodium diet, and daily weights. No C/O of SOB at this visit, but does C/O of tight ankles and states she feels like she is holding fluid. Patient did not take her medications this morning and her Bp reflects this. Pt instructed to take her medications before coming to her appts.      Interventions completed this visit:  IV diuretics given no  Lab work obtained yes, BMP,BNP   Reviewed currently prescribed medications with patient, educated on importance of compliance and answered any questions regarding their medication  Educated on signs and symptoms of HF  Educated on low sodium diet    PLAN:  Scheduled to follow up in CHF clinic on   Future Appointments   Date Time Provider Yesica Bryant   12/1/2022  1:00 PM DENI Morales - CNP [facility]   12/2/2022  9:45 AM [facility] ROOM 1 [facility]  [place]   12/14/2022  9:30 AM Hong More MD [facility]     Given clinic phone number and aware of signs and symptoms to call with any HF change in symptoms.

## 2022-12-01 ENCOUNTER — OFFICE VISIT (OUTPATIENT)
Dept: CARDIOLOGY CLINIC | Age: 57
End: 2022-12-01
Payer: COMMERCIAL

## 2022-12-01 VITALS
HEIGHT: 65 IN | OXYGEN SATURATION: 97 % | SYSTOLIC BLOOD PRESSURE: 136 MMHG | RESPIRATION RATE: 18 BRPM | WEIGHT: 260.8 LBS | HEART RATE: 62 BPM | BODY MASS INDEX: 43.45 KG/M2 | DIASTOLIC BLOOD PRESSURE: 92 MMHG

## 2022-12-01 DIAGNOSIS — I50.20 SYSTOLIC CONGESTIVE HEART FAILURE, UNSPECIFIED HF CHRONICITY (HCC): Primary | ICD-10-CM

## 2022-12-01 DIAGNOSIS — D50.9 IRON DEFICIENCY ANEMIA, UNSPECIFIED IRON DEFICIENCY ANEMIA TYPE: ICD-10-CM

## 2022-12-01 DIAGNOSIS — G47.33 OSA (OBSTRUCTIVE SLEEP APNEA): ICD-10-CM

## 2022-12-01 PROCEDURE — 3017F COLORECTAL CA SCREEN DOC REV: CPT | Performed by: NURSE PRACTITIONER

## 2022-12-01 PROCEDURE — 3078F DIAST BP <80 MM HG: CPT | Performed by: NURSE PRACTITIONER

## 2022-12-01 PROCEDURE — G8482 FLU IMMUNIZE ORDER/ADMIN: HCPCS | Performed by: NURSE PRACTITIONER

## 2022-12-01 PROCEDURE — 99215 OFFICE O/P EST HI 40 MIN: CPT | Performed by: NURSE PRACTITIONER

## 2022-12-01 PROCEDURE — G8417 CALC BMI ABV UP PARAM F/U: HCPCS | Performed by: NURSE PRACTITIONER

## 2022-12-01 PROCEDURE — 1111F DSCHRG MED/CURRENT MED MERGE: CPT | Performed by: NURSE PRACTITIONER

## 2022-12-01 PROCEDURE — G8427 DOCREV CUR MEDS BY ELIG CLIN: HCPCS | Performed by: NURSE PRACTITIONER

## 2022-12-01 PROCEDURE — 3074F SYST BP LT 130 MM HG: CPT | Performed by: NURSE PRACTITIONER

## 2022-12-01 PROCEDURE — 1036F TOBACCO NON-USER: CPT | Performed by: NURSE PRACTITIONER

## 2022-12-01 RX ORDER — FUROSEMIDE 20 MG/1
20 TABLET ORAL DAILY
Qty: 90 TABLET | Refills: 1 | Status: SHIPPED | OUTPATIENT
Start: 2022-12-01

## 2022-12-01 NOTE — Clinical Note
Setting her up for the [facility] that you wanted done as OP once symptoms improved from influenza B infection. Thanks!

## 2022-12-01 NOTE — PATIENT INSTRUCTIONS
Start lasix 20 mg daily     2. Go to CHF clinic tomorrow as scheduled - will draw viral and iron studies    3. Refer for cardiac catheterization     4. Refer for sleep study    5. Follow up with Dr. Kiran Conroy in 1 month     6. Weigh yourself daily    -Stay Hydrated, 64 oz     -Diet should sodium restricted to 2 grams    -Again watch your daily weight trends and if you gain water weight please follow below instructions.    -If you gain 3-5 pounds in 2-3 days OR notice that you are retaining fluid in anyway just like you did before then take an extra dose of your water pill (furosemide/Lasix) every day until you lose the weight or feel better.     -If you notice that you have taken more than 2 extra doses in 1 week then please call and let us know. -If at any time you feel that you are retaining fluid, your medications are not working, or you feel ill in anyway, then please call us for either same day appointment or the next day, and for instructions. Our goal is to keep you out of the emergency room and the hospital and we have ways to do it. You just need to call us in a timely manner.     -If you become sick for other reasons, and notice that you are not urinating as much, the urine is very dark, you have significant diarrhea or vomiting, then please DO NOT take your water pill and CALL US immediately.

## 2022-12-01 NOTE — PROGRESS NOTES
[facility] Cardiology  Office Visit         Reason for Visit: Heart Failure    Primary Cardiologist: Dr. Eduardo Younger         History of Present Illness:     Ms. Fabian Webb is a 59year old female with a PMHx of chronic HFmrEF, poorly controlled HTN, HLD, asthma, morbid obesity, GERD and OA. She recently presented to the emergency room with complaints of increased shortness of breath, MOORE, orthopnea, PND, increased lower extremity edema and flulike symptoms. She was admitted to the hospital for acute heart failure, IV diuresed as well as treated for + influenza B.  TTE demonstrated newly diagnosed LV dysfunction, EF 40-45%, anterior and anterior lateral hypokinesis, moderate LVH, stage II DD, preserved RV size and function, mild TR. she was initiated on GDMT with plans for outpatient heart catheterization once improved from an influenza standpoint. She presents today in hospital follow-up, since discharge from the hospital she has been compliant with all of her current cardiac medications. She was not discharged on oral diuretic, since discharge has required 1 dose of IV furosemide out of the CHF clinic, she continues to have some MOORE as well as a persistent nonproductive cough. Otherwise she has had improvement in shortness of breath, orthopnea, PND and lower extremity edema. She denies any dizziness, lightheadedness, near-syncope, syncope, strokelike symptoms, chest pain, pressure, heaviness, palpitations, abdominal bloating, early satiety.       Patient Active Problem List    Diagnosis Date Noted    Heart failure ([facility] [address] 11/02/2022     Priority: Medium    Acute congestive heart failure, unspecified heart failure type ([address] 10/30/2022     Priority: Medium    Influenza B 10/30/2022     Priority: Medium    CHF (congestive heart failure), NYHA class I, acute on chronic, combined ([address] 10/30/2022     Priority: Medium    Abdominal pain, acute, bilateral lower quadrant 03/08/2015     Priority: Medium     Class: Acute Post-op pain 2014     Priority: Low    Incisional hernia 2014     Priority: Low    Family history of colon cancer 2014     Priority: Low    Shoulder pain, left 2010     Priority: Low    Hypertension 2010     Priority: Low    Asthma 2010     Priority: Low    Osteoarthritis 2010     Priority: Low    Hyperlipidemia 2010     Priority: Low    Depression 2010     Priority: Low    Allergic rhinitis 2010     Priority: Low    Abnormal carotid pulse 2017    Abdominal pain, right lower quadrant 2015    Diverticulitis 2015    Diverticulitis of colon 2015    Nausea without vomiting 2015       Past Medical History:   Diagnosis Date    Abnormal carotid pulse 3/30/2017    Asthma     Depression     Dyslipidemia     Hypertension     Mitral valve prolapse     Obesity     Osteoarthritis            Past Surgical History:   Procedure Laterality Date    ABDOMEN SURGERY       SECTION      HYSTERECTOMY (CERVIX STATUS UNKNOWN)      INCISIONAL HERNIA REPAIR  14    Dr. Robina Morse  childhood             Allergies   Allergen Reactions    Doxycycline Swelling and Other (See Comments)    Iodine Hives     IV dye    Toradol [Ketorolac Tromethamine] Anaphylaxis    Iodides Hives         Outpatient Medications Marked as Taking for the 22 encounter (Office Visit) with DENI Ellis CNP   Medication Sig Dispense Refill    albuterol sulfate HFA (PROVENTIL;VENTOLIN;PROAIR) 108 (90 Base) MCG/ACT inhaler Inhale 2 puffs into the lungs every 6 hours as needed for Wheezing or Shortness of Breath 1 each 6    cetirizine (ZYRTEC) 10 MG tablet Take 1 tablet by mouth daily as needed for Allergies 30 tablet 4    rosuvastatin (CRESTOR) 20 MG tablet Take 1 tablet by mouth nightly 30 tablet 2    lisinopril (PRINIVIL;ZESTRIL) 20 MG tablet Take 1 tablet by mouth daily 30 tablet 2    carvedilol (COREG) 12.5 MG tablet Take 1 tablet by mouth 2 times daily (with meals) 60 tablet 2    Vitamin D, Ergocalciferol, 88107 units CAPS Take 50,000 Units by mouth once a week for 8 doses 6 capsule 0    Acetaminophen 650 MG TABS Take 500 mg by mouth every 6 hours as needed for Pain 20 tablet 0           Guideline directed medical/device therapy:  ARNI/ACE I/ARB: Yes  Beta blocker: Yes  Aldosterone antagonist:  No  ICD/CRT-P/-D:   EF > 35%  QRS interval on recent ECG (personally reviewed/interpreted): <120 ms  Percentage RV pacing (personally reviewed/interpreted): %/NA        Review of Systems:   Cardiac: As per HPI  General: No fever, chills, rigors  Pulmonary: As per HPI  HEENT: No visual disturbances, difficult swallowing  GI: No nausea, vomiting, abdominal pain  : No dysuria or hematuria  Endocrine: No thyroid disease or diabetes  Musculoskeletal: SEGURA x 4, no focal motor deficits  Skin: Intact, no rashes  Neuro/Psych: No headache or seizures          Weights: Wt Readings from Last 3 Encounters:   12/01/22 260 lb 12.8 oz (118.3 kg)   11/23/22 259 lb (117.5 kg)   11/17/22 257 lb (116.6 kg)             Physical Examination:     BP (!) 136/92   Pulse 62   Resp 18   Ht 5' 5\" (1.651 m)   Wt 260 lb 12.8 oz (118.3 kg)   SpO2 97%   BMI 43.40 kg/m²     CONSTITUTIONAL: Alert and oriented times 3, no acute distress and cooperative to examination with proper mood and affect. SKIN: Skin color, texture, turgor normal. No rashes or lesions. LYMPH: no cervical nodes, no inguinal nodes  HEENT: Head is normocephalic, atraumatic. EOMI, PERRLA. NECK: Supple, symmetrical, trachea midline, no adenopathy, thyroid symmetric, not enlarged and no tenderness, skin normal.  CHEST/LUNGS: chest symmetric with normal A/P diameter, normal respiratory rate and rhythm, lungs diminished bases otherwise clear to auscultation without wheezes, rales or rhonchi. No accessory muscle use.  Scars None   CARDIOVASCULAR: Heart sounds are normal.  Regular rate and rhythm dilated left ventricle. Ejection fraction is visually estimated at 40-45%. Overall ejection fraction mild-to-moderately decreased . Anterior and anterolateral walls are hypokinetic. Moderate concentric left ventricular hypertrophy. Stage II diastolic dysfunction. Technically difficult examination. Definity echo contrast was used to   delineate endocardial borders. EKG  Sinus Rhythm   Left atrial enlargement      ASSESSMENT:  Acute on chronic HFmrEF  ACC stage C / NYHA class III  Mildly hypervolemic   Newly diagnosed cardiomyopathy, unknown etiology. Ischemia not ruled out also plausible hypertensive. Will also check viral studies. LVEF 45%, LVEDD 4.7, LVMI 119  HTN, hx of  being poorly controlled   HLD  Morbid obesity  Probable GRACE  Asthma  GERD  OA       PLAN:  Start lasix 20 mg daily     2. Go to CHF clinic tomorrow as scheduled - will draw viral and iron studies    3. Refer for cardiac catheterization (HF 7) (will need pre-medicated prior due to IVP dye allergy, staff made aware)    4. Refer for sleep study    5. Follow up with Dr. Daniel Aviles in 1 month     6. Weigh yourself daily    -Stay Hydrated, 64 oz     -Diet should sodium restricted to 2 grams    -Again watch your daily weight trends and if you gain water weight please follow below instructions.    -If you gain 3-5 pounds in 2-3 days OR notice that you are retaining fluid in anyway just like you did before then take an extra dose of your water pill (furosemide/Lasix) every day until you lose the weight or feel better.     -If you notice that you have taken more than 2 extra doses in 1 week then please call and let us know. -If at any time you feel that you are retaining fluid, your medications are not working, or you feel ill in anyway, then please call us for either same day appointment or the next day, and for instructions. Our goal is to keep you out of the emergency room and the hospital and we have ways to do it.  You just need to call

## 2022-12-02 ENCOUNTER — TELEPHONE (OUTPATIENT)
Dept: INTERNAL MEDICINE | Age: 57
End: 2022-12-02

## 2022-12-02 ENCOUNTER — HOSPITAL ENCOUNTER (OUTPATIENT)
Age: 57
Discharge: HOME | End: 2022-12-02
Payer: COMMERCIAL

## 2022-12-02 ENCOUNTER — HOSPITAL ENCOUNTER (OUTPATIENT)
Dept: OTHER | Age: 57
Setting detail: THERAPIES SERIES
Discharge: HOME | End: 2022-12-02
Payer: COMMERCIAL

## 2022-12-02 VITALS
WEIGHT: 259 LBS | HEART RATE: 82 BPM | OXYGEN SATURATION: 96 % | RESPIRATION RATE: 18 BRPM | DIASTOLIC BLOOD PRESSURE: 100 MMHG | BODY MASS INDEX: 43.1 KG/M2 | SYSTOLIC BLOOD PRESSURE: 156 MMHG

## 2022-12-02 DIAGNOSIS — I50.20 SYSTOLIC CONGESTIVE HEART FAILURE, UNSPECIFIED HF CHRONICITY (HCC): Primary | ICD-10-CM

## 2022-12-02 DIAGNOSIS — I50.43 CHF (CONGESTIVE HEART FAILURE), NYHA CLASS I, ACUTE ON CHRONIC, COMBINED (HCC): Primary | ICD-10-CM

## 2022-12-02 DIAGNOSIS — I50.20 SYSTOLIC CONGESTIVE HEART FAILURE, UNSPECIFIED HF CHRONICITY (HCC): ICD-10-CM

## 2022-12-02 DIAGNOSIS — D50.9 IRON DEFICIENCY ANEMIA, UNSPECIFIED IRON DEFICIENCY ANEMIA TYPE: ICD-10-CM

## 2022-12-02 DIAGNOSIS — I10 HYPERTENSION, UNSPECIFIED TYPE: ICD-10-CM

## 2022-12-02 LAB
ANION GAP SERPL CALCULATED.3IONS-SCNC: 10 MMOL/L (ref 7–16)
BUN BLDV-MCNC: 14 MG/DL (ref 6–20)
CALCIUM SERPL-MCNC: 9.7 MG/DL (ref 8.6–10.2)
CHLORIDE BLD-SCNC: 107 MMOL/L (ref 98–107)
CO2: 25 MMOL/L (ref 22–29)
CREAT SERPL-MCNC: 0.9 MG/DL (ref 0.5–1)
GFR SERPL CREATININE-BSD FRML MDRD: >60 ML/MIN/1.73
GLUCOSE BLD-MCNC: 99 MG/DL (ref 74–99)
POTASSIUM SERPL-SCNC: 3.9 MMOL/L (ref 3.5–5)
PRO-BNP: 1991 PG/ML (ref 0–125)
SODIUM BLD-SCNC: 142 MMOL/L (ref 132–146)

## 2022-12-02 PROCEDURE — 86645 CMV ANTIBODY IGM: CPT

## 2022-12-02 PROCEDURE — 80048 BASIC METABOLIC PNL TOTAL CA: CPT

## 2022-12-02 PROCEDURE — 86665 EPSTEIN-BARR CAPSID VCA: CPT

## 2022-12-02 PROCEDURE — 36415 COLL VENOUS BLD VENIPUNCTURE: CPT

## 2022-12-02 PROCEDURE — 84166 PROTEIN E-PHORESIS/URINE/CSF: CPT

## 2022-12-02 PROCEDURE — 83540 ASSAY OF IRON: CPT

## 2022-12-02 PROCEDURE — 86658 ENTEROVIRUS ANTIBODY: CPT

## 2022-12-02 PROCEDURE — 83550 IRON BINDING TEST: CPT

## 2022-12-02 PROCEDURE — 83880 ASSAY OF NATRIURETIC PEPTIDE: CPT

## 2022-12-02 PROCEDURE — 86747 PARVOVIRUS ANTIBODY: CPT

## 2022-12-02 PROCEDURE — 84466 ASSAY OF TRANSFERRIN: CPT

## 2022-12-02 PROCEDURE — 82728 ASSAY OF FERRITIN: CPT

## 2022-12-02 PROCEDURE — 86644 CMV ANTIBODY: CPT

## 2022-12-02 PROCEDURE — 85610 PROTHROMBIN TIME: CPT | Performed by: NURSE PRACTITIONER

## 2022-12-02 PROCEDURE — 99214 OFFICE O/P EST MOD 30 MIN: CPT

## 2022-12-02 RX ORDER — PREDNISONE 50 MG/1
50 TABLET ORAL SEE ADMIN INSTRUCTIONS
COMMUNITY
End: 2022-12-03 | Stop reason: SDUPTHER

## 2022-12-02 RX ORDER — DIPHENHYDRAMINE HCL 50 MG
50 CAPSULE ORAL SEE ADMIN INSTRUCTIONS
COMMUNITY
End: 2022-12-03 | Stop reason: SDUPTHER

## 2022-12-02 NOTE — TELEPHONE ENCOUNTER
Reviewed with patient over phone and required work [employer] papers completed.   Electronically signed by Ayde Zaman DO on 12/2/2022 at 1:37 PM

## 2022-12-02 NOTE — PROGRESS NOTES
Congestive Heart Failure [address] [facility]   1965          Referring Provider: Amanda Calle  Primary Care Physician: Stacie Marie  Cardiologist: Arlin Barthel  Nephrologist: N/A        History of Present Illness:     Loulou Ha is a 59 y.o. female with a history of HFrEF, most recent EF 40-45% 11/1/2022. Patient Story:    She does  have dyspnea with exertion, shortness of breath, or decline in overall functional capacity. She does have orthopnea, PND, nocturnal cough or hemoptysis. She does have abdominal distention or bloating, early satiety, anorexia/change in appetite. She is not on an oral diuretic. She does not have  lower extremity edema. She denies lightheadedness, dizziness. She denies palpitations, syncope or near syncope. She does not complain of chest pain, pressure, discomfort. Allergies   Allergen Reactions    Doxycycline Swelling and Other (See Comments)    Iodine Hives     IV dye    Toradol [Ketorolac Tromethamine] Anaphylaxis    Iodides Hives         No outpatient medications have been marked as taking for the 12/2/22 encounter [facility] Encounter) with [facility] ROOM 1.      Current Outpatient Medications on File Prior to Encounter   Medication Sig Dispense Refill    furosemide (LASIX) 20 MG tablet Take 1 tablet by mouth daily 90 tablet 1    albuterol sulfate HFA (PROVENTIL;VENTOLIN;PROAIR) 108 (90 Base) MCG/ACT inhaler Inhale 2 puffs into the lungs every 6 hours as needed for Wheezing or Shortness of Breath 1 each 6    cetirizine (ZYRTEC) 10 MG tablet Take 1 tablet by mouth daily as needed for Allergies 30 tablet 4    rosuvastatin (CRESTOR) 20 MG tablet Take 1 tablet by mouth nightly 30 tablet 2    lisinopril (PRINIVIL;ZESTRIL) 20 MG tablet Take 1 tablet by mouth daily 30 tablet 2    carvedilol (COREG) 12.5 MG tablet Take 1 tablet by mouth 2 times daily (with meals) 60 tablet 2    Vitamin D, Ergocalciferol, 37436 units CAPS Take 50,000 Units by mouth once a week for 8 doses 6 capsule 0    Acetaminophen 650 MG TABS Take 500 mg by mouth every 6 hours as needed for Pain 20 tablet 0     No current facility-administered medications on file prior to encounter. Guideline directed medical:  ARNI/ACE I/ARB: Yes  Beta blocker:   Yes  Aldosterone antagonist:  No  SGLT2i:  No        Physical Examination:     BP (!) 156/100   Pulse 82   Resp 18   Wt 259 lb (117.5 kg)   SpO2 96%   BMI 43.10 kg/m²     Assessment  Charting Type: Shift assessment                   Respiratory  L Breath Sounds: Clear, Diminished  R Breath Sounds: Clear, Diminished              Cardiac  Cardiac Regularity: Regular  Heart Sounds: S1, S2  Cardiac Rhythm: Sinus rhythm    Rhythm Interpretation  Heart Rate: 82         Gastrointestinal  Abdominal (WDL): Within Defined Limits               Peripheral Vascular  RLE Edema: Trace  LLE Edema: Trace                   Genitourinary  Genitourinary (WDL): Within Defined Limits    Psychosocial  Psychosocial (WDL): Within Defined Limits                        Heart Rate: 82                     LAB DATA:    Last 3 BMP      Sodium (mmol/L)   Date Value   11/23/2022 146   11/17/2022 141   11/08/2022 138     Potassium (mmol/L)   Date Value   11/23/2022 3.8   11/17/2022 3.6   11/08/2022 3.8     Potassium reflex Magnesium (mmol/L)   Date Value   10/30/2022 3.8     Chloride (mmol/L)   Date Value   11/23/2022 112 (H)   11/17/2022 109 (H)   11/08/2022 106     CO2 (mmol/L)   Date Value   11/23/2022 23   11/17/2022 24   11/08/2022 21 (L)     BUN (mg/dL)   Date Value   11/23/2022 15   11/17/2022 12   11/08/2022 10     Glucose (mg/dL)   Date Value   11/23/2022 103 (H)   11/17/2022 113 (H)   11/08/2022 131 (H)   03/09/2012 91   10/05/2010 89     Calcium (mg/dL)   Date Value   11/23/2022 9.0   11/17/2022 9.4   11/08/2022 9.5       Last 3 BNP       Pro-BNP (pg/mL)   Date Value   11/23/2022 1,669 (H)   11/17/2022 2,168 (H)   11/08/2022 1,997 (H) CBC: No results for input(s): WBC, HGB, PLT in the last 72 hours. BMP:  No results for input(s): NA, K, CL, CO2, BUN, CREATININE, GLUCOSE in the last 72 hours. Hepatic: No results for input(s): AST, ALT, ALB, BILITOT, ALKPHOS in the last 72 hours. Troponin: No results for input(s): TROPONINI in the last 72 hours. BNP: No results for input(s): BNP in the last 72 hours. Lipids: No results for input(s): CHOL, HDL in the last 72 hours. Invalid input(s): LDLCALCU  INR: No results for input(s): INR in the last 72 hours. WEIGHTS:    Wt Readings from Last 3 Encounters:   12/02/22 259 lb (117.5 kg)   12/01/22 260 lb 12.8 oz (118.3 kg)   11/23/22 259 lb (117.5 kg)         TELEMETRY:  Cardiac Regularity: Regular  Cardiac Rhythm/Interpretation: NSR        ASSESSMENT:  Sarina Souza visit today with a weight of 259lb, same as last appt. No C/O of SOB at this visit. Patient did take her medications this morning just prior to her appt, and her Bp reflects this. Interventions completed this visit:  IV diuretics given no  Lab work obtained yes, BMP,BNP   Reviewed currently prescribed medications with patient, educated on importance of compliance and answered any questions regarding their medication  Educated on signs and symptoms of HF  Educated on low sodium diet    PLAN:  Scheduled to follow up in CHF clinic on   Future Appointments   Date Time Provider Yesica Bryant   12/7/2022  7:30 AM [facility] CVL 01 [facility] CATH [facility]   12/12/2022 10:15 AM [facility] CHF ROOM 1 [facility] CHF [facility]   12/14/2022  9:30 AM Phineas Schilder, MD [facility]     Given clinic phone number and aware of signs and symptoms to call with any HF change in symptoms.

## 2022-12-02 NOTE — PLAN OF CARE
Problem: Chronic Conditions and Co-morbidities  Goal: Patient's chronic conditions and co-morbidity symptoms are monitored and maintained or improved  Flowsheets (Taken 12/2/2022 8953)  Care Plan - Patient's Chronic Conditions and Co-Morbidity Symptoms are Monitored and Maintained or Improved: Monitor and assess patient's chronic conditions and comorbid symptoms for stability, deterioration, or improvement

## 2022-12-03 LAB
FERRITIN: 150 NG/ML
IRON SATURATION: 27 % (ref 15–50)
IRON: 70 MCG/DL (ref 37–145)
TOTAL IRON BINDING CAPACITY: 255 MCG/DL (ref 250–450)
TRANSFERRIN: 215 MG/DL (ref 200–360)

## 2022-12-03 RX ORDER — DIPHENHYDRAMINE HCL 50 MG
50 CAPSULE ORAL SEE ADMIN INSTRUCTIONS
Qty: 1 CAPSULE | Refills: 0 | Status: SHIPPED | OUTPATIENT
Start: 2022-12-03

## 2022-12-03 RX ORDER — PREDNISONE 50 MG/1
50 TABLET ORAL SEE ADMIN INSTRUCTIONS
Qty: 3 TABLET | Refills: 3 | Status: SHIPPED | OUTPATIENT
Start: 2022-12-03

## 2022-12-06 ENCOUNTER — HOSPITAL ENCOUNTER (OUTPATIENT)
Age: 57
Discharge: HOME | End: 2022-12-06
Payer: COMMERCIAL

## 2022-12-06 ENCOUNTER — TELEPHONE (OUTPATIENT)
Dept: CARDIAC CATH/INVASIVE PROCEDURES | Age: 57
End: 2022-12-06

## 2022-12-06 ENCOUNTER — TELEPHONE (OUTPATIENT)
Dept: CARDIOLOGY CLINIC | Age: 57
End: 2022-12-06

## 2022-12-06 DIAGNOSIS — I50.43 CHF (CONGESTIVE HEART FAILURE), NYHA CLASS I, ACUTE ON CHRONIC, COMBINED (HCC): ICD-10-CM

## 2022-12-06 DIAGNOSIS — Z79.899 NEW MEDICATION ADDED: ICD-10-CM

## 2022-12-06 DIAGNOSIS — I10 HYPERTENSION, UNSPECIFIED TYPE: ICD-10-CM

## 2022-12-06 DIAGNOSIS — I50.43 CHF (CONGESTIVE HEART FAILURE), NYHA CLASS I, ACUTE ON CHRONIC, COMBINED (HCC): Primary | ICD-10-CM

## 2022-12-06 LAB
ABO/RH: NORMAL
ANION GAP SERPL CALCULATED.3IONS-SCNC: 8 MMOL/L (ref 7–16)
ANTIBODY SCREEN: NORMAL
APTT: 28.8 SEC (ref 24.5–35.1)
BASOPHILS ABSOLUTE: 0.04 E9/L (ref 0–0.2)
BASOPHILS RELATIVE PERCENT: 0.9 % (ref 0–2)
BUN BLDV-MCNC: 16 MG/DL (ref 6–20)
CALCIUM SERPL-MCNC: 9.7 MG/DL (ref 8.6–10.2)
CHLORIDE BLD-SCNC: 109 MMOL/L (ref 98–107)
CO2: 23 MMOL/L (ref 22–29)
CREAT SERPL-MCNC: 1 MG/DL (ref 0.5–1)
EOSINOPHILS ABSOLUTE: 0.19 E9/L (ref 0.05–0.5)
EOSINOPHILS RELATIVE PERCENT: 4.4 % (ref 0–6)
GFR SERPL CREATININE-BSD FRML MDRD: >60 ML/MIN/1.73
GLUCOSE BLD-MCNC: 91 MG/DL (ref 74–99)
HCT VFR BLD CALC: 32.3 % (ref 34–48)
HEMOGLOBIN: 10.4 G/DL (ref 11.5–15.5)
IMMATURE GRANULOCYTES #: 0.02 E9/L
IMMATURE GRANULOCYTES %: 0.5 % (ref 0–5)
INR BLD: 1
LYMPHOCYTES ABSOLUTE: 2.19 E9/L (ref 1.5–4)
LYMPHOCYTES RELATIVE PERCENT: 50.6 % (ref 20–42)
MCH RBC QN AUTO: 28.4 PG (ref 26–35)
MCHC RBC AUTO-ENTMCNC: 32.2 % (ref 32–34.5)
MCV RBC AUTO: 88.3 FL (ref 80–99.9)
MONOCYTES ABSOLUTE: 0.37 E9/L (ref 0.1–0.95)
MONOCYTES RELATIVE PERCENT: 8.5 % (ref 2–12)
NEUTROPHILS ABSOLUTE: 1.52 E9/L (ref 1.8–7.3)
NEUTROPHILS RELATIVE PERCENT: 35.1 % (ref 43–80)
PDW BLD-RTO: 12.6 FL (ref 11.5–15)
PLATELET # BLD: 187 E9/L (ref 130–450)
PMV BLD AUTO: 11.6 FL (ref 7–12)
POTASSIUM SERPL-SCNC: 3.9 MMOL/L (ref 3.5–5)
PROTHROMBIN TIME: 11 SEC (ref 9.3–12.4)
RBC # BLD: 3.66 E12/L (ref 3.5–5.5)
SODIUM BLD-SCNC: 140 MMOL/L (ref 132–146)
WBC # BLD: 4.3 E9/L (ref 4.5–11.5)

## 2022-12-06 PROCEDURE — 85025 COMPLETE CBC W/AUTO DIFF WBC: CPT

## 2022-12-06 PROCEDURE — 86901 BLOOD TYPING SEROLOGIC RH(D): CPT

## 2022-12-06 PROCEDURE — 85610 PROTHROMBIN TIME: CPT

## 2022-12-06 PROCEDURE — 86900 BLOOD TYPING SEROLOGIC ABO: CPT

## 2022-12-06 PROCEDURE — 80048 BASIC METABOLIC PNL TOTAL CA: CPT

## 2022-12-06 PROCEDURE — 86850 RBC ANTIBODY SCREEN: CPT

## 2022-12-06 PROCEDURE — 36415 COLL VENOUS BLD VENIPUNCTURE: CPT

## 2022-12-06 PROCEDURE — 85730 THROMBOPLASTIN TIME PARTIAL: CPT

## 2022-12-06 NOTE — TELEPHONE ENCOUNTER
Reminded patient of scheduled procedure on 12/7 . Instructions given and COVID questionnaire completed.

## 2022-12-06 NOTE — TELEPHONE ENCOUNTER
----- Message from DENI Altman CNP sent at 12/6/2022 12:31 PM EST -----  Labs and CHF clinic note reviewed  Vitals at CHF clinic: BP (!) 156/100   Pulse 82    Current GDMT:  Carvedilol 12.5 mg BID  Lisinopril 20 mg daily   Lasix 20 mg daily       Please have her stop lisinopril   Let this wash out of her system for 36 hours  Start Entresto 49/51 mg BID  Follow up labs 1 week after starting Jojo Dowell     Thank you

## 2022-12-06 NOTE — RESULT ENCOUNTER NOTE
Labs and CHF clinic note reviewed  Vitals at CHF clinic: BP (!) 156/100   Pulse 82    Current GDMT:  Carvedilol 12.5 mg BID  Lisinopril 20 mg daily   Lasix 20 mg daily       Please have her stop lisinopril   Let this wash out of her system for 36 hours  Start Entresto 49/51 mg BID  Follow up labs 1 week after starting Entresto     Thank you

## 2022-12-06 NOTE — TELEPHONE ENCOUNTER
No PA generated for Entresto. Called [employer] with 30 day free trial card offer card  [employer] took info. Rx bin    pcn [facility]    grp  [id number]   id [id number]    Called patient with BOONE Almeida CNP instructions. After talking with patient. She is for heart cath tomorrow. Spoke with Eva Wolfe CNP, hold off on changes till after cath and CHF clinic visit will change Entresto then. She will continue lisinopril 20mg daily till seen at CHF clinic, she will not  Entresto. Intent to start Lollie Sciara 12/12/22. (Script is at [facility], and has lisinopril at home and will continue to use lisinopril and not  Entresto till instructed to). Continue to take lisinopril 20mg daily until told different and will not start Entresto. Future Appointments   Date Time Provider Yesica Bryant   12/7/2022  7:30 AM [facility] CVL 01 SEYZ CATH [facility]   12/12/2022 10:15 AM [facility] CHF ROOM 1 SEYZ [facility]F [facility]   12/14/2022  9:30 AM Lilian Law MD [facility]              Mailed 4 page Lollie Sciara education and how to sign up for copay card [facility].

## 2022-12-07 ENCOUNTER — HOSPITAL ENCOUNTER (OUTPATIENT)
Dept: CARDIAC CATH/INVASIVE PROCEDURES | Age: 57
Discharge: HOME | End: 2022-12-07
Payer: COMMERCIAL

## 2022-12-07 VITALS
HEART RATE: 67 BPM | DIASTOLIC BLOOD PRESSURE: 94 MMHG | HEIGHT: 65 IN | SYSTOLIC BLOOD PRESSURE: 184 MMHG | BODY MASS INDEX: 43.1 KG/M2 | OXYGEN SATURATION: 98 % | TEMPERATURE: 98 F

## 2022-12-07 PROCEDURE — C1894 INTRO/SHEATH, NON-LASER: HCPCS

## 2022-12-07 PROCEDURE — 2500000003 HC RX 250 WO HCPCS

## 2022-12-07 PROCEDURE — 2709999900 HC NON-CHARGEABLE SUPPLY

## 2022-12-07 PROCEDURE — 6370000000 HC RX 637 (ALT 250 FOR IP): Performed by: INTERNAL MEDICINE

## 2022-12-07 PROCEDURE — 6360000002 HC RX W HCPCS: Performed by: INTERNAL MEDICINE

## 2022-12-07 PROCEDURE — 93458 L HRT ARTERY/VENTRICLE ANGIO: CPT | Performed by: INTERNAL MEDICINE

## 2022-12-07 PROCEDURE — C1769 GUIDE WIRE: HCPCS

## 2022-12-07 PROCEDURE — C1887 CATHETER, GUIDING: HCPCS

## 2022-12-07 PROCEDURE — 93458 L HRT ARTERY/VENTRICLE ANGIO: CPT

## 2022-12-07 PROCEDURE — 6360000002 HC RX W HCPCS

## 2022-12-07 RX ORDER — SODIUM CHLORIDE 9 MG/ML
INJECTION, SOLUTION INTRAVENOUS PRN
OUTPATIENT
Start: 2022-12-07

## 2022-12-07 RX ORDER — SODIUM CHLORIDE 0.9 % (FLUSH) 0.9 %
5-40 SYRINGE (ML) INJECTION EVERY 12 HOURS SCHEDULED
OUTPATIENT
Start: 2022-12-07

## 2022-12-07 RX ORDER — CARVEDILOL 6.25 MG/1
12.5 TABLET ORAL ONCE
Status: COMPLETED | OUTPATIENT
Start: 2022-12-07 | End: 2022-12-07

## 2022-12-07 RX ORDER — ACETAMINOPHEN 325 MG/1
650 TABLET ORAL EVERY 4 HOURS PRN
OUTPATIENT
Start: 2022-12-07

## 2022-12-07 RX ORDER — HYDRALAZINE HYDROCHLORIDE 20 MG/ML
10 INJECTION INTRAMUSCULAR; INTRAVENOUS ONCE
Status: COMPLETED | OUTPATIENT
Start: 2022-12-07 | End: 2022-12-07

## 2022-12-07 RX ORDER — SODIUM CHLORIDE 9 MG/ML
INJECTION, SOLUTION INTRAVENOUS CONTINUOUS
Status: ACTIVE | OUTPATIENT
Start: 2022-12-07 | End: 2022-12-07

## 2022-12-07 RX ORDER — SODIUM CHLORIDE 0.9 % (FLUSH) 0.9 %
5-40 SYRINGE (ML) INJECTION PRN
OUTPATIENT
Start: 2022-12-07

## 2022-12-07 RX ADMIN — CARVEDILOL 12.5 MG: 6.25 TABLET, FILM COATED ORAL at 12:11

## 2022-12-07 RX ADMIN — HYDRALAZINE HYDROCHLORIDE 10 MG: 20 INJECTION INTRAMUSCULAR; INTRAVENOUS at 12:12

## 2022-12-07 NOTE — PROCEDURES
[address]                  [address] [place],  [facility]                            CARDIAC CATHETERIZATION    PATIENT NAME: Candelaria Hernandez                     :        1965  MED REC NO:   [id number]                            ROOM:  ACCOUNT NO:   [de-identified]                           ADMIT DATE: 2022  PROVIDER:     Nolan Escoto MD    DATE OF PROCEDURE:  2022    LEFT HEART CATHETERIZATION    INDICATION:  Acute on chronic systolic CHF with mild systolic  dysfunction and wall motion abnormalities noted on echocardiogram.    REFERRING PROVIDER:  Dr. Trung Hui. AUC indication 7. DESCRIPTION OF THE PROCEDURE:  After obtaining a signed consent from the  patient, she was brought to the cardiac cath lab in usual fasting state. The patient was premedicated since yesterday with steroids and Benadryl  due to allergic reaction to iodine. Under sterile condition and under  local anesthetic and using conscious sedation, a 6-Israeli Terumo Slender  sheath was inserted into her right radial artery. The patient received  5000 units of intravenous heparin. She also received 200 mcg of  intraarterial nitroglycerin and 2.5 mg of diluted verapamil via the  right radial sheath. Then a 5-Israeli TIG diagnostic catheter could not  be advanced over a J-tip wire to the ascending aorta due to very  angulated and elongated right subclavian artery. Then I was able to  advance the TIG catheter over a Wholey wire with difficulty. The left  main coronary artery was engaged and a coronary angiogram was done. This catheter failed to engage the right coronary artery. It went  accidentally into the left ventricle and the left ventricular  end-diastolic pressure was measured. No left ventriculogram was done  due to known ejection fraction by echocardiogram.  There was no  significant gradient across the aortic valve.   This catheter was  exchanged over a guidewire to a 5-German AR-1, 5-German Tera catheter  and finally a 5-German AL-1 catheter which was able to engage the right  coronary artery with difficulty. A coronary angiogram was done. At the  end of the procedure, the diagnostic catheter was pulled out. The  Terumo Slender sheath was pulled out and a Vasc Band was applied over  the right radial artery with good hemostasis. The patient tolerated the  procedure very well and no complications were noted. No significant  blood loss occurred during the procedure. Her blood pressure was  elevated and required three doses of intravenous hydralazine of 10 mg  each. FINDINGS:  HEMODYNAMICS:  Aortic pressure 190/107 mmHg. Left ventricular end-diastolic pressure 25 mmHg. CORONARY ANATOMY:  Left main:  It is a long and short artery with no angiographic stenosis  noted. LAD:  It is a large artery wrapping around the apex and giving rise to a  large bifurcating diagonal branch and several septal perforators. There  was 30-40% discrete mid LAD stenosis. Left circumflex: It is a large artery giving rise to a very small first  obtuse marginal branch, a large trifurcating second obtuse marginal  branch, then to a smaller third obtuse marginal branch. No angiographic  stenosis noted in the circumflex or its branches. Right coronary artery: It is medium in size giving rise to a conus  branch, two RV marginal branches, a small PDA and small PLV branch. There was 50% ostial discrete PDA stenosis. IMPRESSION:  1. Mild CAD. 2.  Elevated LVEDP at 25 mmHg. 3.  Uncontrolled blood pressure. RECOMMENDATIONS:  1. Control blood pressure. 2.  Aggressive medical therapy. PROCEDURE START TIME:  09:14 a.m. PROCEDURE END TIME:  09:53 a.m. CONTRAST VOLUME:  167 mL of Isovue. FLUOROSCOPY TIME:  15.6 minutes. CONSCIOUS SEDATION:  1 mg of intravenous Versed and 50 mcg of  intravenous fentanyl.         Arlene Barrow MD    D: 12/07/2022 10:11:01       T: 12/07/2022 10:14:35     [id number]  Job#: [id number]     Doc#: [id number]    CC:

## 2022-12-12 ENCOUNTER — HOSPITAL ENCOUNTER (OUTPATIENT)
Dept: OTHER | Age: 57
Setting detail: THERAPIES SERIES
Discharge: HOME | End: 2022-12-12
Payer: COMMERCIAL

## 2022-12-12 VITALS
BODY MASS INDEX: 42.6 KG/M2 | HEART RATE: 81 BPM | SYSTOLIC BLOOD PRESSURE: 150 MMHG | DIASTOLIC BLOOD PRESSURE: 69 MMHG | RESPIRATION RATE: 18 BRPM | OXYGEN SATURATION: 95 % | WEIGHT: 256 LBS

## 2022-12-12 LAB
ANION GAP SERPL CALCULATED.3IONS-SCNC: 8 MMOL/L (ref 7–16)
BUN BLDV-MCNC: 14 MG/DL (ref 6–20)
CALCIUM SERPL-MCNC: 9.7 MG/DL (ref 8.6–10.2)
CHLORIDE BLD-SCNC: 108 MMOL/L (ref 98–107)
CO2: 25 MMOL/L (ref 22–29)
CREAT SERPL-MCNC: 1 MG/DL (ref 0.5–1)
GFR SERPL CREATININE-BSD FRML MDRD: >60 ML/MIN/1.73
GLUCOSE BLD-MCNC: 84 MG/DL (ref 74–99)
POTASSIUM SERPL-SCNC: 3.9 MMOL/L (ref 3.5–5)
PRO-BNP: 1772 PG/ML (ref 0–125)
SODIUM BLD-SCNC: 141 MMOL/L (ref 132–146)

## 2022-12-12 PROCEDURE — 83880 ASSAY OF NATRIURETIC PEPTIDE: CPT

## 2022-12-12 PROCEDURE — 36415 COLL VENOUS BLD VENIPUNCTURE: CPT

## 2022-12-12 PROCEDURE — 80048 BASIC METABOLIC PNL TOTAL CA: CPT

## 2022-12-12 PROCEDURE — 99214 OFFICE O/P EST MOD 30 MIN: CPT

## 2022-12-12 NOTE — PROGRESS NOTES
Congestive Heart Failure [address] [facility]   1965          Referring Provider: Diego Koenig  Primary Care Physician: Janett Cushing  Cardiologist: Lindle Simmonds  Nephrologist: N/A        History of Present Illness:     Woodrow Ramirez is a 59 y.o. female with a history of HFrEF, most recent EF 40-45% 11/1/2022. Patient Story:    She does  have dyspnea with exertion, shortness of breath, or decline in overall functional capacity. She does have orthopnea, PND, nocturnal cough or hemoptysis. She does NOT have abdominal distention or bloating, early satiety, anorexia/change in appetite. She has a good response to her oral diuretic. She does not have  lower extremity edema. She admits to some lightheadedness, dizziness since starting Entresto today at 0700. She denies palpitations, syncope or near syncope. She does not complain of chest pain, pressure, discomfort. Admits that she may not be drinking 64 oz daily. I encouraged her to drink more. She is receptive to that. Allergies   Allergen Reactions    Doxycycline Swelling and Other (See Comments)    Iodine Hives     IV dye    Toradol [Ketorolac Tromethamine] Anaphylaxis    Iodides Hives         No outpatient medications have been marked as taking for the 12/12/22 encounter [facility] HOSPITAL Encounter) with [facility] ROOM 1.      Current Outpatient Medications on File Prior to Encounter   Medication Sig Dispense Refill    sacubitril-valsartan (ENTRESTO) 49-51 MG per tablet Take 1 tablet by mouth 2 times daily 60 tablet 3    furosemide (LASIX) 20 MG tablet Take 1 tablet by mouth daily 90 tablet 1    albuterol sulfate HFA (PROVENTIL;VENTOLIN;PROAIR) 108 (90 Base) MCG/ACT inhaler Inhale 2 puffs into the lungs every 6 hours as needed for Wheezing or Shortness of Breath 1 each 6    cetirizine (ZYRTEC) 10 MG tablet Take 1 tablet by mouth daily as needed for Allergies 30 tablet 4    rosuvastatin (CRESTOR) 20 MG tablet Take 1 tablet by mouth nightly 30 tablet 2    carvedilol (COREG) 12.5 MG tablet Take 1 tablet by mouth 2 times daily (with meals) 60 tablet 2    Vitamin D, Ergocalciferol, 29227 units CAPS Take 50,000 Units by mouth once a week for 8 doses 6 capsule 0    Acetaminophen 650 MG TABS Take 500 mg by mouth every 6 hours as needed for Pain 20 tablet 0     No current facility-administered medications on file prior to encounter. Guideline directed medical:  ARNI/ACE I/ARB: Yes  Beta blocker:   Yes  Aldosterone antagonist:  No  SGLT2i:  No        Physical Examination:     BP (!) 150/69   Pulse 81   Resp 18   Wt 256 lb (116.1 kg)   SpO2 95%   BMI 42.60 kg/m²     Assessment  Charting Type: Shift assessment                   Respiratory  L Breath Sounds: Clear, Diminished  R Breath Sounds: Clear, Diminished              Cardiac  Cardiac Regularity: Regular  Heart Sounds: S1, S2  Cardiac Rhythm: Sinus rhythm    Rhythm Interpretation  Heart Rate: 81         Gastrointestinal  Abdominal (WDL): Within Defined Limits               Peripheral Vascular  Peripheral Vascular (WDL): Within Defined Limits  RLE Edema: None  LLE Edema: None                   Genitourinary  Genitourinary (WDL): Within Defined Limits    Psychosocial  Psychosocial (WDL): Within Defined Limits                        Heart Rate: 81                     LAB DATA:    Last 3 BMP      Sodium (mmol/L)   Date Value   12/06/2022 140   12/02/2022 142   11/23/2022 146     Potassium (mmol/L)   Date Value   12/06/2022 3.9   12/02/2022 3.9   11/23/2022 3.8     Potassium reflex Magnesium (mmol/L)   Date Value   10/30/2022 3.8     Chloride (mmol/L)   Date Value   12/06/2022 109 (H)   12/02/2022 107   11/23/2022 112 (H)     CO2 (mmol/L)   Date Value   12/06/2022 23   12/02/2022 25   11/23/2022 23     BUN (mg/dL)   Date Value   12/06/2022 16   12/02/2022 14   11/23/2022 15     Glucose (mg/dL)   Date Value   12/06/2022 91   12/02/2022 99   11/23/2022 103 (H)   03/09/2012 91   10/05/2010 89     Calcium (mg/dL)   Date Value   12/06/2022 9.7   12/02/2022 9.7   11/23/2022 9.0       Last 3 BNP       Pro-BNP (pg/mL)   Date Value   12/02/2022 1,991 (H)   11/23/2022 1,669 (H)   11/17/2022 2,168 (H)          CBC: No results for input(s): WBC, HGB, PLT in the last 72 hours. BMP:  No results for input(s): NA, K, CL, CO2, BUN, CREATININE, GLUCOSE in the last 72 hours. Hepatic: No results for input(s): AST, ALT, ALB, BILITOT, ALKPHOS in the last 72 hours. Troponin: No results for input(s): TROPONINI in the last 72 hours. BNP: No results for input(s): BNP in the last 72 hours. Lipids: No results for input(s): CHOL, HDL in the last 72 hours. Invalid input(s): LDLCALCU  INR: No results for input(s): INR in the last 72 hours. WEIGHTS:    Wt Readings from Last 3 Encounters:   12/12/22 256 lb (116.1 kg)   12/02/22 259 lb (117.5 kg)   12/01/22 260 lb 12.8 oz (118.3 kg)         TELEMETRY:  Cardiac Regularity: Regular  Cardiac Rhythm/Interpretation: NSR        ASSESSMENT:  Rogers Duy Loyd's weight is down 3 lbs from last visit on 12-2-22. States that her SOB is at [employer]. She started Entresto today at 0700. States that she \"does have some lightheadedness since starting but knows she needs to get used to it\".        Interventions completed this visit:  IV diuretics given no  Lab work obtained yes, BMP,BNP   Reviewed currently prescribed medications with patient, educated on importance of compliance and answered any questions regarding their medication  Educated on signs and symptoms of HF  Educated on low sodium diet    PLAN:  Scheduled to follow up in CHF clinic on   Future Appointments   Date Time Provider Yesica Bryant   12/14/2022  9:30 AM Nettie Boyd MD [facility]   12/15/2022  1:00 PM Zoie Liu MD [address] [place] [address]   12/20/2022  8:45 AM [facility] ROOM 1 SEYZ [facility]   1/27/2023 10:00 PM [facility] BEDROOM 1 PHIL SLEEP [place] [facility] Given clinic phone number and aware of signs and symptoms to call with any HF change in symptoms.

## 2022-12-14 ENCOUNTER — OFFICE VISIT (OUTPATIENT)
Dept: INTERNAL MEDICINE | Age: 57
End: 2022-12-14
Payer: COMMERCIAL

## 2022-12-14 VITALS
HEIGHT: 65 IN | DIASTOLIC BLOOD PRESSURE: 79 MMHG | SYSTOLIC BLOOD PRESSURE: 170 MMHG | BODY MASS INDEX: 42.37 KG/M2 | RESPIRATION RATE: 18 BRPM | HEART RATE: 76 BPM | TEMPERATURE: 97.5 F | OXYGEN SATURATION: 96 % | WEIGHT: 254.3 LBS

## 2022-12-14 DIAGNOSIS — I50.20 HEART FAILURE WITH REDUCED EJECTION FRACTION (HCC): ICD-10-CM

## 2022-12-14 DIAGNOSIS — I10 ESSENTIAL HYPERTENSION: Primary | ICD-10-CM

## 2022-12-14 PROCEDURE — 99212 OFFICE O/P EST SF 10 MIN: CPT | Performed by: INTERNAL MEDICINE

## 2022-12-14 RX ORDER — ERGOCALCIFEROL 1.25 MG/1
50000 CAPSULE ORAL WEEKLY
Qty: 6 CAPSULE | Refills: 0 | Status: CANCELLED | OUTPATIENT
Start: 2022-12-14 | End: 2023-02-02

## 2022-12-14 RX ORDER — CARVEDILOL 12.5 MG/1
25 TABLET ORAL 2 TIMES DAILY WITH MEALS
Qty: 60 TABLET | Refills: 2 | Status: SHIPPED | OUTPATIENT
Start: 2022-12-14

## 2022-12-14 SDOH — ECONOMIC STABILITY: FOOD INSECURITY: WITHIN THE PAST 12 MONTHS, YOU WORRIED THAT YOUR FOOD WOULD RUN OUT BEFORE YOU GOT MONEY TO BUY MORE.: SOMETIMES TRUE

## 2022-12-14 SDOH — ECONOMIC STABILITY: FOOD INSECURITY: WITHIN THE PAST 12 MONTHS, THE FOOD YOU BOUGHT JUST DIDN'T LAST AND YOU DIDN'T HAVE MONEY TO GET MORE.: SOMETIMES TRUE

## 2022-12-14 SDOH — ECONOMIC STABILITY: HOUSING INSECURITY
IN THE LAST 12 MONTHS, WAS THERE A TIME WHEN YOU DID NOT HAVE A STEADY PLACE TO SLEEP OR SLEPT IN A SHELTER (INCLUDING NOW)?: NO

## 2022-12-14 SDOH — ECONOMIC STABILITY: TRANSPORTATION INSECURITY
IN THE PAST 12 MONTHS, HAS THE LACK OF TRANSPORTATION KEPT YOU FROM MEDICAL APPOINTMENTS OR FROM GETTING MEDICATIONS?: NO

## 2022-12-14 SDOH — ECONOMIC STABILITY: HOUSING INSECURITY: IN THE LAST 12 MONTHS, HOW MANY PLACES HAVE YOU LIVED?: 1

## 2022-12-14 SDOH — ECONOMIC STABILITY: TRANSPORTATION INSECURITY
IN THE PAST 12 MONTHS, HAS LACK OF TRANSPORTATION KEPT YOU FROM MEETINGS, WORK, OR FROM GETTING THINGS NEEDED FOR DAILY LIVING?: NO

## 2022-12-14 SDOH — ECONOMIC STABILITY: INCOME INSECURITY: IN THE LAST 12 MONTHS, WAS THERE A TIME WHEN YOU WERE NOT ABLE TO PAY THE MORTGAGE OR RENT ON TIME?: YES

## 2022-12-14 ASSESSMENT — PATIENT HEALTH QUESTIONNAIRE - PHQ9
SUM OF ALL RESPONSES TO PHQ QUESTIONS 1-9: 4
SUM OF ALL RESPONSES TO PHQ QUESTIONS 1-9: 4
8. MOVING OR SPEAKING SO SLOWLY THAT OTHER PEOPLE COULD HAVE NOTICED. OR THE OPPOSITE, BEING SO FIGETY OR RESTLESS THAT YOU HAVE BEEN MOVING AROUND A LOT MORE THAN USUAL: 0
SUM OF ALL RESPONSES TO PHQ9 QUESTIONS 1 & 2: 1
2. FEELING DOWN, DEPRESSED OR HOPELESS: 1
9. THOUGHTS THAT YOU WOULD BE BETTER OFF DEAD, OR OF HURTING YOURSELF: 0
7. TROUBLE CONCENTRATING ON THINGS, SUCH AS READING THE NEWSPAPER OR WATCHING TELEVISION: 0
SUM OF ALL RESPONSES TO PHQ QUESTIONS 1-9: 4
5. POOR APPETITE OR OVEREATING: 0
3. TROUBLE FALLING OR STAYING ASLEEP: 3
SUM OF ALL RESPONSES TO PHQ QUESTIONS 1-9: 4
6. FEELING BAD ABOUT YOURSELF - OR THAT YOU ARE A FAILURE OR HAVE LET YOURSELF OR YOUR FAMILY DOWN: 0
4. FEELING TIRED OR HAVING LITTLE ENERGY: 0
1. LITTLE INTEREST OR PLEASURE IN DOING THINGS: 0

## 2022-12-14 ASSESSMENT — ENCOUNTER SYMPTOMS
ABDOMINAL PAIN: 0
BLOOD IN STOOL: 0
CHEST TIGHTNESS: 0
SHORTNESS OF BREATH: 0

## 2022-12-14 ASSESSMENT — SOCIAL DETERMINANTS OF HEALTH (SDOH): HOW HARD IS IT FOR YOU TO PAY FOR THE VERY BASICS LIKE FOOD, HOUSING, MEDICAL CARE, AND HEATING?: HARD

## 2022-12-14 NOTE — PROGRESS NOTES
[facility]  Attending Physician Statement:  Ez Bass M.D., F.A.C.P. I have discussed the case, including pertinent history and exam findings with the resident. I agree with the assessment, plan and orders as documented by the resident. Patient is seen for fu visit today. -- acute and chronic problems addressed  Remainder of medical problems as per resident note. Last office notes reviewed, relative labs and imaging. Health maintenance issues of vaccinations, depression screening, tobacco cessation   Billiing assessed by time spent with review of medical records, time spent coordinating care with residents, nurses and patient  +medical complexity of case    Last visit NOV:   At that time fu hospital with Flash pulm edema  HTN emergency- echo stage 2 diastolic heart  Non ischemic cardiomyopathy  exac by HTN emergency  And +influenza B  Fu chf clinic  (Ischemic workup planned by cardio in future if need)     Copd- combivent inhaler ?too much money-- not taking  +MOORE - NOT Wheezes- with exertion  More likely deconditioning  Inc bmi, ordered GRACE testing in past, -- too $     Then back in ER NOV--She was Rx abx from ER though- though she didn't start  OK for cefdinir- bc ongoing productive cough, but defer flagyl - doubt bacterial small bowel enteritis>  WBC N  Cough worsening  +productive (post infectious cough- flu B)  No fevers  Gastroenteritis +n/v -- viral vs toxigenic likely  Doubt bacterial \"enteritis\"  Small bowel-- \"  Several segments of small bowel demonstrating moderate diffuse wall   thickening suggestive of underlying enteritis   \"    Fu urine pending?   11/9--UA some blood +protein-- wbc 1-3    Diarrhea better, finised cefdinir, no fevers  Now feeling better- GI and cough, some cough and +MOORE persists  Chf clinic recently - started goal directed medical Tx  BP (!) 170/79 (Site: Left Upper Arm, Position: Sitting, Cuff Size: Large Adult)   Pulse 76   Temp 97.5 °F (36.4 °C) (Temporal)   Resp 18   Ht 5' 5\" (1.651 m)   Wt 254 lb 4.8 oz (115.3 kg)   SpO2 96% Comment: room air  BMI 42.32 kg/m²   Will titrate up coreg  HTN stil uncontrolled on entersto +lasix 20QD etc..  Might need to add other BP meds on fu  30min

## 2022-12-14 NOTE — PROGRESS NOTES
[facility] Internal Medicine      SUBJECTIVE:  Chantal De Luna (:  1965) is a 59 y.o. female here for evaluation of the following chief complaint(s):  1 Month Follow-Up, Hypertension, and Health Maintenance (Pneumo vaccine)    Ms. Niya Booth is a 59year old female who is here for regular follow-up She has a PMHx of Asthma, Mitral Valve Prolapse, Dyslipidemia, HTN, Obesity, OA. GERD    She is here for follow-up for her HTN. She was just started on Entresto per CHF clinic on Monday. Her BP today remains to be uncontrolled  -> 170 on repeat. HR is 81. She will be seeing Dr. Andrea Elizalde tomorrow. She is asymptomatic and otherwise has no complaints. She also had heart catheterization done last week which showed 30% occlusion of the LAD. Review of Systems   Constitutional:  Negative for fatigue. Respiratory:  Negative for chest tightness and shortness of breath. Cardiovascular:  Negative for chest pain, palpitations and leg swelling. Gastrointestinal:  Negative for abdominal pain and blood in stool. Genitourinary:  Negative for dysuria. Musculoskeletal:  Negative for arthralgias. Neurological:  Negative for headaches. Psychiatric/Behavioral:  Negative for dysphoric mood.       Current Outpatient Medications on File Prior to Visit   Medication Sig Dispense Refill    sacubitril-valsartan (ENTRESTO) 49-51 MG per tablet Take 1 tablet by mouth 2 times daily 60 tablet 3    furosemide (LASIX) 20 MG tablet Take 1 tablet by mouth daily 90 tablet 1    albuterol sulfate HFA (PROVENTIL;VENTOLIN;PROAIR) 108 (90 Base) MCG/ACT inhaler Inhale 2 puffs into the lungs every 6 hours as needed for Wheezing or Shortness of Breath 1 each 6    cetirizine (ZYRTEC) 10 MG tablet Take 1 tablet by mouth daily as needed for Allergies 30 tablet 4    rosuvastatin (CRESTOR) 20 MG tablet Take 1 tablet by mouth nightly 30 tablet 2    Acetaminophen 650 MG TABS Take 500 mg by mouth every 6 hours as needed for Pain 20 tablet 0    Vitamin D, Ergocalciferol, 23037 units CAPS Take 50,000 Units by mouth once a week for 8 doses (Patient not taking: Reported on 12/14/2022) 6 capsule 0     No current facility-administered medications on file prior to visit. OBJECTIVE:    VS:   Vitals:    12/14/22 0924 12/14/22 1026   BP: (!) 157/71 (!) 170/79   Site: Left Upper Arm Left Upper Arm   Position: Sitting Sitting   Cuff Size: Large Adult Large Adult   Pulse: 76    Resp: 18    Temp: 97.5 °F (36.4 °C)    TempSrc: Temporal    SpO2: 96%    Weight: 254 lb 4.8 oz (115.3 kg)    Height: 5' 5\" (1.651 m)      Physical Exam  Constitutional:       General: She is not in acute distress. Appearance: She is not ill-appearing. HENT:      Head: Normocephalic and atraumatic. Mouth/Throat:      Mouth: Mucous membranes are moist.   Eyes:      Extraocular Movements: Extraocular movements intact. Conjunctiva/sclera: Conjunctivae normal.      Pupils: Pupils are equal, round, and reactive to light. Cardiovascular:      Rate and Rhythm: Normal rate and regular rhythm. Pulses: Normal pulses. Heart sounds: Normal heart sounds. No murmur heard. No friction rub. No gallop. Pulmonary:      Effort: Pulmonary effort is normal.      Breath sounds: Normal breath sounds. No wheezing, rhonchi or rales. Abdominal:      General: There is no distension. Tenderness: There is no abdominal tenderness. Musculoskeletal:         General: No swelling. Right lower leg: No edema. Left lower leg: No edema. Skin:     General: Skin is warm. Coloration: Skin is not jaundiced or pale. Comments: Minor bruising from Cath insertion site right wrist    Neurological:      General: No focal deficit present. Mental Status: She is alert and oriented to person, place, and time. Motor: No weakness.           ASSESSMENT/PLAN:    Hypertension uncontrolled   - on Entresto, Lasix 20 mg, Coreg 12.5 mg BID   - Coreg uptitrated to 25 mg BID   - Continue BP checks at home     HFrEF EF 95% with mild systolic dysfunction   - GDMT as above   - Will be seeing Dr. Trung Hui as well tomorrow     All other chronic issues to be address with PCP on next visit. RTC:  Return in about 1 month (around 1/16/2023) for PCP follow-up, BP check . Time spent:  15 Minutes  I have reviewed my findings and recommendations with Rudolph Hill and Dr. Ralf Nava.     Hong More MD   12/14/2022 3:38 PM

## 2022-12-16 ENCOUNTER — OFFICE VISIT (OUTPATIENT)
Dept: CARDIOLOGY CLINIC | Age: 57
End: 2022-12-16

## 2022-12-16 VITALS
HEART RATE: 71 BPM | DIASTOLIC BLOOD PRESSURE: 100 MMHG | BODY MASS INDEX: 43.12 KG/M2 | OXYGEN SATURATION: 98 % | RESPIRATION RATE: 18 BRPM | HEIGHT: 65 IN | WEIGHT: 258.8 LBS | SYSTOLIC BLOOD PRESSURE: 150 MMHG

## 2022-12-16 DIAGNOSIS — I50.20 SYSTOLIC CONGESTIVE HEART FAILURE, UNSPECIFIED HF CHRONICITY (HCC): Primary | ICD-10-CM

## 2022-12-16 DIAGNOSIS — Z79.899 NEW MEDICATION ADDED: ICD-10-CM

## 2022-12-16 DIAGNOSIS — I50.43 CHF (CONGESTIVE HEART FAILURE), NYHA CLASS I, ACUTE ON CHRONIC, COMBINED (HCC): ICD-10-CM

## 2022-12-16 NOTE — PATIENT INSTRUCTIONS
Blood pressure remains elevated. Continue Coreg 25 mg p.o. twice daily. Increase Entresto to 97/1031 p.o. twice daily. Patient was advised to monitor blood pressure daily and call me with readings in 1 week. If the blood pressure remains elevated then will consider adding spironolactone 25 mg p.o. daily in 1 week. Blood pressure goal is less than 120/80  Follow low-salt diet restrict daily salt intake less than 2 g. Continue furosemide 20 mg p.o. daily and rosuvastatin 20 mg p.o. daily  Sleep study as scheduled. Patient was also recommended to cut down calories and and also walk on a regular basis for weight loss management. Follow-up with me in 1 month.

## 2022-12-16 NOTE — PROGRESS NOTES
dysfunction with an EF of 40-45% along with hyperlipidemia, influenza infection and bronchial asthma. She underwent cardiac catheterization which showed moderate obstructive CAD. Blood pressure remains elevated today's blood pressure. She told me she is compliant with medications as well as salt and fluid intake. She is scheduled for a sleep study and in January 2022. Recently, she underwent cardiac cath on 12/7/22 which showed moderate obstructive CAD. No new cardiac complaints since last cardiology evaluation. She denies recent chest pain, SOB, palpitations, lightheadedness, dizziness, syncope, PND, or orthopnea. SR on EKG.     Review of Systems:   Cardiac: As per HPI  General: No fever, chills  Pulmonary: As per HPI  HEENT: No visual disturbances, difficult swallowing  GI: No nausea, vomiting  Endocrine: No thyroid disease or DM  Musculoskeletal: SEGURA x 4, no focal motor deficits  Skin: Intact, no rashes  Neuro/Psych: No headache or seizures    Past Medical History:  Past Medical History:   Diagnosis Date    Abnormal carotid pulse 3/30/2017    Asthma     Depression     Dyslipidemia     Hypertension     Mitral valve prolapse     Obesity     Osteoarthritis        Past Surgical History:  Past Surgical History:   Procedure Laterality Date    ABDOMEN SURGERY      CARDIAC CATHETERIZATION  12/07/2022    Dr. No Robison (CERVIX STATUS UNKNOWN)  01/01/2000    INCISIONAL HERNIA REPAIR  04/29/2014    Dr. Morgan Levin  childhood       Family History:  Family History   Problem Relation Age of Onset    Asthma Mother     High Blood Pressure Mother     Diabetes Mother     Heart Disease Mother     Arthritis Mother     Stroke Mother     High Blood Pressure Father     Diabetes Father     Cancer Father     Mental Illness Father     Stroke Father     Asthma Sister     High Blood Pressure Sister     High Blood Pressure Sister     Mental Illness Sister     High Blood Pressure Sister     Arthritis Sister     High Blood Pressure Sister     Diabetes Sister        Social History:  Social History     Socioeconomic History    Marital status: Single     Spouse name: Not on file    Number of children: Not on file    Years of education: Not on file    Highest education level: Not on file   Occupational History    Not on file   Tobacco Use    Smoking status: Never    Smokeless tobacco: Never   Vaping Use    Vaping Use: Never used   Substance and Sexual Activity    Alcohol use: Yes     Alcohol/week: 0.0 standard drinks     Types: 2 - 3 Cans of beer per week     Comment: social    Drug use: No    Sexual activity: Never     Partners: Male   Other Topics Concern    Not on file   Social History Narrative    Not on file     Social Determinants of Health     Financial Resource Strain: High Risk    Difficulty of Paying Living Expenses: Hard   Food Insecurity: Food Insecurity Present    Worried About [address] [employer] in the Last Year: Sometimes true    Ran Out of Food in the Last Year: Sometimes true   Transportation Needs: No Transportation Needs    Lack of Transportation (Medical): No    Lack of Transportation (Non-Medical): No   Physical Activity: Not on file   Stress: Not on file   Social Connections: Not on file   Intimate Partner Violence: Not on file   Housing Stability: High Risk    Unable to Pay for Housing in the Last Year: Yes    Number of Places Lived in the Last Year: 1    Unstable Housing in the Last Year: No       Allergies:   Allergies   Allergen Reactions    Doxycycline Swelling and Other (See Comments)    Iodine Hives     IV dye    Toradol [Ketorolac Tromethamine] Anaphylaxis    Iodides Hives       Current Medications:  Current Outpatient Medications   Medication Sig Dispense Refill    carvedilol (COREG) 12.5 MG tablet Take 2 tablets by mouth 2 times daily (with meals) 60 tablet 2    sacubitril-valsartan (ENTRESTO) 49-51 MG per tablet Take 1 tablet by mouth 2 times daily 60 tablet 3    furosemide (LASIX) 20 MG tablet Take 1 tablet by mouth daily 90 tablet 1    albuterol sulfate HFA (PROVENTIL;VENTOLIN;PROAIR) 108 (90 Base) MCG/ACT inhaler Inhale 2 puffs into the lungs every 6 hours as needed for Wheezing or Shortness of Breath 1 each 6    cetirizine (ZYRTEC) 10 MG tablet Take 1 tablet by mouth daily as needed for Allergies 30 tablet 4    rosuvastatin (CRESTOR) 20 MG tablet Take 1 tablet by mouth nightly 30 tablet 2    Acetaminophen 650 MG TABS Take 500 mg by mouth every 6 hours as needed for Pain 20 tablet 0     No current facility-administered medications for this visit. Physical Exam:  BP (!) 164/102   Pulse 71   Resp 18   Ht 5' 5\" (1.651 m)   Wt 258 lb 12.8 oz (117.4 kg)   SpO2 98%   BMI 43.07 kg/m²   Wt Readings from Last 3 Encounters:   12/16/22 258 lb 12.8 oz (117.4 kg)   12/14/22 254 lb 4.8 oz (115.3 kg)   12/12/22 256 lb (116.1 kg)     Appearance: Awake, alert and oriented x 3, no acute respiratory distress  Skin: Intact, no rash  Head: Normocephalic, atraumatic  Eyes: EOMI, no conjunctival erythema  ENMT: No pharyngeal erythema, MMM, no rhinorrhea  Neck: Supple, no elevated JVP, no carotid bruits  Lungs: Clear to auscultation bilaterally. No wheezes, rales, or rhonchi.   Cardiac: Regular rate and rhythm, +S1S2, no murmurs apparent  Abdomen: Soft, nontender, +bowel sounds  Extremities: Moves all extremities x 4, no lower extremity edema  Neurologic: No focal motor deficits apparent, normal mood and affect, alert and oriented x 3  Peripheral Pulses: Intact posterior tibial pulses bilaterally    Laboratory Tests:  Lab Results   Component Value Date    CREATININE 1.0 12/12/2022    BUN 14 12/12/2022     12/12/2022    K 3.9 12/12/2022     (H) 12/12/2022    CO2 25 12/12/2022     Lab Results   Component Value Date/Time    MG 2.1 11/08/2022 10:44 PM     Lab Results   Component Value Date    WBC 4.3 (L) 12/06/2022    HGB 10.4 (L) 12/06/2022    HCT 32.3 (L) 12/06/2022    MCV 88.3 12/06/2022     12/06/2022     Lab Results   Component Value Date    ALT 15 11/08/2022    AST 17 11/08/2022    ALKPHOS 89 11/08/2022    BILITOT 0.4 11/08/2022     No results found for: CKTOTAL, CKMB, CKMBINDEX, TROPONINI  Lab Results   Component Value Date    INR 1.0 12/06/2022    INR 1.2 03/08/2015    PROTIME 11.0 12/06/2022    PROTIME 13.2 (H) 03/08/2015     Lab Results   Component Value Date    TSH 0.780 10/30/2022     Lab Results   Component Value Date    LABA1C 5.1 10/30/2022     No results found for: EAG  Lab Results   Component Value Date    CHOL 252 (H) 10/30/2022    CHOL 228 (H) 05/01/2013    CHOL 221 (H) 03/09/2012     Lab Results   Component Value Date    TRIG 59 10/30/2022    TRIG 78 05/01/2013    TRIG 82 03/09/2012     Lab Results   Component Value Date    HDL 86 10/30/2022    HDL 67.0 05/01/2013    HDL 66.0 03/09/2012     Lab Results   Component Value Date    LDLCALC 154 (H) 10/30/2022    LDLCALC 145 (H) 05/01/2013    LDLCALC 139 (H) 03/09/2012     Lab Results   Component Value Date    LABVLDL 12 10/30/2022     No results found for: CHOLHDLRATIO    Cardiac Tests:  ECG: Normal sinus rhythm, LVH, abnormal EKG. Echocardiogram - 10/31/22:    Left ventricle is normal in size . No regional wall motion abnormalities seen. Mildly reduced left ventricular systolic dysfunction. LVEF 45   Mildly dilated right ventricle. Mildly enlarged right atrium size. Mild aortic stenosis. Mild tricuspid regurgitation. Pulmonary hypertension is mild . TTE- 11/1/22   Mildly dilated left ventricle. Ejection fraction is visually estimated at 40-45%. Overall ejection fraction mild-to-moderately decreased . Anterior and anterolateral walls are hypokinetic. Moderate concentric left ventricular hypertrophy. Stage II diastolic dysfunction. Technically difficult examination. Definity echo contrast was used to   delineate endocardial borders.     Stress test:        Cardiac catheterization 12/7/2022:   CORONARY ANATOMY:  Left main:  It is a long and short artery with no angiographic stenosis  noted. LAD:  It is a large artery wrapping around the apex and giving rise to a  large bifurcating diagonal branch and several septal perforators. There  was 30-40% discrete mid LAD stenosis. Left circumflex: It is a large artery giving rise to a very small first  obtuse marginal branch, a large trifurcating second obtuse marginal  branch, then to a smaller third obtuse marginal branch. No angiographic  stenosis noted in the circumflex or its branches. Right coronary artery: It is medium in size giving rise to a conus  branch, two RV marginal branches, a small PDA and small PLV branch. There was 50% ostial discrete PDA stenosis. IMPRESSION:  1. Mild CAD. 2.  Elevated LVEDP at 25 mmHg. 3.  Uncontrolled blood pressure. The 10-year ASCVD risk score (Travon CABALLERO, et al., 2019) is: 12.2%    Values used to calculate the score:      Age: 59 years      Sex: Female      Is Non- : Yes      Diabetic: No      Tobacco smoker: No      Systolic Blood Pressure: 340 mmHg      Is BP treated: Yes      HDL Cholesterol: 86 mg/dL      Total Cholesterol: 252 mg/dL        ASSESSMENT:  Chronic heart failure with mid range EF, euvolemic  Nonischemic cardiomyopathy  Poorly controlled hypertension, current blood pressure 164/102  New onset LV dysfunction, echo results reviewed and EF appears in the 50s range with hypokinesis of the inferior and inferolateral walls. HLD on statin  Bronchial asthma  Morbid obesity with possible GRACE  GERD  OA    Plan:   Blood pressure remains elevated. Continue Coreg 25 mg p.o. twice daily. Increase Entresto to 97/1031 p.o. twice daily. Patient was advised to monitor blood pressure daily and call me with readings in 1 week. If the blood pressure remains elevated then will consider adding spironolactone 25 mg p.o. daily in 1 week.   Blood pressure goal is less than 120/80  Follow low-salt diet restrict daily salt intake less than 2 g. Continue furosemide 20 mg p.o. daily and rosuvastatin 20 mg p.o. daily  Sleep study as scheduled. Patient was also recommended to cut down calories and and also walk on a regular basis for weight loss management. Follow-up with me in 1 month. The patient's current medication list, allergies, problem list and results of all previously ordered testing were reviewed at today's visit.   Glenda Dowling MD  [facility]) Cardiology

## 2022-12-20 ENCOUNTER — HOSPITAL ENCOUNTER (OUTPATIENT)
Dept: OTHER | Age: 57
Setting detail: THERAPIES SERIES
Discharge: HOME | End: 2022-12-20
Payer: COMMERCIAL

## 2022-12-20 VITALS
RESPIRATION RATE: 18 BRPM | BODY MASS INDEX: 42.97 KG/M2 | HEART RATE: 64 BPM | DIASTOLIC BLOOD PRESSURE: 85 MMHG | SYSTOLIC BLOOD PRESSURE: 167 MMHG | OXYGEN SATURATION: 98 % | WEIGHT: 258.2 LBS

## 2022-12-20 LAB
ANION GAP SERPL CALCULATED.3IONS-SCNC: 8 MMOL/L (ref 7–16)
BUN BLDV-MCNC: 12 MG/DL (ref 6–20)
CALCIUM SERPL-MCNC: 9.4 MG/DL (ref 8.6–10.2)
CHLORIDE BLD-SCNC: 108 MMOL/L (ref 98–107)
CO2: 24 MMOL/L (ref 22–29)
CREAT SERPL-MCNC: 0.9 MG/DL (ref 0.5–1)
GFR SERPL CREATININE-BSD FRML MDRD: >60 ML/MIN/1.73
GLUCOSE BLD-MCNC: 92 MG/DL (ref 74–99)
POTASSIUM SERPL-SCNC: 3.8 MMOL/L (ref 3.5–5)
PRO-BNP: 750 PG/ML (ref 0–125)
SODIUM BLD-SCNC: 140 MMOL/L (ref 132–146)

## 2022-12-20 PROCEDURE — 83880 ASSAY OF NATRIURETIC PEPTIDE: CPT

## 2022-12-20 PROCEDURE — 80048 BASIC METABOLIC PNL TOTAL CA: CPT

## 2022-12-20 PROCEDURE — 36415 COLL VENOUS BLD VENIPUNCTURE: CPT

## 2022-12-20 NOTE — PROGRESS NOTES
Congestive Heart Failure [address] [facility]   1965          Referring Provider: Izabel Hazel  Primary Care Physician: Myla De La Cruz  Cardiologist: Jason Valdez  Nephrologist: N/A        History of Present Illness:     Arian Cortez is a 59 y.o. female with a history of HFrEF, most recent EF 40-45% 11/1/2022. Patient Story:    She does  have dyspnea with exertion, shortness of breath, or decline in overall functional capacity. She does have orthopnea, PND, nocturnal cough or hemoptysis. She does NOT have abdominal distention or bloating, early satiety, anorexia/change in appetite. She has a good response to her oral diuretic. She does not have  lower extremity edema. She admits to some lightheadedness, dizziness since starting Entresto today at 0700. She denies palpitations, syncope or near syncope. She does not complain of chest pain, pressure, discomfort. Allergies   Allergen Reactions    Doxycycline Swelling and Other (See Comments)    Iodine Hives     IV dye    Toradol [Ketorolac Tromethamine] Anaphylaxis    Iodides Hives         No outpatient medications have been marked as taking for the 12/20/22 encounter [facility] HOSPITAL Encounter) with [facility] ROOM 1.      Current Outpatient Medications on File Prior to Encounter   Medication Sig Dispense Refill    sacubitril-valsartan (ENTRESTO)  MG per tablet Take 1 tablet by mouth 2 times daily 60 tablet 5    carvedilol (COREG) 12.5 MG tablet Take 2 tablets by mouth 2 times daily (with meals) 60 tablet 2    furosemide (LASIX) 20 MG tablet Take 1 tablet by mouth daily 90 tablet 1    albuterol sulfate HFA (PROVENTIL;VENTOLIN;PROAIR) 108 (90 Base) MCG/ACT inhaler Inhale 2 puffs into the lungs every 6 hours as needed for Wheezing or Shortness of Breath 1 each 6    cetirizine (ZYRTEC) 10 MG tablet Take 1 tablet by mouth daily as needed for Allergies 30 tablet 4    rosuvastatin (CRESTOR) 20 MG tablet Take 1 tablet by mouth nightly 30 tablet 2    Acetaminophen 650 MG TABS Take 500 mg by mouth every 6 hours as needed for Pain 20 tablet 0     No current facility-administered medications on file prior to encounter. Guideline directed medical:  ARNI/ACE I/ARB: Yes  Beta blocker:   Yes  Aldosterone antagonist:  No  SGLT2i:  No        Physical Examination:     BP (!) 167/85   Pulse 64   Resp 18   Wt 258 lb 3.2 oz (117.1 kg)   SpO2 98%   BMI 42.97 kg/m²     Assessment  Charting Type: Shift assessment    Neurological  Level of Consciousness: Alert (0)              Respiratory  L Breath Sounds: Clear, Diminished  R Breath Sounds: Clear, Diminished              Cardiac  Cardiac Regularity: Regular  Heart Sounds: S1, S2  Cardiac Rhythm: Sinus rhythm    Rhythm Interpretation  Heart Rate: 64         Gastrointestinal  Abdominal (WDL): Within Defined Limits               Peripheral Vascular  Peripheral Vascular (WDL): Within Defined Limits  RLE Edema: None  LLE Edema: None                   Genitourinary  Genitourinary (WDL): Within Defined Limits    Psychosocial  Psychosocial (WDL): Within Defined Limits                        Heart Rate: 64                     LAB DATA:    Last 3 BMP      Sodium (mmol/L)   Date Value   12/12/2022 141   12/06/2022 140   12/02/2022 142     Potassium (mmol/L)   Date Value   12/12/2022 3.9   12/06/2022 3.9   12/02/2022 3.9     Potassium reflex Magnesium (mmol/L)   Date Value   10/30/2022 3.8     Chloride (mmol/L)   Date Value   12/12/2022 108 (H)   12/06/2022 109 (H)   12/02/2022 107     CO2 (mmol/L)   Date Value   12/12/2022 25   12/06/2022 23   12/02/2022 25     BUN (mg/dL)   Date Value   12/12/2022 14   12/06/2022 16   12/02/2022 14     Glucose (mg/dL)   Date Value   12/12/2022 84   12/06/2022 91   12/02/2022 99   03/09/2012 91   10/05/2010 89     Calcium (mg/dL)   Date Value   12/12/2022 9.7   12/06/2022 9.7   12/02/2022 9.7       Last 3 BNP       Pro-BNP (pg/mL)   Date Value   12/12/2022 1,772 (H)   12/02/2022 1,991 (H)   11/23/2022 1,669 (H)          CBC: No results for input(s): WBC, HGB, PLT in the last 72 hours. BMP:  No results for input(s): NA, K, CL, CO2, BUN, CREATININE, GLUCOSE in the last 72 hours. Hepatic: No results for input(s): AST, ALT, ALB, BILITOT, ALKPHOS in the last 72 hours. Troponin: No results for input(s): TROPONINI in the last 72 hours. BNP: No results for input(s): BNP in the last 72 hours. Lipids: No results for input(s): CHOL, HDL in the last 72 hours. Invalid input(s): LDLCALCU  INR: No results for input(s): INR in the last 72 hours. WEIGHTS:    Wt Readings from Last 3 Encounters:   12/20/22 258 lb 3.2 oz (117.1 kg)   12/16/22 258 lb 12.8 oz (117.4 kg)   12/14/22 254 lb 4.8 oz (115.3 kg)         TELEMETRY:  Cardiac Regularity: Regular  Cardiac Rhythm/Interpretation: NSR        ASSESSMENT:  Tequila Loyd's weight is up 2lbs. Not drinking enough. Recently increased Entresto to . Interventions completed this visit:  IV diuretics given no  Lab work obtained yes, BMP,BNP   Reviewed currently prescribed medications with patient, educated on importance of compliance and answered any questions regarding their medication  Educated on signs and symptoms of HF  Educated on low sodium diet    PLAN:  Scheduled to follow up in CHF clinic on   Future Appointments   Date Time Provider Yesica Bryant   1/3/2023  8:15 AM [facility]   1/17/2023 10:45 AM DO ROVERTO Teran IM [facility]   1/27/2023 10:00 PM [facility] BEDROOM 1 [facility]   1/31/2023 10:20 AM Luis Enrique Acosta MD [address] [facility]     Given clinic phone number and aware of signs and symptoms to call with any HF change in symptoms.

## 2023-01-03 ENCOUNTER — HOSPITAL ENCOUNTER (OUTPATIENT)
Dept: OTHER | Age: 58
Setting detail: THERAPIES SERIES
Discharge: HOME | End: 2023-01-03
Payer: COMMERCIAL

## 2023-01-03 VITALS
DIASTOLIC BLOOD PRESSURE: 81 MMHG | SYSTOLIC BLOOD PRESSURE: 141 MMHG | BODY MASS INDEX: 44.1 KG/M2 | WEIGHT: 265 LBS | HEART RATE: 65 BPM | OXYGEN SATURATION: 97 % | RESPIRATION RATE: 18 BRPM

## 2023-01-03 LAB
ANION GAP SERPL CALCULATED.3IONS-SCNC: 10 MMOL/L (ref 7–16)
BUN BLDV-MCNC: 14 MG/DL (ref 6–20)
CALCIUM SERPL-MCNC: 9.2 MG/DL (ref 8.6–10.2)
CHLORIDE BLD-SCNC: 108 MMOL/L (ref 98–107)
CO2: 21 MMOL/L (ref 22–29)
CREAT SERPL-MCNC: 0.8 MG/DL (ref 0.5–1)
GFR SERPL CREATININE-BSD FRML MDRD: >60 ML/MIN/1.73
GLUCOSE BLD-MCNC: 109 MG/DL (ref 74–99)
POTASSIUM SERPL-SCNC: 4 MMOL/L (ref 3.5–5)
PRO-BNP: 762 PG/ML (ref 0–125)
SODIUM BLD-SCNC: 139 MMOL/L (ref 132–146)

## 2023-01-03 PROCEDURE — 99214 OFFICE O/P EST MOD 30 MIN: CPT

## 2023-01-03 PROCEDURE — 80048 BASIC METABOLIC PNL TOTAL CA: CPT

## 2023-01-03 PROCEDURE — 83880 ASSAY OF NATRIURETIC PEPTIDE: CPT

## 2023-01-03 PROCEDURE — 36415 COLL VENOUS BLD VENIPUNCTURE: CPT

## 2023-01-03 NOTE — PLAN OF CARE
Problem: Chronic Conditions and Co-morbidities  Goal: Patient's chronic conditions and co-morbidity symptoms are monitored and maintained or improved  Flowsheets (Taken 1/3/2023 1922)  Care Plan - Patient's Chronic Conditions and Co-Morbidity Symptoms are Monitored and Maintained or Improved: Monitor and assess patient's chronic conditions and comorbid symptoms for stability, deterioration, or improvement

## 2023-01-03 NOTE — PROGRESS NOTES
Congestive Heart Failure [address] [facility]   1965          Referring Provider: Mary Kay Carter  Primary Care Physician: Ami Rendon  Cardiologist: Cydney Lomas  Nephrologist: N/A        History of Present Illness:     Finn Srivastava is a 59 y.o. female with a history of HFrEF, most recent EF 40-45% 11/1/2022. Patient Story:    She does  have   slight dyspnea with exertion, shortness of breath, or decline in overall functional capacity. She does  not have orthopnea, PND, nocturnal cough or hemoptysis. She does NOT have abdominal distention or bloating, early satiety, anorexia/change in appetite. She has a good response to her oral diuretic. She does not have  lower extremity edema. She denies lightheadedness, dizziness. She denies palpitations, syncope or near syncope. She does not complain of chest pain, pressure, discomfort. Admits that she may not be drinking 64 oz daily. I encouraged her to drink more. She is receptive to that.      Allergies   Allergen Reactions    Doxycycline Swelling and Other (See Comments)    Iodine Hives     IV dye    Toradol [Ketorolac Tromethamine] Anaphylaxis    Iodides Hives           Current Outpatient Medications on File Prior to Encounter   Medication Sig Dispense Refill    sacubitril-valsartan (ENTRESTO)  MG per tablet Take 1 tablet by mouth 2 times daily 60 tablet 5    carvedilol (COREG) 12.5 MG tablet Take 2 tablets by mouth 2 times daily (with meals) 60 tablet 2    furosemide (LASIX) 20 MG tablet Take 1 tablet by mouth daily 90 tablet 1    albuterol sulfate HFA (PROVENTIL;VENTOLIN;PROAIR) 108 (90 Base) MCG/ACT inhaler Inhale 2 puffs into the lungs every 6 hours as needed for Wheezing or Shortness of Breath 1 each 6    cetirizine (ZYRTEC) 10 MG tablet Take 1 tablet by mouth daily as needed for Allergies 30 tablet 4    rosuvastatin (CRESTOR) 20 MG tablet Take 1 tablet by mouth nightly 30 tablet 2    Acetaminophen 650 MG TABS Take 500 mg by mouth every 6 hours as needed for Pain 20 tablet 0     No current facility-administered medications on file prior to encounter. Guideline directed medical:  ARNI/ACE I/ARB: Yes  Beta blocker:   Yes  Aldosterone antagonist:  No  SGLT2i:  No        Physical Examination:     BP (!) 141/81   Pulse 65   Resp 18   Wt 265 lb (120.2 kg)   SpO2 97%   BMI 44.10 kg/m²     Assessment  Charting Type: Shift assessment                   Respiratory  L Breath Sounds: Clear, Diminished  R Breath Sounds: Clear, Diminished              Cardiac  Cardiac Regularity: Regular  Heart Sounds: S1, S2  Cardiac Rhythm: Sinus rhythm    Rhythm Interpretation  Heart Rate: 65         Gastrointestinal  Abdominal (WDL): Within Defined Limits               Peripheral Vascular  Peripheral Vascular (WDL): Within Defined Limits  RLE Edema: None  LLE Edema: None                   Genitourinary  Genitourinary (WDL): Within Defined Limits    Psychosocial  Psychosocial (WDL): Within Defined Limits                        Heart Rate: 65                     LAB DATA:    Last 3 BMP      Sodium (mmol/L)   Date Value   12/20/2022 140   12/12/2022 141   12/06/2022 140     Potassium (mmol/L)   Date Value   12/20/2022 3.8   12/12/2022 3.9   12/06/2022 3.9     Potassium reflex Magnesium (mmol/L)   Date Value   10/30/2022 3.8     Chloride (mmol/L)   Date Value   12/20/2022 108 (H)   12/12/2022 108 (H)   12/06/2022 109 (H)     CO2 (mmol/L)   Date Value   12/20/2022 24   12/12/2022 25   12/06/2022 23     BUN (mg/dL)   Date Value   12/20/2022 12   12/12/2022 14   12/06/2022 16     Glucose (mg/dL)   Date Value   12/20/2022 92   12/12/2022 84   12/06/2022 91   03/09/2012 91   10/05/2010 89     Calcium (mg/dL)   Date Value   12/20/2022 9.4   12/12/2022 9.7   12/06/2022 9.7       Last 3 BNP       Pro-BNP (pg/mL)   Date Value   12/20/2022 750 (H)   12/12/2022 1,772 (H)   12/02/2022 1,991 (H)          CBC: No results for input(s): WBC, HGB, PLT in the last 72 hours. BMP:  No results for input(s): NA, K, CL, CO2, BUN, CREATININE, GLUCOSE in the last 72 hours. Hepatic: No results for input(s): AST, ALT, ALB, BILITOT, ALKPHOS in the last 72 hours. Troponin: No results for input(s): TROPONINI in the last 72 hours. BNP: No results for input(s): BNP in the last 72 hours. Lipids: No results for input(s): CHOL, HDL in the last 72 hours. Invalid input(s): LDLCALCU  INR: No results for input(s): INR in the last 72 hours. WEIGHTS:    Wt Readings from Last 3 Encounters:   01/03/23 265 lb (120.2 kg)   12/20/22 258 lb 3.2 oz (117.1 kg)   12/16/22 258 lb 12.8 oz (117.4 kg)         TELEMETRY:  Cardiac Regularity: Regular  Cardiac Rhythm/Interpretation: NSR        ASSESSMENT:  Jhonny Loyd's weight is up 7lbs. Pt admits to over eating at [facility]. Denies any increase shortness of breath or any increase in edema or feeling bloated. Interventions completed this visit:  IV diuretics given no  Lab work obtained yes, BMP,BNP   Reviewed currently prescribed medications with patient, educated on importance of compliance and answered any questions regarding their medication  Educated on signs and symptoms of HF  Educated on low sodium diet    PLAN:  Scheduled to follow up in CHF clinic on   Future Appointments   Date Time Provider Yesica Bryant   1/17/2023  8:15 AM [facility] CHF ROOM 1 [facility]   1/17/2023 10:45 AM Noah Riley DO [facility]   1/27/2023 10:00 PM [facility] BEDROOM 1 [facility] SLEEP [facility]   1/31/2023 10:20 AM Rochelle Connor MD [address] [facility]     Given clinic phone number and aware of signs and symptoms to call with any HF change in symptoms.

## 2023-01-16 NOTE — PROGRESS NOTES
The following care gaps have been identified:  Breast Cancer Screening  HIV Screen  Hep C Screen  Covid #2 - patient encouraged to obtain  Shingrix Series  PCV-20 - agreeable  TDaP - agreeable  Sirena Hartman LPN

## 2023-01-17 ENCOUNTER — HOSPITAL ENCOUNTER (OUTPATIENT)
Dept: OTHER | Age: 58
Setting detail: THERAPIES SERIES
Discharge: HOME | End: 2023-01-17
Payer: COMMERCIAL

## 2023-01-17 ENCOUNTER — OFFICE VISIT (OUTPATIENT)
Dept: INTERNAL MEDICINE | Age: 58
End: 2023-01-17
Payer: COMMERCIAL

## 2023-01-17 VITALS
OXYGEN SATURATION: 94 % | WEIGHT: 264 LBS | BODY MASS INDEX: 43.99 KG/M2 | TEMPERATURE: 97.2 F | HEART RATE: 71 BPM | HEIGHT: 65 IN | DIASTOLIC BLOOD PRESSURE: 74 MMHG | SYSTOLIC BLOOD PRESSURE: 128 MMHG | RESPIRATION RATE: 20 BRPM

## 2023-01-17 VITALS
DIASTOLIC BLOOD PRESSURE: 82 MMHG | HEART RATE: 56 BPM | SYSTOLIC BLOOD PRESSURE: 148 MMHG | OXYGEN SATURATION: 96 % | WEIGHT: 264 LBS | RESPIRATION RATE: 18 BRPM | BODY MASS INDEX: 43.93 KG/M2

## 2023-01-17 DIAGNOSIS — R05.3 COVID-19 LONG HAULER MANIFESTING CHRONIC COUGH: ICD-10-CM

## 2023-01-17 DIAGNOSIS — Z11.4 ENCOUNTER FOR SCREENING FOR HIV: ICD-10-CM

## 2023-01-17 DIAGNOSIS — Z23 NEED FOR SHINGLES VACCINE: ICD-10-CM

## 2023-01-17 DIAGNOSIS — I50.43 CHF (CONGESTIVE HEART FAILURE), NYHA CLASS I, ACUTE ON CHRONIC, COMBINED (HCC): ICD-10-CM

## 2023-01-17 DIAGNOSIS — U09.9 COVID-19 LONG HAULER MANIFESTING CHRONIC COUGH: ICD-10-CM

## 2023-01-17 DIAGNOSIS — I10 PRIMARY HYPERTENSION: ICD-10-CM

## 2023-01-17 DIAGNOSIS — J45.909 UNCOMPLICATED ASTHMA, UNSPECIFIED ASTHMA SEVERITY, UNSPECIFIED WHETHER PERSISTENT: ICD-10-CM

## 2023-01-17 DIAGNOSIS — Z12.31 ENCOUNTER FOR SCREENING MAMMOGRAM FOR BREAST CANCER: ICD-10-CM

## 2023-01-17 DIAGNOSIS — R92.8 MAMMOGRAM ABNORMAL: ICD-10-CM

## 2023-01-17 DIAGNOSIS — I50.20 HFREF (HEART FAILURE WITH REDUCED EJECTION FRACTION) (HCC): Primary | ICD-10-CM

## 2023-01-17 DIAGNOSIS — Z11.59 NEED FOR HEPATITIS C SCREENING TEST: ICD-10-CM

## 2023-01-17 DIAGNOSIS — Z12.31 ENCOUNTER FOR SCREENING MAMMOGRAM FOR MALIGNANT NEOPLASM OF BREAST: Primary | ICD-10-CM

## 2023-01-17 LAB
ANION GAP SERPL CALCULATED.3IONS-SCNC: 12 MMOL/L (ref 7–16)
BUN BLDV-MCNC: 16 MG/DL (ref 6–20)
CALCIUM SERPL-MCNC: 9.1 MG/DL (ref 8.6–10.2)
CHLORIDE BLD-SCNC: 106 MMOL/L (ref 98–107)
CO2: 23 MMOL/L (ref 22–29)
CREAT SERPL-MCNC: 0.8 MG/DL (ref 0.5–1)
GFR SERPL CREATININE-BSD FRML MDRD: >60 ML/MIN/1.73
GLUCOSE BLD-MCNC: 99 MG/DL (ref 74–99)
POTASSIUM SERPL-SCNC: 3.7 MMOL/L (ref 3.5–5)
PRO-BNP: 941 PG/ML (ref 0–125)
SODIUM BLD-SCNC: 141 MMOL/L (ref 132–146)

## 2023-01-17 PROCEDURE — 99214 OFFICE O/P EST MOD 30 MIN: CPT

## 2023-01-17 PROCEDURE — 36415 COLL VENOUS BLD VENIPUNCTURE: CPT

## 2023-01-17 PROCEDURE — 1036F TOBACCO NON-USER: CPT | Performed by: INTERNAL MEDICINE

## 2023-01-17 PROCEDURE — 99213 OFFICE O/P EST LOW 20 MIN: CPT | Performed by: INTERNAL MEDICINE

## 2023-01-17 PROCEDURE — 3074F SYST BP LT 130 MM HG: CPT | Performed by: INTERNAL MEDICINE

## 2023-01-17 PROCEDURE — 3078F DIAST BP <80 MM HG: CPT | Performed by: INTERNAL MEDICINE

## 2023-01-17 PROCEDURE — 3017F COLORECTAL CA SCREEN DOC REV: CPT | Performed by: INTERNAL MEDICINE

## 2023-01-17 PROCEDURE — G8417 CALC BMI ABV UP PARAM F/U: HCPCS | Performed by: INTERNAL MEDICINE

## 2023-01-17 PROCEDURE — G8482 FLU IMMUNIZE ORDER/ADMIN: HCPCS | Performed by: INTERNAL MEDICINE

## 2023-01-17 PROCEDURE — 83880 ASSAY OF NATRIURETIC PEPTIDE: CPT

## 2023-01-17 PROCEDURE — G8427 DOCREV CUR MEDS BY ELIG CLIN: HCPCS | Performed by: INTERNAL MEDICINE

## 2023-01-17 PROCEDURE — 80048 BASIC METABOLIC PNL TOTAL CA: CPT

## 2023-01-17 RX ORDER — CARVEDILOL 25 MG/1
25 TABLET ORAL 2 TIMES DAILY WITH MEALS
COMMUNITY
End: 2023-01-17 | Stop reason: SDUPTHER

## 2023-01-17 RX ORDER — CARVEDILOL 25 MG/1
25 TABLET ORAL 2 TIMES DAILY WITH MEALS
Qty: 180 TABLET | Refills: 1 | Status: SHIPPED | OUTPATIENT
Start: 2023-01-17

## 2023-01-17 RX ORDER — FUROSEMIDE 20 MG/1
20 TABLET ORAL DAILY
Qty: 90 TABLET | Refills: 1 | Status: SHIPPED | OUTPATIENT
Start: 2023-01-17

## 2023-01-17 RX ORDER — SPIRONOLACTONE 25 MG/1
25 TABLET ORAL DAILY
Qty: 90 TABLET | Refills: 1 | Status: SHIPPED | OUTPATIENT
Start: 2023-01-17

## 2023-01-17 RX ORDER — ZOSTER VACCINE RECOMBINANT, ADJUVANTED 50 MCG/0.5
0.5 KIT INTRAMUSCULAR SEE ADMIN INSTRUCTIONS
Qty: 0.5 ML | Refills: 0 | Status: SHIPPED | OUTPATIENT
Start: 2023-01-17 | End: 2023-07-16

## 2023-01-17 RX ORDER — ROSUVASTATIN CALCIUM 20 MG/1
20 TABLET, COATED ORAL NIGHTLY
Qty: 90 TABLET | Refills: 1 | Status: SHIPPED | OUTPATIENT
Start: 2023-01-17

## 2023-01-17 ASSESSMENT — PATIENT HEALTH QUESTIONNAIRE - PHQ9
5. POOR APPETITE OR OVEREATING: 0
1. LITTLE INTEREST OR PLEASURE IN DOING THINGS: 0
4. FEELING TIRED OR HAVING LITTLE ENERGY: 3
SUM OF ALL RESPONSES TO PHQ QUESTIONS 1-9: 6
SUM OF ALL RESPONSES TO PHQ QUESTIONS 1-9: 6
8. MOVING OR SPEAKING SO SLOWLY THAT OTHER PEOPLE COULD HAVE NOTICED. OR THE OPPOSITE, BEING SO FIGETY OR RESTLESS THAT YOU HAVE BEEN MOVING AROUND A LOT MORE THAN USUAL: 0
10. IF YOU CHECKED OFF ANY PROBLEMS, HOW DIFFICULT HAVE THESE PROBLEMS MADE IT FOR YOU TO DO YOUR WORK, TAKE CARE OF THINGS AT HOME, OR GET ALONG WITH OTHER PEOPLE: 0
6. FEELING BAD ABOUT YOURSELF - OR THAT YOU ARE A FAILURE OR HAVE LET YOURSELF OR YOUR FAMILY DOWN: 0
7. TROUBLE CONCENTRATING ON THINGS, SUCH AS READING THE NEWSPAPER OR WATCHING TELEVISION: 0
9. THOUGHTS THAT YOU WOULD BE BETTER OFF DEAD, OR OF HURTING YOURSELF: 0
2. FEELING DOWN, DEPRESSED OR HOPELESS: 0
3. TROUBLE FALLING OR STAYING ASLEEP: 3
SUM OF ALL RESPONSES TO PHQ QUESTIONS 1-9: 6
SUM OF ALL RESPONSES TO PHQ9 QUESTIONS 1 & 2: 0
SUM OF ALL RESPONSES TO PHQ QUESTIONS 1-9: 6

## 2023-01-17 ASSESSMENT — ENCOUNTER SYMPTOMS
CHEST TIGHTNESS: 0
BLOOD IN STOOL: 0
ABDOMINAL PAIN: 0
SHORTNESS OF BREATH: 0

## 2023-01-17 NOTE — LETTER
[facility]  [address] [address]  Phone: [phone]  Fax: [address], [place]         January 17, 2023     Patient: Juanjose Dawson   YOB: 1965   Date of Visit: 1/17/2023       To Whom It May Concern: It is my medical opinion that Monalisa Bruno requires a disability parking placard for the following reasons:  She cannot walk 200 feet without stopping to rest.  She has limited walking ability due to an arthritic condition. Duration of need: 1 year    If you have any questions or concerns, please don't hesitate to call.     Sincerely,        Mo Prasad, DO

## 2023-01-17 NOTE — PROGRESS NOTES
[facility]    Attending Physician Statement  I have discussed the case, including pertinent history and exam findings with the resident physician. I agree with the assessment, plan and orders as documented by the resident. I have reviewed all pertinent PMHx, PSHx, FamHx, SocialHx, medications, and allergies and updated history as appropriate. Patient presents for routine follow up of medical problems. Pt presented for one month follow up of hypertension and CHF  Goal directed therapy and lab work normal in CHF clinic. On statin and lasix 20 and BP has been remained stable, pending to add spironolactone. Needs repeat BW, need BP monitor at home setting. Requesting handicap placard in this visit. CO cough since COVID a year ago, pending pulmonology consult at this time. TD and pneumococcal vaccine today. Remainder of medical problems as per resident note.     Blaine Kaye MD  1/17/2023 11:37 AM

## 2023-01-17 NOTE — PROGRESS NOTES
Start spironolactone 25 mg daily   Stop any potassium supplements  Follow up labs (BMP) in 5 days   Anticipate starting SGLT2i

## 2023-01-17 NOTE — PROGRESS NOTES
Congestive Heart Failure [address] [facility]   1965          Referring Provider: Chary Petit  Primary Care Physician: Blake Seo  Cardiologist: Katy Crowe  Nephrologist: N/A        History of Present Illness:     Cheikh Wallace is a 59 y.o. female with a history of HFrEF, most recent EF 40-45% 11/1/2022. Patient Story:    She does  have   slight dyspnea with exertion, shortness of breath, or decline in overall functional capacity. She does  not have orthopnea, PND, nocturnal cough or hemoptysis. She does NOT have abdominal distention or bloating, early satiety, anorexia/change in appetite. She has a good response to her oral diuretic. She does not have  lower extremity edema. She denies lightheadedness, dizziness. She denies palpitations, syncope or near syncope. She does not complain of chest pain, pressure, discomfort. Admits that she may not be drinking 64 oz daily. I encouraged her to drink more. She is receptive to that.      Allergies   Allergen Reactions    Doxycycline Swelling and Other (See Comments)    Iodine Hives     IV dye    Toradol [Ketorolac Tromethamine] Anaphylaxis    Iodides Hives           Current Outpatient Medications on File Prior to Encounter   Medication Sig Dispense Refill    sacubitril-valsartan (ENTRESTO)  MG per tablet Take 1 tablet by mouth 2 times daily 60 tablet 5    carvedilol (COREG) 12.5 MG tablet Take 2 tablets by mouth 2 times daily (with meals) 60 tablet 2    furosemide (LASIX) 20 MG tablet Take 1 tablet by mouth daily 90 tablet 1    albuterol sulfate HFA (PROVENTIL;VENTOLIN;PROAIR) 108 (90 Base) MCG/ACT inhaler Inhale 2 puffs into the lungs every 6 hours as needed for Wheezing or Shortness of Breath 1 each 6    cetirizine (ZYRTEC) 10 MG tablet Take 1 tablet by mouth daily as needed for Allergies 30 tablet 4    rosuvastatin (CRESTOR) 20 MG tablet Take 1 tablet by mouth nightly 30 tablet 2    Acetaminophen 650 MG TABS Take 500 mg by mouth every 6 hours as needed for Pain 20 tablet 0     No current facility-administered medications on file prior to encounter. Guideline directed medical:  ARNI/ACE I/ARB: Yes  Beta blocker:   Yes  Aldosterone antagonist:  No  SGLT2i:  No        Physical Examination:     BP (!) 148/82   Pulse 56   Resp 18   Wt 264 lb (119.7 kg)   SpO2 96%   BMI 43.93 kg/m²     Assessment  Charting Type: Shift assessment                   Respiratory  L Breath Sounds: Clear, Diminished  R Breath Sounds: Clear, Diminished              Cardiac  Cardiac Regularity: Regular  Heart Sounds: S1, S2  Cardiac Rhythm: Sinus rhythm    Rhythm Interpretation  Heart Rate: 56         Gastrointestinal  Abdominal (WDL): Within Defined Limits               Peripheral Vascular  Peripheral Vascular (WDL): Within Defined Limits  RLE Edema: None  LLE Edema: None                   Genitourinary  Genitourinary (WDL): Within Defined Limits    Psychosocial  Psychosocial (WDL): Within Defined Limits                        Heart Rate: 56                     LAB DATA:    Last 3 BMP      Sodium (mmol/L)   Date Value   01/03/2023 139   12/20/2022 140   12/12/2022 141     Potassium (mmol/L)   Date Value   01/03/2023 4.0   12/20/2022 3.8   12/12/2022 3.9     Potassium reflex Magnesium (mmol/L)   Date Value   10/30/2022 3.8     Chloride (mmol/L)   Date Value   01/03/2023 108 (H)   12/20/2022 108 (H)   12/12/2022 108 (H)     CO2 (mmol/L)   Date Value   01/03/2023 21 (L)   12/20/2022 24   12/12/2022 25     BUN (mg/dL)   Date Value   01/03/2023 14   12/20/2022 12   12/12/2022 14     Glucose (mg/dL)   Date Value   01/03/2023 109 (H)   12/20/2022 92   12/12/2022 84   03/09/2012 91   10/05/2010 89     Calcium (mg/dL)   Date Value   01/03/2023 9.2   12/20/2022 9.4   12/12/2022 9.7       Last 3 BNP       Pro-BNP (pg/mL)   Date Value   01/03/2023 762 (H)   12/20/2022 750 (H)   12/12/2022 1,772 (H)          CBC: No results for input(s): WBC, HGB, PLT in the last 72 hours. BMP:  No results for input(s): NA, K, CL, CO2, BUN, CREATININE, GLUCOSE in the last 72 hours. Hepatic: No results for input(s): AST, ALT, ALB, BILITOT, ALKPHOS in the last 72 hours. Troponin: No results for input(s): TROPONINI in the last 72 hours. BNP: No results for input(s): BNP in the last 72 hours. Lipids: No results for input(s): CHOL, HDL in the last 72 hours. Invalid input(s): LDLCALCU  INR: No results for input(s): INR in the last 72 hours. WEIGHTS:    Wt Readings from Last 3 Encounters:   01/17/23 264 lb (119.7 kg)   01/03/23 265 lb (120.2 kg)   12/20/22 258 lb 3.2 oz (117.1 kg)         TELEMETRY:  Cardiac Regularity: Regular  Cardiac Rhythm/Interpretation: NSR/SB        ASSESSMENT:  Otis Loyd's weight is DOWN 1 LB. PT DENIES ANY DISCOMFORT. CONFIRMS WILL CALL FOR EARLIER APPT. IF NEEDED. Interventions completed this visit:  IV diuretics given no  Lab work obtained yes, BMP,BNP   Reviewed currently prescribed medications with patient, educated on importance of compliance and answered any questions regarding their medication  Educated on signs and symptoms of HF  Educated on low sodium diet    PLAN:  Scheduled to follow up in CHF clinic on   Future Appointments   Date Time Provider Yesica Bryant   1/17/2023 10:45 AM DO ROVERTO Hanley [facility]   1/27/2023 10:00 PM [facility]   1/31/2023 10:20 AM Baljit Morejon MD [address] [facility]   2/7/2023  8:30 AM [facility] CHF ROOM 1 [facility] [id number] [facility]     Given clinic phone number and aware of signs and symptoms to call with any HF change in symptoms.

## 2023-01-17 NOTE — PROGRESS NOTES
[facility] Internal Medicine      SUBJECTIVE:  Eli Verma (:  1965) is a 59 y.o. female here for evaluation of the following chief complaint(s): Other (Needs handicap placcard) and Hypertension    Ms. Yarely Pereira is a 59year old female who is here for 1 month follow up for BP. Reports subjective improvement in symptomology since prior visit- decreased LE edema. Pt was just seen at CHF clinic. Labs drawn at CHF clinic. Since last visit in office, pt seen by Dr. Debbie Crowell (cardiology) recently who increased Entresto to 97/103 BID. Pt is also on Coreg 25 mg BID, Lasix 20 mg PO daily, Rosuvastatin 20 mg PO Daily. Spoke to [facility], from [address]  clinic during appt. Start Spirolactone. Repeat Blood work 5 days on Monday. HTN  Better control with above regimen. To add Spirolactone per CHF clinic. Labs repeat on Monday. Discussed with pt. Repeat BP cuff ordered. Pt cuff not working at home. Over 8 years old. Pt to check BP at home. HFrEF EF 95% with mild systolic dysfunction   No fares of ADHF. Reports improvement in edema of LE. On GDMT- Lasix, Entresto, Coreg and now Spirolactone. On statin. Follows with CHF clinic and Dr. Debbie Crowell. Pt reports difficulty walking 200 ft (from car to office). Needs to take a break/ gets SOB quick. Request handicap placard. Also has OA. Written for 1 year. Pt brought repeat paperwork for leave from work. Already has leave signed by out office until 2023. Needs paper resigned per [employer]. I suspect she will continue to improve in the next 6 weeks. Papers signed and sent over. No change in return to work date as of right now. Long COVID cough, Suspected underlying hx of asthma. Continues to have cough post covid 1 year prior. Sleep study 2023   PFT ordered. Hold off on Pulm referral until PFT results. Pt to take Albuterol rescue inhaler prn up to 4x day. Pt cannot afford Advair, no samples available. HCM:  Mammogram - ordered   PCV-20 - 1/17/203  Tetanus - 1/17/2023   HIV, Hep C screening - ordered   Colonoscopy 3/26/2014- no abnormality; repeat 2024. Shingles vaccine- provided script for pt today     Review of Systems   Constitutional:  Negative for fatigue. Respiratory:  Negative for chest tightness and shortness of breath. Cardiovascular:  Negative for chest pain, palpitations and leg swelling. Gastrointestinal:  Negative for abdominal pain and blood in stool. Genitourinary:  Negative for dysuria. Musculoskeletal:  Negative for arthralgias. Neurological:  Negative for headaches. Psychiatric/Behavioral:  Negative for dysphoric mood. Current Outpatient Medications on File Prior to Visit   Medication Sig Dispense Refill    albuterol sulfate HFA (PROVENTIL;VENTOLIN;PROAIR) 108 (90 Base) MCG/ACT inhaler Inhale 2 puffs into the lungs every 6 hours as needed for Wheezing or Shortness of Breath 1 each 6    cetirizine (ZYRTEC) 10 MG tablet Take 1 tablet by mouth daily as needed for Allergies 30 tablet 4    Acetaminophen 650 MG TABS Take 500 mg by mouth every 6 hours as needed for Pain 20 tablet 0     No current facility-administered medications on file prior to visit. OBJECTIVE:    VS:   Vitals:    01/17/23 1051   BP: 128/74   Site: Right Lower Arm   Position: Sitting   Cuff Size: Medium Adult   Pulse: 71   Resp: 20   Temp: 97.2 °F (36.2 °C)   TempSrc: Temporal   SpO2: 94%   Weight: 264 lb (119.7 kg)   Height: 5' 5\" (1.651 m)     Physical Exam  Constitutional:       General: She is not in acute distress. Appearance: She is not ill-appearing. HENT:      Head: Normocephalic and atraumatic. Mouth/Throat:      Mouth: Mucous membranes are moist.   Eyes:      Extraocular Movements: Extraocular movements intact. Conjunctiva/sclera: Conjunctivae normal.      Pupils: Pupils are equal, round, and reactive to light.    Cardiovascular:      Rate and Rhythm: Normal rate and regular rhythm. Pulses: Normal pulses. Heart sounds: Murmur heard. No friction rub. No gallop. Comments: +Grade 2 MANISH 2nd intercostal space   Pulmonary:      Effort: Pulmonary effort is normal.      Breath sounds: Normal breath sounds. No wheezing, rhonchi or rales. Abdominal:      General: There is no distension. Tenderness: There is no abdominal tenderness. Musculoskeletal:         General: No swelling. Right lower leg: No edema. Left lower leg: No edema. Skin:     General: Skin is warm. Coloration: Skin is not jaundiced or pale. Neurological:      General: No focal deficit present. Mental Status: She is alert and oriented to person, place, and time. Motor: No weakness. ASSESSMENT/PLAN:    Hypertension- better controlled    on Entresto, Lasix 20 mg, Coreg  25  mg BID   Add Spirolactone today  Continue BP checks at home. Reordered BP cuff     HFrEF EF 49% with mild systolic dysfunction   - GDMT as above   - Will be seeing Dr. Akanksha Mayer as well 1/31/2023     All other chronic issues to be address with PCP on next visit. HCM:  Mammogram - ordered   PCV-20 - 1/17/203  Tetanus - 1/17/2023   HIV, Hep C screening - ordered   Colonoscopy 3/26/2014- no abnormality; repeat 2024. Shingles vaccine- provided script for pt today     RTC:  Return in about 5 weeks (around 2/21/2023). I have reviewed my findings and recommendations with Shaheed Colon and Dr. Hector Fournier.     Irma Knight, DO PYG-3   1/17/2023 9:01 PM

## 2023-01-17 NOTE — PROGRESS NOTES
Meg Yates, DENI - CNP   Nurse Practitioner   Specialty:  Nurse Practitioner   Progress Notes       Signed   Encounter Date:  1/17/2023                 Signed                Start spironolactone 25 mg daily   Stop any potassium supplements  Follow up labs (BMP) in 5 days   Anticipate starting SGLT2i            Spoke to pt. While pt was seeing PCP Dr. Ania Greenwood. Informed pt of above orders. Dr. Ania Greenwood also informed of orders. Also informed pt to have blood work drawn on 1/23/23. Pt repeated instructions and verbalized understanding.

## 2023-01-17 NOTE — PATIENT INSTRUCTIONS
It was a pleasure seeing you today. Start Spironolactone 25 mg daily. Lab work on Monday. We have added 2 additional screening labs. Schedule Meridee Backers will call you with appt. Please check BP at home. Ordered an automatic BP cuff.      Have a great day!     -Dr. Dione Dey

## 2023-01-17 NOTE — PLAN OF CARE
Problem: Chronic Conditions and Co-morbidities  Goal: Patient's chronic conditions and co-morbidity symptoms are monitored and maintained or improved  Flowsheets (Taken 1/17/2023 8605)  Care Plan - Patient's Chronic Conditions and Co-Morbidity Symptoms are Monitored and Maintained or Improved: Monitor and assess patient's chronic conditions and comorbid symptoms for stability, deterioration, or improvement

## 2023-01-24 ENCOUNTER — HOSPITAL ENCOUNTER (OUTPATIENT)
Dept: PULMONOLOGY | Age: 58
Discharge: HOME | End: 2023-01-24
Payer: COMMERCIAL

## 2023-01-24 ENCOUNTER — HOSPITAL ENCOUNTER (OUTPATIENT)
Age: 58
Discharge: HOME | End: 2023-01-24
Payer: COMMERCIAL

## 2023-01-24 DIAGNOSIS — J45.909 UNCOMPLICATED ASTHMA, UNSPECIFIED ASTHMA SEVERITY, UNSPECIFIED WHETHER PERSISTENT: ICD-10-CM

## 2023-01-24 DIAGNOSIS — Z11.59 NEED FOR HEPATITIS C SCREENING TEST: ICD-10-CM

## 2023-01-24 DIAGNOSIS — Z11.4 ENCOUNTER FOR SCREENING FOR HIV: ICD-10-CM

## 2023-01-24 DIAGNOSIS — U09.9 COVID-19 LONG HAULER MANIFESTING CHRONIC COUGH: ICD-10-CM

## 2023-01-24 DIAGNOSIS — R05.3 COVID-19 LONG HAULER MANIFESTING CHRONIC COUGH: ICD-10-CM

## 2023-01-24 PROCEDURE — 86803 HEPATITIS C AB TEST: CPT

## 2023-01-24 PROCEDURE — 94060 EVALUATION OF WHEEZING: CPT

## 2023-01-24 PROCEDURE — 94726 PLETHYSMOGRAPHY LUNG VOLUMES: CPT

## 2023-01-24 PROCEDURE — 36415 COLL VENOUS BLD VENIPUNCTURE: CPT

## 2023-01-24 PROCEDURE — 94729 DIFFUSING CAPACITY: CPT

## 2023-01-24 PROCEDURE — 86703 HIV-1/HIV-2 1 RESULT ANTBDY: CPT

## 2023-01-25 LAB
HEPATITIS C ANTIBODY INTERPRETATION: NORMAL
HIV-1 AND HIV-2 ANTIBODIES: NORMAL

## 2023-01-26 NOTE — PROCEDURES
Date of Exam: 1/24/2023    Pulmonary Function Test % Pred   FEV1 1.58 70   FEV1/FVC 74 LLN - 68   TLC 6.84 131   DLCO 14.51 71     No spirometric evidence of obstructive lung disease. No bronchodilator response. However there is evidence of air trapping, hyperinflation and mildly decreased diffusion capacity suggestive of emphysema.   Consider clinical correlation, CT chest.    Noah Edmondson MD MS  Pulmonary & [address] [place] [address]

## 2023-01-31 ENCOUNTER — OFFICE VISIT (OUTPATIENT)
Dept: CARDIOLOGY CLINIC | Age: 58
End: 2023-01-31
Payer: COMMERCIAL

## 2023-01-31 VITALS
WEIGHT: 263.2 LBS | SYSTOLIC BLOOD PRESSURE: 168 MMHG | DIASTOLIC BLOOD PRESSURE: 82 MMHG | HEIGHT: 65 IN | BODY MASS INDEX: 43.85 KG/M2 | HEART RATE: 77 BPM

## 2023-01-31 DIAGNOSIS — I50.43 CHF (CONGESTIVE HEART FAILURE), NYHA CLASS I, ACUTE ON CHRONIC, COMBINED (HCC): Primary | ICD-10-CM

## 2023-01-31 PROCEDURE — G8427 DOCREV CUR MEDS BY ELIG CLIN: HCPCS | Performed by: INTERNAL MEDICINE

## 2023-01-31 PROCEDURE — 1036F TOBACCO NON-USER: CPT | Performed by: INTERNAL MEDICINE

## 2023-01-31 PROCEDURE — G8417 CALC BMI ABV UP PARAM F/U: HCPCS | Performed by: INTERNAL MEDICINE

## 2023-01-31 PROCEDURE — 99214 OFFICE O/P EST MOD 30 MIN: CPT | Performed by: INTERNAL MEDICINE

## 2023-01-31 PROCEDURE — G8482 FLU IMMUNIZE ORDER/ADMIN: HCPCS | Performed by: INTERNAL MEDICINE

## 2023-01-31 PROCEDURE — 3077F SYST BP >= 140 MM HG: CPT | Performed by: INTERNAL MEDICINE

## 2023-01-31 PROCEDURE — 93000 ELECTROCARDIOGRAM COMPLETE: CPT | Performed by: INTERNAL MEDICINE

## 2023-01-31 PROCEDURE — 3079F DIAST BP 80-89 MM HG: CPT | Performed by: INTERNAL MEDICINE

## 2023-01-31 PROCEDURE — 3017F COLORECTAL CA SCREEN DOC REV: CPT | Performed by: INTERNAL MEDICINE

## 2023-01-31 RX ORDER — ISOSORBIDE DINITRATE AND HYDRALAZINE HYDROCHLORIDE 37.5; 2 MG/1; MG/1
1 TABLET ORAL 3 TIMES DAILY
Qty: 90 TABLET | Refills: 3 | Status: ON HOLD
Start: 2023-01-31 | End: 2023-02-03

## 2023-01-31 NOTE — PROGRESS NOTES
OUTPATIENT CARDIOLOGY FOLLOW-UP    Name: Michael Quinn    Age: 59 y.o. Primary Care Physician: Tuan Quiroz MD    Date of Service: 1/31/2023    Chief Complaint:   Chief Complaint   Patient presents with    Congestive Heart Failure       Interim History:   Mrs. Lizeth Nye is a 57-year-old -American female with history of mitral valve prolapse, hyperlipidemia, hypertension, bronchial asthma, morbid obesity with a BMI of 43, osteoarthritis, GERD, presented to the office for follow up visit. She was admitted to the hospital on 10/30/22 with worsening of SOB which started 3 weeks prior to presentation. Her symptoms started as MOORE and progressed to orthopnea and PND. She had also had flu like symptoms with intermittent  MS chest pain with exertion 3 weeks prior to presentation. Denies smoking/ETOH/illicit drugs. Her mother had CABG in her 46s. She had a stress test and an echo 20 years back and was told that she has mitral valve prolapse. She was seen as new consult for new onset heart failure and LV dysfunction with an EF of 45%. She was diagnosed with decompensated heart failure and poorly controlled hypertension and other co-morbid conditions including HLD, asthma, GERD and OA. She was initiated on GDMT including coreg and lisinopril . Patient was discharged home on 10/30/22 after treating for flash pulmonary edema, non ischemic cardiomyopathy and hypertensive emergency. She was also diagnosed with hypertensive emergency. She was evaluated in the emergency room on 11/10/2022 with enteritis and hematuria and was discharged home. Patient was seen in the office on 12/16/2022, since her last visit, she denies any further hospitalizations or ER visits. Now, she is compliant with medications. She does not take any over-the-counter arthritis medications. She told me that she is not very compliant with her salt intake, blood pressure still running high.   She is also noncompliant with diet and told me she eats more during periods of stress. She was recently hospitalized from 10/30/2022 to 11/2/2020 due to flash pulm edema and hypertensive emergency. She was also diagnosed a new onset LV dysfunction with an EF of 40-45% along with hyperlipidemia, influenza infection and bronchial asthma. She underwent cardiac catheterization which showed moderate obstructive CAD. Blood pressure remains elevated today's blood pressure. She told me she is compliant with medications as well as salt and fluid intake. She was scheduled for a sleep study and in January 2022 and did not go and still awaiting to get a sleep study. Recently, she underwent cardiac cath on 12/7/22 which showed moderate obstructive CAD. Sleep study was not done but will schedule it later, she had PFT which showed mild reduction in DLCO without obstruction findings suggestive of emphysema. Rodger Moris currently she has s lot of stress from social issues. Now, c/o pain on both sides of chest with radiation to back  x 2 days predominantly at rest,. Currently denies any chest pain denies any exertional chest pain. Still has MOORE     No new cardiac complaints since last cardiology evaluation except for chest pain as described above. She denies recent SOB, palpitations, lightheadedness, dizziness, syncope, PND, or orthopnea. SR on EKG.     Review of Systems:   Cardiac: As per HPI  General: No fever, chills  Pulmonary: As per HPI  HEENT: No visual disturbances, difficult swallowing  GI: No nausea, vomiting  Endocrine: No thyroid disease or DM  Musculoskeletal: SEGURA x 4, no focal motor deficits  Skin: Intact, no rashes  Neuro/Psych: No headache or seizures    Past Medical History:  Past Medical History:   Diagnosis Date    Abnormal carotid pulse 3/30/2017    Asthma     Depression     Dyslipidemia     Hypertension     Mitral valve prolapse     Obesity     Osteoarthritis        Past Surgical History:  Past Surgical History:   Procedure Laterality Date    ABDOMEN SURGERY CARDIAC CATHETERIZATION  12/07/2022    Dr. Raina Salazar (CERVIX STATUS UNKNOWN)  01/01/2000    INCISIONAL HERNIA REPAIR  04/29/2014    Dr. Weston Torrez  childhood       Family History:  Family History   Problem Relation Age of Onset    Asthma Mother     High Blood Pressure Mother     Diabetes Mother     Heart Disease Mother     Arthritis Mother     Stroke Mother     High Blood Pressure Father     Diabetes Father     Cancer Father     Mental Illness Father     Stroke Father     Asthma Sister     High Blood Pressure Sister     High Blood Pressure Sister     Mental Illness Sister     High Blood Pressure Sister     Arthritis Sister     High Blood Pressure Sister     Diabetes Sister        Social History:  Social History     Socioeconomic History    Marital status: Single     Spouse name: Not on file    Number of children: Not on file    Years of education: Not on file    Highest education level: Not on file   Occupational History    Not on file   Tobacco Use    Smoking status: Never    Smokeless tobacco: Never   Vaping Use    Vaping Use: Never used   Substance and Sexual Activity    Alcohol use: Yes     Alcohol/week: 0.0 standard drinks     Types: 2 - 3 Cans of beer per week     Comment: social    Drug use: No    Sexual activity: Never     Partners: Male   Other Topics Concern    Not on file   Social History Narrative    Not on file     Social Determinants of Health     Financial Resource Strain: High Risk    Difficulty of Paying Living Expenses: Hard   Food Insecurity: Food Insecurity Present    Worried About [facility] in the Last Year: Sometimes true    Ran Out of Food in the Last Year: Sometimes true   Transportation Needs: No Transportation Needs    Lack of Transportation (Medical): No    Lack of Transportation (Non-Medical):  No   Physical Activity: Not on file   Stress: Not on file   Social Connections: Not on file   Intimate Partner Violence: Not on file   Housing Stability: High Risk    Unable to Pay for Housing in the Last Year: Yes    Number of Places Lived in the Last Year: 1    Unstable Housing in the Last Year: No       Allergies: Allergies   Allergen Reactions    Doxycycline Swelling and Other (See Comments)    Iodine Hives     IV dye    Toradol [Ketorolac Tromethamine] Anaphylaxis    Iodides Hives       Current Medications:  Current Outpatient Medications   Medication Sig Dispense Refill    carvedilol (COREG) 25 MG tablet Take 1 tablet by mouth 2 times daily (with meals) 180 tablet 1    furosemide (LASIX) 20 MG tablet Take 1 tablet by mouth daily 90 tablet 1    rosuvastatin (CRESTOR) 20 MG tablet Take 1 tablet by mouth nightly 90 tablet 1    sacubitril-valsartan (ENTRESTO)  MG per tablet Take 1 tablet by mouth 2 times daily 180 tablet 1    spironolactone (ALDACTONE) 25 MG tablet Take 1 tablet by mouth daily 90 tablet 1    Misc. Devices MISC 1 each by Does not apply route once for 1 dose 1 automatic BP cuff 1 each 0    albuterol sulfate HFA (PROVENTIL;VENTOLIN;PROAIR) 108 (90 Base) MCG/ACT inhaler Inhale 2 puffs into the lungs every 6 hours as needed for Wheezing or Shortness of Breath 1 each 6    cetirizine (ZYRTEC) 10 MG tablet Take 1 tablet by mouth daily as needed for Allergies 30 tablet 4    Acetaminophen 650 MG TABS Take 500 mg by mouth every 6 hours as needed for Pain 20 tablet 0    zoster recombinant adjuvanted vaccine [facility]) 50 MCG/0.5ML SUSR injection Inject 0.5 mLs into the muscle See Admin Instructions 1 dose now and repeat in 2-6 months (Patient not taking: Reported on 1/31/2023) 0.5 mL 0     No current facility-administered medications for this visit.        Physical Exam:  BP (!) 168/82   Pulse 77   Ht 5' 5\" (1.651 m)   Wt 263 lb 3.2 oz (119.4 kg)   BMI 43.80 kg/m²   Wt Readings from Last 3 Encounters:   01/31/23 263 lb 3.2 oz (119.4 kg)   01/17/23 264 lb (119.7 kg)   01/17/23 264 lb (119.7 kg) Appearance: Awake, alert and oriented x 3, no acute respiratory distress, morbidly obeset. Skin: Intact, no rash  Head: Normocephalic, atraumatic  Eyes: EOMI, no conjunctival erythema  ENMT: No pharyngeal erythema, MMM, no rhinorrhea  Neck: Supple, no elevated JVP, no carotid bruits  Lungs: Clear to auscultation bilaterally. No wheezes, rales, or rhonchi.   Cardiac: Regular rate and rhythm, +S1S2, no murmurs apparent  Abdomen: Soft, nontender, +bowel sounds  Extremities: Moves all extremities x 4, no lower extremity edema  Neurologic: No focal motor deficits apparent, normal mood and affect, alert and oriented x 3  Peripheral Pulses: Intact posterior tibial pulses bilaterally    Laboratory Tests:  Lab Results   Component Value Date    CREATININE 0.8 01/17/2023    BUN 16 01/17/2023     01/17/2023    K 3.7 01/17/2023     01/17/2023    CO2 23 01/17/2023     Lab Results   Component Value Date/Time    MG 2.1 11/08/2022 10:44 PM     Lab Results   Component Value Date    WBC 4.3 (L) 12/06/2022    HGB 10.4 (L) 12/06/2022    HCT 32.3 (L) 12/06/2022    MCV 88.3 12/06/2022     12/06/2022     Lab Results   Component Value Date    ALT 15 11/08/2022    AST 17 11/08/2022    ALKPHOS 89 11/08/2022    BILITOT 0.4 11/08/2022     No results found for: CKTOTAL, CKMB, CKMBINDEX, TROPONINI  Lab Results   Component Value Date    INR 1.0 12/06/2022    INR 1.2 03/08/2015    PROTIME 11.0 12/06/2022    PROTIME 13.2 (H) 03/08/2015     Lab Results   Component Value Date    TSH 0.780 10/30/2022     Lab Results   Component Value Date    LABA1C 5.1 10/30/2022     No results found for: EAG  Lab Results   Component Value Date    CHOL 252 (H) 10/30/2022    CHOL 228 (H) 05/01/2013    CHOL 221 (H) 03/09/2012     Lab Results   Component Value Date    TRIG 59 10/30/2022    TRIG 78 05/01/2013    TRIG 82 03/09/2012     Lab Results   Component Value Date    HDL 86 10/30/2022    HDL 67.0 05/01/2013    HDL 66.0 03/09/2012     Lab Results Component Value Date    LDLCALC 154 (H) 10/30/2022    LDLCALC 145 (H) 05/01/2013    LDLCALC 139 (H) 03/09/2012     Lab Results   Component Value Date    LABVLDL 12 10/30/2022     No results found for: CHOLHDLRATIO    Cardiac Tests:  ECG: Normal sinus rhythm, LVH, nonspecific T wave changes, abnormal EKG. Echocardiogram - 10/31/22:    Left ventricle is normal in size . No regional wall motion abnormalities seen. Mildly reduced left ventricular systolic dysfunction. LVEF 45   Mildly dilated right ventricle. Mildly enlarged right atrium size. Mild aortic stenosis. Mild tricuspid regurgitation. Pulmonary hypertension is mild . TTE- 11/1/22   Mildly dilated left ventricle. Ejection fraction is visually estimated at 40-45%. Overall ejection fraction mild-to-moderately decreased . Anterior and anterolateral walls are hypokinetic. Moderate concentric left ventricular hypertrophy. Stage II diastolic dysfunction. Technically difficult examination. Definity echo contrast was used to   delineate endocardial borders. Stress test:        Cardiac catheterization 12/7/2022:   CORONARY ANATOMY:  Left main:  It is a long and short artery with no angiographic stenosis  noted. LAD:  It is a large artery wrapping around the apex and giving rise to a  large bifurcating diagonal branch and several septal perforators. There  was 30-40% discrete mid LAD stenosis. Left circumflex: It is a large artery giving rise to a very small first  obtuse marginal branch, a large trifurcating second obtuse marginal  branch, then to a smaller third obtuse marginal branch. No angiographic  stenosis noted in the circumflex or its branches. Right coronary artery: It is medium in size giving rise to a conus  branch, two RV marginal branches, a small PDA and small PLV branch. There was 50% ostial discrete PDA stenosis. IMPRESSION:  1. Mild CAD. 2.  Elevated LVEDP at 25 mmHg.   3.  Uncontrolled blood pressure. The 10-year ASCVD risk score (Travon CABALLERO, et al., 2019) is: 13.1%    Values used to calculate the score:      Age: 59 years      Sex: Female      Is Non- : Yes      Diabetic: No      Tobacco smoker: No      Systolic Blood Pressure: 259 mmHg      Is BP treated: Yes      HDL Cholesterol: 86 mg/dL      Total Cholesterol: 252 mg/dL        ASSESSMENT:  Chronic heart failure with mid range EF, euvolemic  Nonischemic cardiomyopathy  Poorly controlled hypertension, current blood pressure 168/82  New onset LV dysfunction, echo results reviewed and EF appears in the 45-50s range with hypokinesis of the inferior and inferolateral walls. HLD on statin  Bronchial asthma  Morbid obesity with possible GRACE  GERD  OA    Plan:   Blood pressure remains elevated. Continue Coreg 25 mg p.o. twice daily, Entresto to 97/103 mg  p.o. twice daily, Aldactone 25 mg po daily. Will add Bidil 20/37.5 mg po 3 times a day for LV dysfunction and for better pressure control. We will consider adding Jardiance on next follow-up. Follow low-salt diet restrict daily salt intake less than 2 g. Continue furosemide 20 mg p.o. daily and rosuvastatin 20 mg p.o. daily  Sleep study as scheduled. Patient was also recommended to cut down calories and and also walk on a regular basis for weight loss management. Follow up with primary service for abnormal lung function test.   Follow-up with me in 3 month. The patient's current medication list, allergies, problem list and results of all previously ordered testing were reviewed at today's visit.   Fiorella Watkins MD  [facility]) Cardiology

## 2023-01-31 NOTE — PATIENT INSTRUCTIONS
Blood pressure remains elevated. Continue Coreg 25 mg p.o. twice daily. Increase Entresto to 97/103 mg  p.o. twice daily, Aldactone 25 mg po daily. Will add Bidil 20/37.5 mg po 3 times a day for LV dysfunction and for better pressure control. Follow low-salt diet restrict daily salt intake less than 2 g. Continue furosemide 20 mg p.o. daily and rosuvastatin 20 mg p.o. daily  Sleep study as scheduled. Patient was also recommended to cut down calories and and also walk on a regular basis for weight loss management. Follow up with primary service for abnormal lung function test.   Follow-up with me in 3 month.

## 2023-02-03 ENCOUNTER — TELEPHONE (OUTPATIENT)
Dept: CARDIOLOGY CLINIC | Age: 58
End: 2023-02-03

## 2023-02-03 ENCOUNTER — HOSPITAL ENCOUNTER (OUTPATIENT)
Age: 58
Setting detail: OBSERVATION
Discharge: HOME | End: 2023-02-04
Attending: EMERGENCY MEDICINE | Admitting: INTERNAL MEDICINE
Payer: COMMERCIAL

## 2023-02-03 ENCOUNTER — APPOINTMENT (OUTPATIENT)
Dept: CT IMAGING | Age: 58
End: 2023-02-03
Payer: COMMERCIAL

## 2023-02-03 ENCOUNTER — APPOINTMENT (OUTPATIENT)
Dept: GENERAL RADIOLOGY | Age: 58
End: 2023-02-03
Payer: COMMERCIAL

## 2023-02-03 DIAGNOSIS — R29.90 STROKE-LIKE SYMPTOMS: Primary | ICD-10-CM

## 2023-02-03 PROBLEM — G45.9 TIA (TRANSIENT ISCHEMIC ATTACK): Status: ACTIVE | Noted: 2023-02-03

## 2023-02-03 LAB
ANION GAP SERPL CALCULATED.3IONS-SCNC: 8 MMOL/L (ref 7–16)
APTT: 27.5 SEC (ref 24.5–35.1)
BACTERIA: NORMAL /HPF
BASOPHILS ABSOLUTE: 0.03 E9/L (ref 0–0.2)
BASOPHILS RELATIVE PERCENT: 0.6 % (ref 0–2)
BILIRUBIN URINE: NEGATIVE
BLOOD, URINE: NEGATIVE
BUN BLDV-MCNC: 18 MG/DL (ref 6–20)
CALCIUM SERPL-MCNC: 9.3 MG/DL (ref 8.6–10.2)
CHLORIDE BLD-SCNC: 107 MMOL/L (ref 98–107)
CHOLESTEROL, TOTAL: 157 MG/DL (ref 0–199)
CHP ED QC CHECK: NORMAL
CLARITY: CLEAR
CO2: 25 MMOL/L (ref 22–29)
COLOR: YELLOW
CREAT SERPL-MCNC: 0.9 MG/DL (ref 0.5–1)
EKG ATRIAL RATE: 80 BPM
EKG P AXIS: 71 DEGREES
EKG P-R INTERVAL: 188 MS
EKG Q-T INTERVAL: 378 MS
EKG QRS DURATION: 92 MS
EKG QTC CALCULATION (BAZETT): 435 MS
EKG R AXIS: 38 DEGREES
EKG T AXIS: 57 DEGREES
EKG VENTRICULAR RATE: 80 BPM
EOSINOPHILS ABSOLUTE: 0.3 E9/L (ref 0.05–0.5)
EOSINOPHILS RELATIVE PERCENT: 6.1 % (ref 0–6)
GFR SERPL CREATININE-BSD FRML MDRD: >60 ML/MIN/1.73
GLUCOSE BLD-MCNC: 95 MG/DL
GLUCOSE BLD-MCNC: 99 MG/DL (ref 74–99)
GLUCOSE URINE: NEGATIVE MG/DL
HBA1C MFR BLD: 5.5 % (ref 4–5.6)
HCT VFR BLD CALC: 31.8 % (ref 34–48)
HDLC SERPL-MCNC: 66 MG/DL
HEMOGLOBIN: 10.7 G/DL (ref 11.5–15.5)
IMMATURE GRANULOCYTES #: 0.01 E9/L
IMMATURE GRANULOCYTES %: 0.2 % (ref 0–5)
INR BLD: 1
KETONES, URINE: NEGATIVE MG/DL
LDL CHOLESTEROL CALCULATED: 77 MG/DL (ref 0–99)
LEUKOCYTE ESTERASE, URINE: NEGATIVE
LYMPHOCYTES ABSOLUTE: 2.23 E9/L (ref 1.5–4)
LYMPHOCYTES RELATIVE PERCENT: 45.5 % (ref 20–42)
MCH RBC QN AUTO: 29.4 PG (ref 26–35)
MCHC RBC AUTO-ENTMCNC: 33.6 % (ref 32–34.5)
MCV RBC AUTO: 87.4 FL (ref 80–99.9)
METER GLUCOSE: 95 MG/DL (ref 74–99)
MONOCYTES ABSOLUTE: 0.43 E9/L (ref 0.1–0.95)
MONOCYTES RELATIVE PERCENT: 8.8 % (ref 2–12)
NEUTROPHILS ABSOLUTE: 1.9 E9/L (ref 1.8–7.3)
NEUTROPHILS RELATIVE PERCENT: 38.8 % (ref 43–80)
NITRITE, URINE: NEGATIVE
PDW BLD-RTO: 12.3 FL (ref 11.5–15)
PH UA: 7.5 (ref 5–9)
PLATELET # BLD: 194 E9/L (ref 130–450)
PMV BLD AUTO: 10.9 FL (ref 7–12)
POTASSIUM REFLEX MAGNESIUM: 4.1 MMOL/L (ref 3.5–5)
PROTEIN UA: NEGATIVE MG/DL
PROTHROMBIN TIME: 10.8 SEC (ref 9.3–12.4)
RBC # BLD: 3.64 E12/L (ref 3.5–5.5)
RBC UA: NORMAL /HPF (ref 0–2)
SODIUM BLD-SCNC: 140 MMOL/L (ref 132–146)
SPECIFIC GRAVITY UA: 1.01 (ref 1–1.03)
TRIGL SERPL-MCNC: 71 MG/DL (ref 0–149)
TROPONIN, HIGH SENSITIVITY: 7 NG/L (ref 0–9)
TSH SERPL DL<=0.05 MIU/L-ACNC: 1.92 UIU/ML (ref 0.27–4.2)
UROBILINOGEN, URINE: 0.2 E.U./DL
VLDLC SERPL CALC-MCNC: 14 MG/DL
WBC # BLD: 4.9 E9/L (ref 4.5–11.5)
WBC UA: NORMAL /HPF (ref 0–5)

## 2023-02-03 PROCEDURE — 96376 TX/PRO/DX INJ SAME DRUG ADON: CPT

## 2023-02-03 PROCEDURE — 70496 CT ANGIOGRAPHY HEAD: CPT

## 2023-02-03 PROCEDURE — 85730 THROMBOPLASTIN TIME PARTIAL: CPT

## 2023-02-03 PROCEDURE — G0378 HOSPITAL OBSERVATION PER HR: HCPCS

## 2023-02-03 PROCEDURE — 96374 THER/PROPH/DIAG INJ IV PUSH: CPT

## 2023-02-03 PROCEDURE — 6370000000 HC RX 637 (ALT 250 FOR IP): Performed by: STUDENT IN AN ORGANIZED HEALTH CARE EDUCATION/TRAINING PROGRAM

## 2023-02-03 PROCEDURE — 70498 CT ANGIOGRAPHY NECK: CPT

## 2023-02-03 PROCEDURE — 84484 ASSAY OF TROPONIN QUANT: CPT

## 2023-02-03 PROCEDURE — 83036 HEMOGLOBIN GLYCOSYLATED A1C: CPT

## 2023-02-03 PROCEDURE — 99285 EMERGENCY DEPT VISIT HI MDM: CPT

## 2023-02-03 PROCEDURE — 80061 LIPID PANEL: CPT

## 2023-02-03 PROCEDURE — 85025 COMPLETE CBC W/AUTO DIFF WBC: CPT

## 2023-02-03 PROCEDURE — 81001 URINALYSIS AUTO W/SCOPE: CPT

## 2023-02-03 PROCEDURE — 80048 BASIC METABOLIC PNL TOTAL CA: CPT

## 2023-02-03 PROCEDURE — 85610 PROTHROMBIN TIME: CPT

## 2023-02-03 PROCEDURE — 2500000003 HC RX 250 WO HCPCS

## 2023-02-03 PROCEDURE — 71045 X-RAY EXAM CHEST 1 VIEW: CPT

## 2023-02-03 PROCEDURE — 96372 THER/PROPH/DIAG INJ SC/IM: CPT

## 2023-02-03 PROCEDURE — 96375 TX/PRO/DX INJ NEW DRUG ADDON: CPT

## 2023-02-03 PROCEDURE — 0042T CT BRAIN PERFUSION: CPT

## 2023-02-03 PROCEDURE — 93010 ELECTROCARDIOGRAM REPORT: CPT | Performed by: INTERNAL MEDICINE

## 2023-02-03 PROCEDURE — 6360000002 HC RX W HCPCS: Performed by: STUDENT IN AN ORGANIZED HEALTH CARE EDUCATION/TRAINING PROGRAM

## 2023-02-03 PROCEDURE — 84443 ASSAY THYROID STIM HORMONE: CPT

## 2023-02-03 PROCEDURE — 6360000002 HC RX W HCPCS

## 2023-02-03 PROCEDURE — 93005 ELECTROCARDIOGRAM TRACING: CPT | Performed by: EMERGENCY MEDICINE

## 2023-02-03 PROCEDURE — 6370000000 HC RX 637 (ALT 250 FOR IP)

## 2023-02-03 PROCEDURE — 82962 GLUCOSE BLOOD TEST: CPT

## 2023-02-03 PROCEDURE — 70450 CT HEAD/BRAIN W/O DYE: CPT

## 2023-02-03 PROCEDURE — 6360000004 HC RX CONTRAST MEDICATION: Performed by: RADIOLOGY

## 2023-02-03 PROCEDURE — 2580000003 HC RX 258

## 2023-02-03 PROCEDURE — 36415 COLL VENOUS BLD VENIPUNCTURE: CPT

## 2023-02-03 PROCEDURE — 6360000002 HC RX W HCPCS: Performed by: EMERGENCY MEDICINE

## 2023-02-03 RX ORDER — ROSUVASTATIN CALCIUM 20 MG/1
20 TABLET, COATED ORAL NIGHTLY
Status: DISCONTINUED | OUTPATIENT
Start: 2023-02-03 | End: 2023-02-03

## 2023-02-03 RX ORDER — POLYETHYLENE GLYCOL 3350 17 G/17G
17 POWDER, FOR SOLUTION ORAL DAILY PRN
Status: DISCONTINUED | OUTPATIENT
Start: 2023-02-03 | End: 2023-02-04 | Stop reason: HOSPADM

## 2023-02-03 RX ORDER — SODIUM CHLORIDE 9 MG/ML
INJECTION, SOLUTION INTRAVENOUS PRN
Status: DISCONTINUED | OUTPATIENT
Start: 2023-02-03 | End: 2023-02-04 | Stop reason: HOSPADM

## 2023-02-03 RX ORDER — HYDROXYZINE HYDROCHLORIDE 10 MG/1
10 TABLET, FILM COATED ORAL ONCE
Status: COMPLETED | OUTPATIENT
Start: 2023-02-03 | End: 2023-02-03

## 2023-02-03 RX ORDER — ACETAMINOPHEN 325 MG/1
650 TABLET ORAL EVERY 6 HOURS PRN
Status: DISCONTINUED | OUTPATIENT
Start: 2023-02-03 | End: 2023-02-04 | Stop reason: HOSPADM

## 2023-02-03 RX ORDER — CLOPIDOGREL 300 MG/1
300 TABLET, FILM COATED ORAL ONCE
Status: COMPLETED | OUTPATIENT
Start: 2023-02-03 | End: 2023-02-03

## 2023-02-03 RX ORDER — CLOPIDOGREL BISULFATE 75 MG/1
75 TABLET ORAL DAILY
Status: DISCONTINUED | OUTPATIENT
Start: 2023-02-04 | End: 2023-02-04 | Stop reason: HOSPADM

## 2023-02-03 RX ORDER — CETIRIZINE HYDROCHLORIDE 10 MG/1
10 TABLET ORAL DAILY PRN
Status: DISCONTINUED | OUTPATIENT
Start: 2023-02-03 | End: 2023-02-04 | Stop reason: HOSPADM

## 2023-02-03 RX ORDER — DIPHENHYDRAMINE HYDROCHLORIDE 50 MG/ML
50 INJECTION INTRAMUSCULAR; INTRAVENOUS ONCE
Status: COMPLETED | OUTPATIENT
Start: 2023-02-03 | End: 2023-02-03

## 2023-02-03 RX ORDER — ACETAMINOPHEN 650 MG/1
650 SUPPOSITORY RECTAL EVERY 6 HOURS PRN
Status: DISCONTINUED | OUTPATIENT
Start: 2023-02-03 | End: 2023-02-04 | Stop reason: HOSPADM

## 2023-02-03 RX ORDER — ONDANSETRON 2 MG/ML
4 INJECTION INTRAMUSCULAR; INTRAVENOUS EVERY 6 HOURS PRN
Status: DISCONTINUED | OUTPATIENT
Start: 2023-02-03 | End: 2023-02-04 | Stop reason: HOSPADM

## 2023-02-03 RX ORDER — LABETALOL HYDROCHLORIDE 5 MG/ML
10 INJECTION, SOLUTION INTRAVENOUS EVERY 6 HOURS PRN
Status: DISCONTINUED | OUTPATIENT
Start: 2023-02-03 | End: 2023-02-04

## 2023-02-03 RX ORDER — SODIUM CHLORIDE 0.9 % (FLUSH) 0.9 %
5-40 SYRINGE (ML) INJECTION EVERY 12 HOURS SCHEDULED
Status: DISCONTINUED | OUTPATIENT
Start: 2023-02-03 | End: 2023-02-04 | Stop reason: HOSPADM

## 2023-02-03 RX ORDER — DIPHENHYDRAMINE HYDROCHLORIDE 50 MG/ML
25 INJECTION INTRAMUSCULAR; INTRAVENOUS ONCE
Status: DISCONTINUED | OUTPATIENT
Start: 2023-02-03 | End: 2023-02-03

## 2023-02-03 RX ORDER — ASPIRIN 81 MG/1
324 TABLET, CHEWABLE ORAL ONCE
Status: COMPLETED | OUTPATIENT
Start: 2023-02-03 | End: 2023-02-03

## 2023-02-03 RX ORDER — SODIUM CHLORIDE 0.9 % (FLUSH) 0.9 %
10 SYRINGE (ML) INJECTION
Status: ACTIVE | OUTPATIENT
Start: 2023-02-03 | End: 2023-02-04

## 2023-02-03 RX ORDER — SPIRONOLACTONE 25 MG/1
25 TABLET ORAL DAILY
Status: DISCONTINUED | OUTPATIENT
Start: 2023-02-03 | End: 2023-02-04 | Stop reason: HOSPADM

## 2023-02-03 RX ORDER — ONDANSETRON 4 MG/1
4 TABLET, ORALLY DISINTEGRATING ORAL EVERY 8 HOURS PRN
Status: DISCONTINUED | OUTPATIENT
Start: 2023-02-03 | End: 2023-02-04 | Stop reason: HOSPADM

## 2023-02-03 RX ORDER — CARVEDILOL 25 MG/1
25 TABLET ORAL 2 TIMES DAILY WITH MEALS
Status: DISCONTINUED | OUTPATIENT
Start: 2023-02-03 | End: 2023-02-04 | Stop reason: HOSPADM

## 2023-02-03 RX ORDER — ROSUVASTATIN CALCIUM 20 MG/1
40 TABLET, COATED ORAL NIGHTLY
Status: DISCONTINUED | OUTPATIENT
Start: 2023-02-03 | End: 2023-02-04 | Stop reason: HOSPADM

## 2023-02-03 RX ORDER — ALBUTEROL SULFATE 2.5 MG/3ML
2.5 SOLUTION RESPIRATORY (INHALATION) EVERY 6 HOURS PRN
Status: DISCONTINUED | OUTPATIENT
Start: 2023-02-03 | End: 2023-02-04 | Stop reason: HOSPADM

## 2023-02-03 RX ORDER — LABETALOL HYDROCHLORIDE 5 MG/ML
5 INJECTION, SOLUTION INTRAVENOUS ONCE
Status: COMPLETED | OUTPATIENT
Start: 2023-02-03 | End: 2023-02-03

## 2023-02-03 RX ORDER — FUROSEMIDE 40 MG/1
20 TABLET ORAL DAILY
Status: DISCONTINUED | OUTPATIENT
Start: 2023-02-03 | End: 2023-02-04 | Stop reason: HOSPADM

## 2023-02-03 RX ORDER — LORAZEPAM 2 MG/ML
1 INJECTION INTRAMUSCULAR ONCE
Status: COMPLETED | OUTPATIENT
Start: 2023-02-03 | End: 2023-02-04

## 2023-02-03 RX ORDER — ASPIRIN 81 MG/1
81 TABLET, CHEWABLE ORAL DAILY
Status: DISCONTINUED | OUTPATIENT
Start: 2023-02-04 | End: 2023-02-04 | Stop reason: HOSPADM

## 2023-02-03 RX ORDER — SODIUM CHLORIDE 0.9 % (FLUSH) 0.9 %
5-40 SYRINGE (ML) INJECTION PRN
Status: DISCONTINUED | OUTPATIENT
Start: 2023-02-03 | End: 2023-02-04 | Stop reason: HOSPADM

## 2023-02-03 RX ORDER — METHYLPREDNISOLONE SODIUM SUCCINATE 125 MG/2ML
125 INJECTION, POWDER, LYOPHILIZED, FOR SOLUTION INTRAMUSCULAR; INTRAVENOUS ONCE
Status: COMPLETED | OUTPATIENT
Start: 2023-02-03 | End: 2023-02-03

## 2023-02-03 RX ORDER — ENOXAPARIN SODIUM 100 MG/ML
30 INJECTION SUBCUTANEOUS 2 TIMES DAILY
Status: DISCONTINUED | OUTPATIENT
Start: 2023-02-03 | End: 2023-02-04 | Stop reason: HOSPADM

## 2023-02-03 RX ADMIN — SPIRONOLACTONE 25 MG: 25 TABLET ORAL at 21:10

## 2023-02-03 RX ADMIN — IOPAMIDOL 105 ML: 755 INJECTION, SOLUTION INTRAVENOUS at 10:18

## 2023-02-03 RX ADMIN — DIPHENHYDRAMINE HYDROCHLORIDE 50 MG: 50 INJECTION, SOLUTION INTRAMUSCULAR; INTRAVENOUS at 10:15

## 2023-02-03 RX ADMIN — FUROSEMIDE 20 MG: 40 TABLET ORAL at 16:27

## 2023-02-03 RX ADMIN — SACUBITRIL AND VALSARTAN 1 TABLET: 97; 103 TABLET, FILM COATED ORAL at 19:20

## 2023-02-03 RX ADMIN — ACETAMINOPHEN 650 MG: 325 TABLET ORAL at 15:01

## 2023-02-03 RX ADMIN — CLOPIDOGREL BISULFATE 300 MG: 300 TABLET, FILM COATED ORAL at 11:02

## 2023-02-03 RX ADMIN — ENOXAPARIN SODIUM 30 MG: 100 INJECTION SUBCUTANEOUS at 21:10

## 2023-02-03 RX ADMIN — HYDROXYZINE HYDROCHLORIDE 10 MG: 10 TABLET, FILM COATED ORAL at 20:10

## 2023-02-03 RX ADMIN — LABETALOL HYDROCHLORIDE 5 MG: 5 INJECTION, SOLUTION INTRAVENOUS at 19:19

## 2023-02-03 RX ADMIN — SODIUM CHLORIDE, PRESERVATIVE FREE 10 ML: 5 INJECTION INTRAVENOUS at 19:20

## 2023-02-03 RX ADMIN — ROSUVASTATIN CALCIUM 40 MG: 20 TABLET, FILM COATED ORAL at 21:10

## 2023-02-03 RX ADMIN — CARVEDILOL 25 MG: 25 TABLET, FILM COATED ORAL at 16:28

## 2023-02-03 RX ADMIN — CETIRIZINE HYDROCHLORIDE 10 MG: 10 TABLET, FILM COATED ORAL at 20:10

## 2023-02-03 RX ADMIN — ENOXAPARIN SODIUM 30 MG: 100 INJECTION SUBCUTANEOUS at 14:26

## 2023-02-03 RX ADMIN — ASPIRIN 81 MG CHEWABLE TABLET 324 MG: 81 TABLET CHEWABLE at 11:02

## 2023-02-03 RX ADMIN — METHYLPREDNISOLONE SODIUM SUCCINATE 125 MG: 125 INJECTION, POWDER, FOR SOLUTION INTRAMUSCULAR; INTRAVENOUS at 10:16

## 2023-02-03 ASSESSMENT — PAIN DESCRIPTION - ORIENTATION: ORIENTATION: RIGHT

## 2023-02-03 ASSESSMENT — ENCOUNTER SYMPTOMS
SINUS PRESSURE: 0
COUGH: 0
EYE DISCHARGE: 0
ABDOMINAL PAIN: 0
SHORTNESS OF BREATH: 0
EYE PAIN: 0
BACK PAIN: 0
SORE THROAT: 0
DIARRHEA: 0
VOMITING: 0
CHEST TIGHTNESS: 0
SHORTNESS OF BREATH: 1
WHEEZING: 0
ABDOMINAL DISTENTION: 0
EYE REDNESS: 0
NAUSEA: 0

## 2023-02-03 ASSESSMENT — PAIN SCALES - GENERAL
PAINLEVEL_OUTOF10: 5

## 2023-02-03 ASSESSMENT — PAIN DESCRIPTION - DESCRIPTORS
DESCRIPTORS: ACHING;DULL
DESCRIPTORS: ACHING;DULL
DESCRIPTORS: SORE

## 2023-02-03 ASSESSMENT — PAIN DESCRIPTION - LOCATION
LOCATION: HEAD
LOCATION: HEAD
LOCATION: ARM

## 2023-02-03 ASSESSMENT — PAIN - FUNCTIONAL ASSESSMENT: PAIN_FUNCTIONAL_ASSESSMENT: NONE - DENIES PAIN

## 2023-02-03 NOTE — CARE COORDINATION
Social Work Discharge Planning:  SW met wit patient for initial assessment. Patient lives daughter in a 2 story home. Prior to admission patient was independent. She has no hx with [facility]. PCP is Akhil Minor at [facility]. Patient daughters will transport home. SW will continue to follow and assist with transition of care.  Electronically signed by FRDEO Simmons on 2/3/2023 at 6:37 PM

## 2023-02-03 NOTE — TELEPHONE ENCOUNTER
Patient notified of Dr. Carleen amado. She is actually in the hospital now with stroke-like symptoms and it appears they do not feel her SOB was related to the BiDil. ER note documents:  \"59year-old female presents emergency department states she took a new blood pressure medication this morning. She states 4 to 5 minutes after that around 845 to 9:00 she started having some trouble with her speech, states she is have a hard time getting her speech out. And now she is stating she is having some sensation deficit on the right side of her body. \"

## 2023-02-03 NOTE — ED NOTES
Time Neurologist page:[phone] norman      Time Stroke Alert called:1003  :    Time Neurologist called back:1007 norman    X-Ray/CT notified: [id number] [facility]  02/03/23 1007

## 2023-02-03 NOTE — ED PROVIDER NOTES
The history is provided by the patient and medical records. 64-year-old female presents emergency department states she took a new blood pressure medication this morning. She states 4 to 5 minutes after that around 845 to 9:00 she started having some trouble with her speech, states she is have a hard time getting her speech out. And now she is stating she is having some sensation deficit on the right side of her body. She denies any slurred speech denies any weakness to any extremity. Otherwise no other acute complaints at this time. Denies any chest pain or abdominal pain denies any shortness of breath. Review of Systems   Constitutional:  Negative for chills and fever. HENT:  Negative for ear pain, sinus pressure and sore throat. Eyes:  Negative for pain, discharge and redness. Respiratory:  Negative for cough, shortness of breath and wheezing. Cardiovascular:  Negative for chest pain. Gastrointestinal:  Negative for abdominal distention, diarrhea, nausea and vomiting. Genitourinary:  Negative for dysuria and frequency. Musculoskeletal:  Negative for arthralgias and back pain. Skin:  Negative for rash and wound. Neurological:  Positive for numbness. Negative for weakness and headaches. Aphasia   Hematological:  Negative for adenopathy. All other systems reviewed and are negative. Physical Exam  Vitals and nursing note reviewed. Constitutional:       Appearance: Normal appearance. She is normal weight. HENT:      Head: Normocephalic and atraumatic. Eyes:      Conjunctiva/sclera: Conjunctivae normal.   Cardiovascular:      Rate and Rhythm: Normal rate and regular rhythm. Pulses: Normal pulses. Heart sounds: Normal heart sounds. No murmur heard. No gallop. Pulmonary:      Effort: Pulmonary effort is normal. No respiratory distress. Breath sounds: Normal breath sounds. No wheezing or rales. Abdominal:      General: Abdomen is flat.  Bowel sounds are normal. There is no distension. Palpations: Abdomen is soft. Tenderness: There is no abdominal tenderness. There is no guarding. Skin:     General: Skin is warm and dry. Capillary Refill: Capillary refill takes less than 2 seconds. Neurological:      Mental Status: She is alert and oriented to person, place, and time. Comments: Patient with some mild aphasia as well as right-sided sensation deficit. NIH Stroke Scale at time of initial evaluation: 1005  1A: Level of Consciousness 0 - alert; keenly responsive   1B: Ask Month and Age 0 - answers both questions correctly   1C: Tell Patient To Open and Close Eyes, then Hand  Squeeze 0 - performs both tasks correctly   2: Test Horizontal Extraocular Movements 0 - normal   3: Test Visual Fields 0 - no visual loss   4: Test Facial Palsy 0 - normal symmetric movement   5A: Test Left Arm Motor Drift 0 - no drift, limb holds 90 (or 45) degrees for full 10 seconds   5B: Test Right Arm Motor Drift 0 - no drift, limb holds 90 (or 45) degrees for full 10 seconds   6A: Test Left Leg Motor Drift 0 - no drift; leg holds 30 degree position for full 5 seconds   6B: Test Right Leg Motor Drift 0 - no drift; leg holds 30 degree position for full 5 seconds   7: Test Limb Ataxia   (FNF/Heel-Shin) 0 - absent   8: Test Sensation 1 - mild to moderate sensory loss; patient feels pinprick is less sharp or is dull on the affected side; there is a loss of superficial pain with pinprick but patient is aware of being touched    9: Test Language/Aphasia 1 - mild to moderate aphasia; some obvious loss of fluency or facility of comprehension without significant limitation on ideas expressed or form of expression. Reduction of speech and/or comprehension, however, makes conversation about provided materials difficult or impossible.   For example, in conversation about provided materials, examiner can identify picture or naming card content from patient's response. 10: Test Dysarthria 0 - normal   11: Test Extinction/Inattention 0 - no abnormality   Total 2       Procedures     MDM     Amount and/or Complexity of Data Reviewed  Clinical lab tests: reviewed  Tests in the radiology section of CPT®: reviewed           Diagnostic results    LABS:    Labs Reviewed   CBC WITH AUTO DIFFERENTIAL - Abnormal; Notable for the following components:       Result Value    Hemoglobin 10.7 (*)     Hematocrit 31.8 (*)     Neutrophils % 38.8 (*)     Lymphocytes % 45.5 (*)     Eosinophils % 6.1 (*)     All other components within normal limits   POCT GLUCOSE - Normal   BASIC METABOLIC PANEL W/ REFLEX TO MG FOR LOW K   TROPONIN   PROTIME-INR   APTT   URINALYSIS WITH MICROSCOPIC   LIPID PANEL   HEMOGLOBIN A1C   TSH   POCT GLUCOSE       As interpreted by me, the above displayed labs are abnormal. All other labs obtained during this visit were within normal range or not returned as of this dictation. EKG Interpretation  Interpreted by emergency department physician, Andria Gray MD      See ED course        RADIOLOGY:   Non-plain film images such as CT, Ultrasound and MRI are read by the radiologist. Plain radiographic images are visualized and preliminarily interpreted by the ED Provider with the below findings:    See ED course    Interpretation per the Radiologist below, if available at the time of this note:    XR CHEST PORTABLE   Final Result   1. There is no acute cardiopulmonary disease. CT HEAD WO CONTRAST   Final Result   1. No acute intracranial abnormality on noncontrast head CT. 2. No perfusion mismatch. 3. Unremarkable CTA of the head and neck. CTA NECK W CONTRAST   Final Result   1. No acute intracranial abnormality on noncontrast head CT. 2. No perfusion mismatch. 3. Unremarkable CTA of the head and neck. CTA HEAD W CONTRAST   Final Result   1. No acute intracranial abnormality on noncontrast head CT. 2. No perfusion mismatch.    3. Unremarkable CTA of the head and neck. CT BRAIN PERFUSION   Final Result   1. No acute intracranial abnormality on noncontrast head CT. 2. No perfusion mismatch. 3. Unremarkable CTA of the head and neck. MRI BRAIN WO CONTRAST    (Results Pending)     No results found. No results found. MDM    History From: the patient    CONSULTS: (Who and What was discussed)  IP CONSULT TO INTERNAL MEDICINE  IP CONSULT TO NEUROLOGY  Interventional neurology-they advised on no TNKase no LVO or perfusion mismatch noted on imaging. Advised on aspirin Plavix and admission to the hospital for MRIs. Social Determinants of Health : None    Chronic Conditions affecting care:    has a past medical history of Abnormal carotid pulse (3/30/2017), Asthma, Depression, Dyslipidemia, Hypertension, Mitral valve prolapse, Obesity, and Osteoarthritis. Records Reviewed( Source)     CC/HPI Summary, DDx, ED Course, and Reassessment:   Differential diagnosis including but not limited to stroke, TIA  61-year-old female presented with complaint of trouble with her speech falling after taking a blood pressure medication earlier today. Denies any shortness of breath or chest pain. She is noted to have an NIH score of 2 based on she does have sensation deficit of the right arm and right leg as well as mild aphasia has trouble getting her words out. Due to this a stroke alert was called. Did speak with our interventional neurologist they did review the films no large LVO and no perfusion mismatch did not advise on TNKase due to the patient's low NIH score and based on her symptoms. Her speech is improving at this time. Dispo the patient on aspirin and Plavix per interventional neurology. Patient's EKG was stable no signs of ischemia. Remaining laboratory work-up was reassuring. Urinalysis no signs of infection, BMP and CBC within normal limits troponin was not elevated glucose was stable at 95.   Did speak with hospitalist who is agreeable with admission at this time. Disposition Considerations (Tests not ordered but considered, Shared Decision Making, Pt Expectation of Test or Tx.): Upon shared decision making patient be admitted to the hospital at this time for her strokelike symptoms. She is agreeable this plan.       Medications   sodium chloride flush 0.9 % injection 10 mL (has no administration in time range)   sodium chloride flush 0.9 % injection 5-40 mL (has no administration in time range)   sodium chloride flush 0.9 % injection 5-40 mL (has no administration in time range)   0.9 % sodium chloride infusion (has no administration in time range)   enoxaparin Sodium (LOVENOX) injection 30 mg (has no administration in time range)   ondansetron (ZOFRAN-ODT) disintegrating tablet 4 mg (has no administration in time range)     Or   ondansetron (ZOFRAN) injection 4 mg (has no administration in time range)   polyethylene glycol (GLYCOLAX) packet 17 g (has no administration in time range)   acetaminophen (TYLENOL) tablet 650 mg (has no administration in time range)     Or   acetaminophen (TYLENOL) suppository 650 mg (has no administration in time range)   aspirin chewable tablet 81 mg (has no administration in time range)   clopidogrel (PLAVIX) tablet 75 mg (has no administration in time range)   methylPREDNISolone sodium (SOLU-MEDROL) injection 125 mg (125 mg IntraVENous Given 2/3/23 1016)   diphenhydrAMINE (BENADRYL) injection 50 mg (50 mg IntraVENous Given 2/3/23 1015)   iopamidol (ISOVUE-370) 76 % injection 105 mL (105 mLs IntraVENous Given 2/3/23 1018)   clopidogrel (PLAVIX) tablet 300 mg (300 mg Oral Given 2/3/23 1102)   aspirin chewable tablet 324 mg (324 mg Oral Given 2/3/23 1102)         I am the primary provider of record      ED Course as of 02/03/23 1421   Fri Feb 03, 2023   1001 EKG Interpretation  Interpreted by emergency department physician, Dr. Darrel Davies     2/3/23  Time: 5957    Rhythm: normal sinus Rate: normal  Axis: normal  Conduction: normal  ST Segments: no acute change  T Waves: no acute change    Clinical Impression: Sinus rhythm, no acute ischemic changes    Comparison to Old EKG  Stable from prior EKG       [CD]   1050 Spoke with interventional neurology due to the patient's NIH score of 2, no perfusion mismatch no large vessel occlusion patient does not meet TNKase requirements at this time. [CB]      ED Course User Index  [CB] Dev Nair MD  [CD] Charles Gordillo MD       ED Course as of 23 1421      1001 EKG Interpretation  Interpreted by emergency department physician, Dr. Ricardo Begum     2/3/23  Time: 1399    Rhythm: normal sinus   Rate: normal  Axis: normal  Conduction: normal  ST Segments: no acute change  T Waves: no acute change    Clinical Impression: Sinus rhythm, no acute ischemic changes    Comparison to Old EKG  Stable from prior EKG       [CD]   1050 Spoke with interventional neurology due to the patient's NIH score of 2, no perfusion mismatch no large vessel occlusion patient does not meet TNKase requirements at this time. [CB]      ED Course User Index  [CB] Dev Nair MD  [CD] Charles Gordillo MD       --------------------------------------------- PAST HISTORY ---------------------------------------------  Past Medical History:  has a past medical history of Abnormal carotid pulse, Asthma, Depression, Dyslipidemia, Hypertension, Mitral valve prolapse, Obesity, and Osteoarthritis. Past Surgical History:  has a past surgical history that includes Hysterectomy (2000); knee surgery; Tonsillectomy (childhood);  section; Incisional hernia repair (2014); Abdomen surgery; and Cardiac catheterization (2022). Social History:  reports that she has never smoked. She has never used smokeless tobacco. She reports current alcohol use. She reports that she does not use drugs.     Family History: family history includes Arthritis in her mother and sister; Asthma in her mother and sister; Cancer in her father; Diabetes in her father, mother, and sister; Heart Disease in her mother; High Blood Pressure in her father, mother, sister, sister, sister, and sister; Mental Illness in her father and sister; Stroke in her father and mother. The patients home medications have been reviewed.     Allergies: Doxycycline, Iodine, Toradol [ketorolac tromethamine], Bidil [isosorb dinitrate-hydralazine], and Iodides    -------------------------------------------------- RESULTS -------------------------------------------------    LABS:  Results for orders placed or performed during the hospital encounter of 02/03/23   CBC with Auto Differential   Result Value Ref Range    WBC 4.9 4.5 - 11.5 E9/L    RBC 3.64 3.50 - 5.50 E12/L    Hemoglobin 10.7 (L) 11.5 - 15.5 g/dL    Hematocrit 31.8 (L) 34.0 - 48.0 %    MCV 87.4 80.0 - 99.9 fL    MCH 29.4 26.0 - 35.0 pg    MCHC 33.6 32.0 - 34.5 %    RDW 12.3 11.5 - 15.0 fL    Platelets 858 586 - 340 E9/L    MPV 10.9 7.0 - 12.0 fL    Neutrophils % 38.8 (L) 43.0 - 80.0 %    Immature Granulocytes % 0.2 0.0 - 5.0 %    Lymphocytes % 45.5 (H) 20.0 - 42.0 %    Monocytes % 8.8 2.0 - 12.0 %    Eosinophils % 6.1 (H) 0.0 - 6.0 %    Basophils % 0.6 0.0 - 2.0 %    Neutrophils Absolute 1.90 1.80 - 7.30 E9/L    Immature Granulocytes # 0.01 E9/L    Lymphocytes Absolute 2.23 1.50 - 4.00 E9/L    Monocytes Absolute 0.43 0.10 - 0.95 E9/L    Eosinophils Absolute 0.30 0.05 - 0.50 E9/L    Basophils Absolute 0.03 0.00 - 0.20 O2/L   Basic Metabolic Panel w/ Reflex to MG   Result Value Ref Range    Sodium 140 132 - 146 mmol/L    Potassium reflex Magnesium 4.1 3.5 - 5.0 mmol/L    Chloride 107 98 - 107 mmol/L    CO2 25 22 - 29 mmol/L    Anion Gap 8 7 - 16 mmol/L    Glucose 99 74 - 99 mg/dL    BUN 18 6 - 20 mg/dL    Creatinine 0.9 0.5 - 1.0 mg/dL    Est, Glom Filt Rate >60 >=60 mL/min/1.73    Calcium 9.3 8.6 - 10.2 mg/dL   Troponin   Result Value Ref Range Troponin, High Sensitivity 7 0 - 9 ng/L   Protime-INR   Result Value Ref Range    Protime 10.8 9.3 - 12.4 sec    INR 1.0    APTT   Result Value Ref Range    aPTT 27.5 24.5 - 35.1 sec   Urinalysis with Microscopic   Result Value Ref Range    Color, UA Yellow Straw/Yellow    Clarity, UA Clear Clear    Glucose, Ur Negative Negative mg/dL    Bilirubin Urine Negative Negative    Ketones, Urine Negative Negative mg/dL    Specific Gravity, UA 1.015 1.005 - 1.030    Blood, Urine Negative Negative    pH, UA 7.5 5.0 - 9.0    Protein, UA Negative Negative mg/dL    Urobilinogen, Urine 0.2 <2.0 E.U./dL    Nitrite, Urine Negative Negative    Leukocyte Esterase, Urine Negative Negative    WBC, UA NONE 0 - 5 /HPF    RBC, UA NONE 0 - 2 /HPF    Bacteria, UA NONE SEEN None Seen /HPF   POCT Glucose   Result Value Ref Range    Meter Glucose 95 74 - 99 mg/dL   POCT Glucose   Result Value Ref Range    Glucose 95 mg/dL    QC OK? ok    EKG 12 Lead   Result Value Ref Range    Ventricular Rate 80 BPM    Atrial Rate 80 BPM    P-R Interval 188 ms    QRS Duration 92 ms    Q-T Interval 378 ms    QTc Calculation (Bazett) 435 ms    P Axis 71 degrees    R Axis 38 degrees    T Axis 57 degrees       RADIOLOGY:  XR CHEST PORTABLE   Final Result   1. There is no acute cardiopulmonary disease. CT HEAD WO CONTRAST   Final Result   1. No acute intracranial abnormality on noncontrast head CT. 2. No perfusion mismatch. 3. Unremarkable CTA of the head and neck. CTA NECK W CONTRAST   Final Result   1. No acute intracranial abnormality on noncontrast head CT. 2. No perfusion mismatch. 3. Unremarkable CTA of the head and neck. CTA HEAD W CONTRAST   Final Result   1. No acute intracranial abnormality on noncontrast head CT. 2. No perfusion mismatch. 3. Unremarkable CTA of the head and neck. CT BRAIN PERFUSION   Final Result   1. No acute intracranial abnormality on noncontrast head CT. 2. No perfusion mismatch.    3. Unremarkable CTA of the head and neck. MRI BRAIN WO CONTRAST    (Results Pending)           ------------------------- NURSING NOTES AND VITALS REVIEWED ---------------------------  Date / Time Roomed:  2/3/2023  9:47 AM  ED Bed Assignment:  [id number]    The nursing notes within the ED encounter and vital signs as below have been reviewed. Patient Vitals for the past 24 hrs:   BP Temp Temp src Pulse Resp SpO2 Height Weight   02/03/23 1252 -- -- -- -- -- -- 5' 5\" (1.651 m) 260 lb (117.9 kg)   02/03/23 1226 (!) 156/92 97.4 °F (36.3 °C) Temporal 78 18 97 % -- --   02/03/23 1115 -- -- -- 74 23 98 % -- --   02/03/23 1053 (!) 161/86 -- -- 81 16 98 % -- --   02/03/23 0944 (!) 168/106 -- -- -- -- -- -- 263 lb (119.3 kg)   02/03/23 0930 -- 97.6 °F (36.4 °C) -- 94 23 100 % -- --       Oxygen Saturation Interpretation: Normal    ------------------------------------------ PROGRESS NOTES ------------------------------------------  Re-evaluation(s):  Time: 1100  Patients symptoms show no change  Repeat physical examination is not changed    Counseling:  I have spoken with the patient and discussed todays results, in addition to providing specific details for the plan of care and counseling regarding the diagnosis and prognosis. Their questions are answered at this time and they are agreeable with the plan of admission.    --------------------------------- ADDITIONAL PROVIDER NOTES ---------------------------------  Consultations:   Spoke with hospitalist.  Discussed case. They will admit the patient. This patient's ED course included: a personal history and physicial examination, re-evaluation prior to disposition, multiple bedside re-evaluations, IV medications, cardiac monitoring, and continuous pulse oximetry    This patient has remained hemodynamically stable during their ED course. Diagnosis:  1.  Stroke-like symptoms        Disposition:  Patient's disposition: Admit to telemetry  Patient's condition is stable.          Claudette Dudley MD  Resident  02/03/23 4924

## 2023-02-03 NOTE — TELEPHONE ENCOUNTER
Patient called stating she started Bidil today and is having shortness of breath. She does not wish to take it. Please advise.

## 2023-02-03 NOTE — ED NOTES
Patient from [facility] states she took new BP med at 0800 at 0900 she started having SOB, dizziness and lightheaded.       Ludwig Hayward RN  02/03/23 1015

## 2023-02-03 NOTE — ED PROVIDER NOTES
ATTENDING PROVIDER ATTESTATION:     Azeb Carroll presented to the emergency department for evaluation of Shortness of Breath (And lightheaded, dizzy, started new med lsoso/hydral 20-37, for HTN and CHF and this started. Feels like the new med is doing this)   and was initially evaluated by the Medical Resident. See Original ED Note for H&P and ED course above. I have reviewed and discussed the case, including pertinent history (medical, surgical, family and social) and exam findings with the Medical Resident assigned to Azeb Carroll. I have personally performed and/or participated in the history, exam, medical decision making, and procedures and agree with all pertinent clinical information and any additional changes or corrections are noted below in my assessment and plan. I have discussed this patient in detail with the resident, and provided the instruction and education,       I have reviewed my findings and recommendations with the assigned Medical Resident, Azebraphael Carroll and members of family present at the time of disposition. I have performed a history and physical examination of this patient and directly supervised the resident caring for this patient              [address]        Pt Name: Azeb Carroll  MRN: [id number]  [place] 1965  Date of evaluation: 2/3/2023  Provider: Tushar Miller MD  PCP: Fallon Zarate MD  Note Started: 10:10 AM EST 2/3/23    CHIEF COMPLAINT       Chief Complaint   Patient presents with    Shortness of Breath     And lightheaded, dizzy, started new med lsoso/hydral 20-37, for HTN and CHF and this started. Feels like the new med is doing this       HISTORY OF PRESENT ILLNESS: 1 or more Elements     Limitations to history : None    Azeb Carroll is a 59 y.o. female who presents for patient reports that she has not felt great since starting new blood pressure medication.   She says she felt dizzy and lightheaded. She has she also felt short of breath. Says she felt funny in her throat as well. No swelling. She is not sure if it is a reaction to the medication. She denies wheezing. No rash. No hives. She reports that today she started not feeling right. She reports she started having difficulty speaking and felt some numbness and tingling in her right arm. She denies any history of this in the past.  No chest pain. No back pain. No syncope. On arrival she was complaining of speech difficulties and having signs paresthesias on her face and right side. Stroke activation was activated. Nursing Notes were all reviewed and agreed with or any disagreements were addressed in the HPI. REVIEW OF EXTERNAL NOTE :        5/8/13 carotid US  1/24/23 Pulmonary note    REVIEW OF SYSTEMS :      Positives and Pertinent negatives as per HPI.      SURGICAL HISTORY     Past Surgical History:   Procedure Laterality Date    ABDOMEN SURGERY      CARDIAC CATHETERIZATION  12/07/2022    Dr. Christine Hernández (CERVIX STATUS UNKNOWN)  01/01/2000    INCISIONAL HERNIA REPAIR  04/29/2014    Dr. Tai Cancer  childhood       CURRENTMEDICATIONS       Previous Medications    ALBUTEROL SULFATE HFA (PROVENTIL;VENTOLIN;PROAIR) 108 (90 BASE) MCG/ACT INHALER    Inhale 2 puffs into the lungs every 6 hours as needed for Wheezing or Shortness of Breath    CARVEDILOL (COREG) 25 MG TABLET    Take 1 tablet by mouth 2 times daily (with meals)    CETIRIZINE (ZYRTEC) 10 MG TABLET    Take 1 tablet by mouth daily as needed for Allergies    FUROSEMIDE (LASIX) 20 MG TABLET    Take 1 tablet by mouth daily    ISOSORBIDE-HYDRALAZINE (BIDIL) 20-37.5 MG PER TABLET    Take 1 tablet by mouth 3 times daily    ROSUVASTATIN (CRESTOR) 20 MG TABLET    Take 1 tablet by mouth nightly    SACUBITRIL-VALSARTAN (ENTRESTO)  MG PER TABLET    Take 1 tablet by mouth 2 times daily SPIRONOLACTONE (ALDACTONE) 25 MG TABLET    Take 1 tablet by mouth daily       ALLERGIES     Doxycycline, Iodine, Toradol [ketorolac tromethamine], and Iodides    FAMILYHISTORY       Family History   Problem Relation Age of Onset    Asthma Mother     High Blood Pressure Mother     Diabetes Mother     Heart Disease Mother     Arthritis Mother     Stroke Mother     High Blood Pressure Father     Diabetes Father     Cancer Father     Mental Illness Father     Stroke Father     Asthma Sister     High Blood Pressure Sister     High Blood Pressure Sister     Mental Illness Sister     High Blood Pressure Sister     Arthritis Sister     High Blood Pressure Sister     Diabetes Sister         SOCIAL HISTORY       Social History     Tobacco Use    Smoking status: Never    Smokeless tobacco: Never   Vaping Use    Vaping Use: Never used   Substance Use Topics    Alcohol use: Yes     Alcohol/week: 0.0 standard drinks     Types: 2 - 3 Cans of beer per week     Comment: social    Drug use: No       SCREENINGS   NIH Stroke Scale  Interval: Baseline  Level of Consciousness (1a): Alert  LOC Questions (1b): Answers both correctly  LOC Commands (1c): Performs both tasks correctly  Best Gaze (2): Normal  Visual (3): No visual loss  Facial Palsy (4): Normal symmetrical movement  Motor Arm, Left (5a): No drift  Motor Arm, Right (5b): No drift  Motor Leg, Left (6a): No drift  Motor Leg, Right (6b):  No drift  Limb Ataxia (7): Absent  Sensory (8): (!) Mild to Moderate (\"feels lighter on the right side\")  Best Language (9): No aphasia  Dysarthria (10): Normal  Extinction and Inattention (11): No abnormality  Total: 1    Phyllis Coma Scale  Eye Opening: Spontaneous  Best Verbal Response: Oriented  Best Motor Response: Obeys commands  Phyllis Coma Scale Score: 15                CIWA Assessment  BP: (!) 161/86  Heart Rate: 81           PHYSICAL EXAM  1 or more Elements     ED Triage Vitals   BP Temp Temp src Heart Rate Resp SpO2 Height Weight 02/03/23 0944 02/03/23 0930 -- 02/03/23 0930 02/03/23 0930 02/03/23 0930 -- 02/03/23 0944   (!) 168/106 97.6 °F (36.4 °C)  94 23 100 %  263 lb (119.3 kg)                 Constitutional/General: Alert and oriented x3  Head: Normocephalic and atraumatic  Eyes: PERRL, EOMI, conjunctiva normal, sclera non icteric  ENT:  Oropharynx clear, handling secretions, no trismus, no asymmetry of the posterior oropharynx or uvular edema. No angioedema. Trachea midline. No stridor  Neck: Supple, full ROM, no stridor, no meningeal signs  Respiratory: Lungs clear to auscultation bilaterally, no wheezes, rales, or rhonchi. Not in respiratory distress  Cardiovascular:  Regular rate. Regular rhythm. No murmurs, no gallops, no rubs. 2+ distal pulses. Equal extremity pulses. Chest: No chest wall tenderness  GI:  Abdomen Soft, Non tender, Non distended. No rebound, guarding, or rigidity. No pulsatile masses. Musculoskeletal: Moves all extremities x 4. Warm and well perfused, no clubbing, no cyanosis, no edema. Capillary refill <3 seconds  Integument: skin warm and dry. No rashes. Neurologic: GCS 15, no focal deficits, symmetric strength 5/5 in the upper and lower extremities bilaterally. Speech clear. NIH initially was 2 but now she is speaking clearly. Slightly decreased sensation on right upper extremity and NIH only 1 now.    Psychiatric: Normal Affect            DIAGNOSTIC RESULTS   LABS: Interpreted by Rufino Connolly MD    Labs Reviewed   CBC WITH AUTO DIFFERENTIAL - Abnormal; Notable for the following components:       Result Value    Hemoglobin 10.7 (*)     Hematocrit 31.8 (*)     Neutrophils % 38.8 (*)     Lymphocytes % 45.5 (*)     Eosinophils % 6.1 (*)     All other components within normal limits   POCT GLUCOSE - Normal   BASIC METABOLIC PANEL W/ REFLEX TO MG FOR LOW K   TROPONIN   PROTIME-INR   APTT   URINALYSIS WITH MICROSCOPIC   POCT GLUCOSE       As interpreted by me, the above displayed labs are abnormal. All other labs obtained during this visit were within normal range or not returned as of this dictation. RADIOLOGY:   Non-plain film images such as CT, Ultrasound and MRI are read by the radiologist. Plain radiographic images are visualized and preliminarily interpreted by the ED Provider with the findings documented in the ED course:    Interpretation per the Radiologist below, if available at the time of this note:    XR CHEST PORTABLE   Final Result   1. There is no acute cardiopulmonary disease. CT HEAD WO CONTRAST   Final Result   1. No acute intracranial abnormality on noncontrast head CT. 2. No perfusion mismatch. 3. Unremarkable CTA of the head and neck. CTA NECK W CONTRAST   Final Result   1. No acute intracranial abnormality on noncontrast head CT. 2. No perfusion mismatch. 3. Unremarkable CTA of the head and neck. CTA HEAD W CONTRAST   Final Result   1. No acute intracranial abnormality on noncontrast head CT. 2. No perfusion mismatch. 3. Unremarkable CTA of the head and neck. CT BRAIN PERFUSION   Final Result   1. No acute intracranial abnormality on noncontrast head CT. 2. No perfusion mismatch. 3. Unremarkable CTA of the head and neck. No results found. No results found. PROCEDURES   Unless otherwise noted below, none          CRITICAL CARE TIME (.cct)        PAST MEDICAL HISTORY/Chronic Conditions Affecting Care      has a past medical history of Abnormal carotid pulse (3/30/2017), Asthma, Depression, Dyslipidemia, Hypertension, Mitral valve prolapse, Obesity, and Osteoarthritis.      EMERGENCY DEPARTMENT COURSE    Vitals:    Vitals:    02/03/23 0930 02/03/23 0944 02/03/23 1053   BP:  (!) 168/106 (!) 161/86   Pulse: 94  81   Resp: 23  16   Temp: 97.6 °F (36.4 °C)     SpO2: 100%  98%   Weight:  263 lb (119.3 kg)        Patient was given the following medications:  Medications   sodium chloride flush 0.9 % injection 10 mL (has no administration in time range)   methylPREDNISolone sodium (SOLU-MEDROL) injection 125 mg (125 mg IntraVENous Given 2/3/23 1016)   diphenhydrAMINE (BENADRYL) injection 50 mg (50 mg IntraVENous Given 2/3/23 1015)   iopamidol (ISOVUE-370) 76 % injection 105 mL (105 mLs IntraVENous Given 2/3/23 1018)   clopidogrel (PLAVIX) tablet 300 mg (300 mg Oral Given 2/3/23 1102)   aspirin chewable tablet 324 mg (324 mg Oral Given 2/3/23 1102)                Medical Decision Making/Differential Diagnosis:    CC/HPI Summary, Social Determinants of health, Records Reviewed, DDx, testing done/not done, ED Course, Reassessment, disposition considerations/shared decision making with patient, consults, disposition:      ED Course as of 02/03/23 1130   Fri Feb 03, 2023   1001 EKG Interpretation  Interpreted by emergency department physician, Dr. Brady Copeland     2/3/23  Time: 0955    Rhythm: normal sinus   Rate: normal  Axis: normal  Conduction: normal  ST Segments: no acute change  T Waves: no acute change    Clinical Impression: Sinus rhythm, no acute ischemic changes    Comparison to Old EKG  Stable from prior EKG       [CD]   1050 Spoke with interventional neurology due to the patient's NIH score of 2, no perfusion mismatch no large vessel occlusion patient does not meet TNKase requirements at this time. [CB]      ED Course User Index  [CB] Brian Hudson MD  [CD] Nayan Avitia MD          Medical Decision Making  Strokelike symptoms, considered TIA, considered stroke, considered hemorrhage, consider medication reaction to name a few. Initially she had some difficulty speaking but on my assessment she was speaking clearly. The resident scored her NIH of 2 but now is only 1. She reports some decrease sensation on the right arm. She has no motor weakness. She is improving rapidly. Stroke activation was called, CT, CTA, CTP all performed. CT head per my wet read shows no intracranial hemorrhage.   Chest x-ray per my wet read interpretation negative for acute infiltrate and negative for pneumothorax. Neurology did not recommend tPA, they recommended aspirin and Plavix. Laboratory testing shows normal urinalysis interpreted by me, other test interpreted by me shows CBC with hemoglobin 10.7, be MP normal, troponin 7, coagulation studies normal, glucose 95. Based on her symptoms she will be admitted. There is no signs of anaphylaxis. Her shortness of breath that she has been experiencing after taking her medication is not present at this time. There is no rash or hives. She is not wheezing. She has no chest pain. Nothing to suggest PE. No PTX. No pneumonia. Problems Addressed:  Stroke-like symptoms: acute illness or injury    Amount and/or Complexity of Data Reviewed  External Data Reviewed: notes. Labs: ordered. Radiology: ordered and independent interpretation performed. ECG/medicine tests: ordered and independent interpretation performed. Discussion of management or test interpretation with external provider(s): Neurology  Internal medicine    Risk  OTC drugs. Prescription drug management. Drug therapy requiring intensive monitoring for toxicity. Decision regarding hospitalization. CONSULTS:   IP CONSULT TO INTERNAL MEDICINE  Neurology       I am the Primary Clinician of Record. FINAL IMPRESSION      1. Stroke-like symptoms          DISPOSITION/PLAN     DISPOSITION Admitted 02/03/2023 11:14:06 AM      PATIENT REFERRED TO:  No follow-up provider specified. DISCHARGE MEDICATIONS:  New Prescriptions    No medications on file       DISCONTINUED MEDICATIONS:  Discontinued Medications    ACETAMINOPHEN 650 MG TABS    Take 500 mg by mouth every 6 hours as needed for Pain    MISC.  DEVICES MISC    1 each by Does not apply route once for 1 dose 1 automatic BP cuff              (Please note that portions of this note were completed with a voice recognition program.  Efforts were made to edit the dictations but occasionally words are mis-transcribed.)    Ramone Barahona MD (electronically signed)            Ramone Barahona MD  02/03/23 0026

## 2023-02-03 NOTE — TELEPHONE ENCOUNTER
She can stop BiDil, I would recommend starting on Norvasc 5 mg p.o. daily due to poorly controlled hypertension.

## 2023-02-03 NOTE — PLAN OF CARE
Received message from RN regarding headache. Patient was seen and examined at bedside. Reports chronic generalized headache unchanged since admission. Denies any blurry vision, weakness, or numbness. Tylenol providing some relief. Vitals shows SBP 190s, repeat shows SBP 200s. Patient recently received unfortunate newd about her father passing away. Plan to given Labetalol 5 mg IV once and Hydroxyzine 10 mg once. Will re-evaluate for symptom improvement.

## 2023-02-03 NOTE — H&P
[facility]  Internal Medicine Residency Program  History and Physical    Patient:  Jb Crespo 59 y.o. female MRN: [id number]     Date of Service: 2/3/2023    Hospital Day: 1      Chief complaint: had concerns including Shortness of Breath (And lightheaded, dizzy, started new med lsoso/hydral 20-37, for HTN and CHF and this started. Feels like the new med is doing this). History of Present Illness   The patient is a 59 y.o. female with a past medical history of HTN, HFmEF (EF 40-45%), asthma, HLD, and obesity who presented to the ED with a chief complaint of shortness of breath, right sided paraesthesias, and aphasia starting 30-45 minutes after taking BiDil for the first time today. She was prescribed this medication at a recent cardiology appointment on 1/31/2023. She took the remainder of her medications as normal. She was sitting at her kitchen table with her daughter as her symptoms began. She tried to call her doctors office but reached a voicemail. She then called her pharmacy and was directed to go the ED. She did not take her BP at the time of the event at home. Shortness of breath was the first symptom she experienced and then the paraesthesias and difficulty speaking began. She describes knowing what she wanted to say but feeling like she could not get the words out. Her speaking difficulty was at the worst when she was in the ED. In the ED NIHSS was 2, and she had a CT head, CTA head and neck, CT perfusion, which were all normal without any LVO or perfusion mismatch. CXR with no acute abnormalities. She received a dose of solumedrol for her shortness of breath and ASA and plavix for her neurologic symptoms. BP was 168/106. She no longer is having symptoms of shortness of breath. She now have very minimal sensation changes noted only in her RUE. She feels like she is still having some minor speech difficulty but it is much improved. She denies ever feeling any weakness.  Her only complaint is a 7/10 headache across the front of her head. She does not typically get headaches. She has OA in both knees but worse in her left knee. She feels her left knee is swollen. She has a history of asthma and has to use her albuterol inhaler about once a week, and she states she is unable to afford the other asthma medication she has been prescribed. She denies any family history of stroke. She has never smoked. Denies any alcohol or drug use. She lives at home with her daughter and grandchild. Past Medical History:      Diagnosis Date    Abnormal carotid pulse 3/30/2017    Asthma     Depression     Dyslipidemia     Hypertension     Mitral valve prolapse     Obesity     Osteoarthritis        Past Surgical History:        Procedure Laterality Date    ABDOMEN SURGERY      CARDIAC CATHETERIZATION  12/07/2022    Dr. Jaime Barone (CERVIX STATUS UNKNOWN)  01/01/2000    INCISIONAL HERNIA REPAIR  04/29/2014    Dr. Suzan Hart  childhood       Medications Prior to Admission:    Prior to Admission medications    Medication Sig Start Date End Date Taking?  Authorizing Provider   carvedilol (COREG) 25 MG tablet Take 1 tablet by mouth 2 times daily (with meals) 1/17/23   Colton Ramírez, DO   furosemide (LASIX) 20 MG tablet Take 1 tablet by mouth daily 1/17/23   Kwakuine Darrell, DO   rosuvastatin (CRESTOR) 20 MG tablet Take 1 tablet by mouth nightly 1/17/23   Colton Ramírez, DO   sacubitril-valsartan (ENTRESTO)  MG per tablet Take 1 tablet by mouth 2 times daily 1/17/23   Colton Ramírez, DO   spironolactone (ALDACTONE) 25 MG tablet Take 1 tablet by mouth daily 1/17/23   DENI Clarke CNP   albuterol sulfate HFA (PROVENTIL;VENTOLIN;PROAIR) 108 (90 Base) MCG/ACT inhaler Inhale 2 puffs into the lungs every 6 hours as needed for Wheezing or Shortness of Breath 11/2/22   Deborra Bowels, DO   cetirizine (ZYRTEC) 10 MG tablet Take 1 tablet by mouth daily as needed for Allergies 11/2/22   Tyler Quach DO       Allergies:  Doxycycline, Iodine, Toradol [ketorolac tromethamine], Bidil [isosorb dinitrate-hydralazine], and Iodides    Social History:   TOBACCO:   reports that she has never smoked. She has never used smokeless tobacco.  ETOH:   reports current alcohol use. Family History:       Problem Relation Age of Onset    Asthma Mother     High Blood Pressure Mother     Diabetes Mother     Heart Disease Mother     Arthritis Mother     Stroke Mother     High Blood Pressure Father     Diabetes Father     Cancer Father     Mental Illness Father     Stroke Father     Asthma Sister     High Blood Pressure Sister     High Blood Pressure Sister     Mental Illness Sister     High Blood Pressure Sister     Arthritis Sister     High Blood Pressure Sister     Diabetes Sister        REVIEW OF SYSTEMS:    Review of Systems   Constitutional:  Negative for chills and fever. Respiratory:  Positive for shortness of breath. Negative for chest tightness. Cardiovascular:  Negative for chest pain, palpitations and leg swelling. Gastrointestinal:  Negative for abdominal distention and abdominal pain. Genitourinary:  Negative for difficulty urinating, dysuria and hematuria. Musculoskeletal:  Positive for arthralgias and joint swelling (L knee). Physical Exam   Vitals: BP (!) 156/92   Pulse 78   Temp 97.4 °F (36.3 °C) (Temporal)   Resp 18   Ht 5' 5\" (1.651 m)   Wt 260 lb (117.9 kg)   SpO2 97%   BMI 43.27 kg/m²     Physical Exam  Constitutional:       Appearance: Normal appearance. She is obese. HENT:      Head: Normocephalic and atraumatic. Nose: Nose normal.      Mouth/Throat:      Mouth: Mucous membranes are moist.      Pharynx: Oropharynx is clear. Eyes:      Extraocular Movements: Extraocular movements intact. Conjunctiva/sclera: Conjunctivae normal.      Pupils: Pupils are equal, round, and reactive to light.    Cardiovascular: Rate and Rhythm: Normal rate and regular rhythm. Pulses: Normal pulses. Heart sounds: Normal heart sounds. Pulmonary:      Effort: Pulmonary effort is normal.      Breath sounds: Normal breath sounds. No wheezing or rales. Abdominal:      General: Abdomen is flat. Bowel sounds are normal.      Palpations: Abdomen is soft. Musculoskeletal:      Right lower leg: Edema (1+) present. Left lower leg: Edema (1+) present. Skin:     General: Skin is warm and dry. Neurological:      Mental Status: She is alert and oriented to person, place, and time. Cranial Nerves: Cranial nerves 2-12 are intact. Motor: Weakness: 4+/5 on right arm flexion and extension, remainder of exam 5/5. LE testing limited 2/2 pain in knees. Deep Tendon Reflexes:      Reflex Scores:       Bicep reflexes are 2+ on the right side and 2+ on the left side. Brachioradialis reflexes are 2+ on the right side and 2+ on the left side. Achilles reflexes are 2+ on the right side and 2+ on the left side. Comments: Finger to nose normal, no pronator drift   Sensation intact bilaterally in LE. Mildly decreased sensation to light touch in the RUE compared to left. Psychiatric:         Mood and Affect: Mood normal.         Behavior: Behavior normal.         Thought Content: Thought content normal.         Judgment: Judgment normal.      Labs and Imaging Studies   Basic Labs  Recent Labs     02/03/23  1014 02/03/23  1018     --    K 4.1  --      --    CO2 25  --    BUN 18  --    CREATININE 0.9  --    GLUCOSE 99 95   CALCIUM 9.3  --        Recent Labs     02/03/23  1014   WBC 4.9   RBC 3.64   HGB 10.7*   HCT 31.8*   MCV 87.4   MCH 29.4   MCHC 33.6   RDW 12.3      MPV 10.9         Imaging Studies:  XR CHEST PORTABLE   Final Result   1. There is no acute cardiopulmonary disease. CT HEAD WO CONTRAST   Final Result   1. No acute intracranial abnormality on noncontrast head CT.    2. No perfusion mismatch. 3. Unremarkable CTA of the head and neck. CTA NECK W CONTRAST   Final Result   1. No acute intracranial abnormality on noncontrast head CT. 2. No perfusion mismatch. 3. Unremarkable CTA of the head and neck. CTA HEAD W CONTRAST   Final Result   1. No acute intracranial abnormality on noncontrast head CT. 2. No perfusion mismatch. 3. Unremarkable CTA of the head and neck. CT BRAIN PERFUSION   Final Result   1. No acute intracranial abnormality on noncontrast head CT. 2. No perfusion mismatch. 3. Unremarkable CTA of the head and neck. MRI BRAIN WO CONTRAST    (Results Pending)          Resident's Assessment and Plan     Assessment and Plan:    TIA 2/2 hypotension following new medication   Took all medications including new med BiDil at 8am, started having symptoms at 8:30, 8:45   Sx: shortness of breath, paraesthesias, aphasia  Symptoms improving by time of evaluation   ED:   NIHSS: 2   /106  ASA and plavix   CT, CTA head and neck, CT perfusion: all WNL, no acute abnormality or perfusion mismatch   Recent echo 10/30/22: \"interatrial septum appears intact\"  Never smoked   Hx of HLD  and HTN   No family hx of stroke   PLAN:  Lipid panel   A1c   ASA   Plavix 21 days   Neurology consulted   MRI brain wo contrast   HFmEF: EF of 45%, on GDMT  Follows with Dr Bell [place]   Recent visit on 1/31 with plan to start BiDil   Patient took BiDil for the first time just prior to symptoms starting   Meds: Coreg 25 BID, Entresto BID, Aldactone 25 daily, Lasix 20 daily  Echo 10/30/22: EF 45%  Cardiac cath 12/1/22  PLAN: Continue home meds, hold BiDil   Headache, tension   Tylenol as needed   HTN - chronic   /106 in ED   Continue home meds  PRN labetalol for SBP >220  Shortness of breath following taking bidil  Allergy?   Has had similar sx when taking doxycycline   100% on RA   S/p solumedrol in ED  Troponin: 7   Chronic - Asthma, albuterol 1x a week   Continue PRN albuterol   Pt states she cannot afford the other inhaler she has been prescribed   HLD - chronic   Home med: Crestor 20 , continue   Lipid panel, follow   Hx of mitral regurg? 10/30/22 with normal mitral valve structure and function  Obesity   OA of knee    PT/OT evaluation: Ordered  DVT prophylaxis/ GI prophylaxis: Lovenox/ Normal diet    Disposition: admit to internal medicine     Larry Granados MD, PGY-1  Attending physician: Dr. Robb Masters  Internal Medicine Residency Program  Attending Physician Statement:  Umer Garcia M.D., F.A.C.P. I have discussed the case, including pertinent history and exam findings with the resident. I have seen and examined the patient--agree with the resident assessment of ROS, PMHx, PSHx, meds reviewed and assessment--Remainder of medical problems as per resident note. Hospital charts reviewed, including other providers notes, relevant labs and imaging.    >50% of time spent coordinating care with residents, other providers and/or counseling patient   re: workup/plan and orders as documented by the resident   For billing--Using Medical decision making level of complexity and time spent on case    See my note for progress and DC on 2/4-

## 2023-02-03 NOTE — TELEPHONE ENCOUNTER
Then, she can stay on Bidil, this is one of the best medications for her, cancel amlodipine and call us on Monday to see how she is doing.

## 2023-02-04 ENCOUNTER — APPOINTMENT (OUTPATIENT)
Dept: MRI IMAGING | Age: 58
End: 2023-02-04
Payer: COMMERCIAL

## 2023-02-04 VITALS
DIASTOLIC BLOOD PRESSURE: 78 MMHG | OXYGEN SATURATION: 97 % | WEIGHT: 255.73 LBS | RESPIRATION RATE: 18 BRPM | HEIGHT: 65 IN | HEART RATE: 74 BPM | SYSTOLIC BLOOD PRESSURE: 147 MMHG | TEMPERATURE: 97.9 F | BODY MASS INDEX: 42.61 KG/M2

## 2023-02-04 PROBLEM — I16.1 HYPERTENSIVE EMERGENCY: Status: ACTIVE | Noted: 2023-02-04

## 2023-02-04 PROBLEM — T50.905A DRUG REACTION: Status: ACTIVE | Noted: 2023-02-04

## 2023-02-04 PROCEDURE — G0378 HOSPITAL OBSERVATION PER HR: HCPCS

## 2023-02-04 PROCEDURE — 6360000002 HC RX W HCPCS

## 2023-02-04 PROCEDURE — 99253 IP/OBS CNSLTJ NEW/EST LOW 45: CPT | Performed by: NURSE PRACTITIONER

## 2023-02-04 PROCEDURE — 99223 1ST HOSP IP/OBS HIGH 75: CPT | Performed by: INTERNAL MEDICINE

## 2023-02-04 PROCEDURE — 6370000000 HC RX 637 (ALT 250 FOR IP)

## 2023-02-04 PROCEDURE — 97161 PT EVAL LOW COMPLEX 20 MIN: CPT

## 2023-02-04 PROCEDURE — 2580000003 HC RX 258

## 2023-02-04 PROCEDURE — 96372 THER/PROPH/DIAG INJ SC/IM: CPT

## 2023-02-04 PROCEDURE — 70551 MRI BRAIN STEM W/O DYE: CPT

## 2023-02-04 PROCEDURE — 96375 TX/PRO/DX INJ NEW DRUG ADDON: CPT

## 2023-02-04 RX ORDER — CLOPIDOGREL BISULFATE 75 MG/1
75 TABLET ORAL DAILY
Qty: 30 TABLET | Refills: 3 | Status: CANCELLED | OUTPATIENT
Start: 2023-02-05 | End: 2023-02-25

## 2023-02-04 RX ORDER — CLOPIDOGREL BISULFATE 75 MG/1
75 TABLET ORAL DAILY
Qty: 20 TABLET | Refills: 0 | Status: SHIPPED | OUTPATIENT
Start: 2023-02-05 | End: 2023-02-25

## 2023-02-04 RX ORDER — ROSUVASTATIN CALCIUM 40 MG/1
40 TABLET, COATED ORAL NIGHTLY
Qty: 90 TABLET | Refills: 1 | Status: SHIPPED | OUTPATIENT
Start: 2023-02-04

## 2023-02-04 RX ORDER — LABETALOL HYDROCHLORIDE 5 MG/ML
10 INJECTION, SOLUTION INTRAVENOUS EVERY 6 HOURS PRN
Status: DISCONTINUED | OUTPATIENT
Start: 2023-02-04 | End: 2023-02-04 | Stop reason: HOSPADM

## 2023-02-04 RX ORDER — ASPIRIN 81 MG/1
81 TABLET, CHEWABLE ORAL DAILY
Qty: 30 TABLET | Refills: 3 | Status: SHIPPED | OUTPATIENT
Start: 2023-02-05

## 2023-02-04 RX ORDER — GUAIFENESIN 200 MG/10ML
200 LIQUID ORAL EVERY 4 HOURS PRN
Status: DISCONTINUED | OUTPATIENT
Start: 2023-02-04 | End: 2023-02-04 | Stop reason: HOSPADM

## 2023-02-04 RX ORDER — AMLODIPINE BESYLATE 5 MG/1
5 TABLET ORAL DAILY
Qty: 90 TABLET | Refills: 1 | Status: SHIPPED | OUTPATIENT
Start: 2023-02-04

## 2023-02-04 RX ADMIN — CLOPIDOGREL BISULFATE 75 MG: 75 TABLET ORAL at 08:17

## 2023-02-04 RX ADMIN — SODIUM CHLORIDE, PRESERVATIVE FREE 10 ML: 5 INJECTION INTRAVENOUS at 10:24

## 2023-02-04 RX ADMIN — CARVEDILOL 25 MG: 25 TABLET, FILM COATED ORAL at 10:21

## 2023-02-04 RX ADMIN — ENOXAPARIN SODIUM 30 MG: 100 INJECTION SUBCUTANEOUS at 08:17

## 2023-02-04 RX ADMIN — SPIRONOLACTONE 25 MG: 25 TABLET ORAL at 08:16

## 2023-02-04 RX ADMIN — LORAZEPAM 1 MG: 2 INJECTION INTRAMUSCULAR; INTRAVENOUS at 08:18

## 2023-02-04 RX ADMIN — FUROSEMIDE 20 MG: 40 TABLET ORAL at 10:21

## 2023-02-04 RX ADMIN — SACUBITRIL AND VALSARTAN 1 TABLET: 97; 103 TABLET, FILM COATED ORAL at 08:17

## 2023-02-04 RX ADMIN — ASPIRIN 81 MG CHEWABLE TABLET 81 MG: 81 TABLET CHEWABLE at 08:17

## 2023-02-04 ASSESSMENT — PAIN DESCRIPTION - DESCRIPTORS: DESCRIPTORS: ACHING;DISCOMFORT;DULL

## 2023-02-04 ASSESSMENT — PAIN DESCRIPTION - LOCATION: LOCATION: HEAD

## 2023-02-04 ASSESSMENT — PAIN SCALES - GENERAL: PAINLEVEL_OUTOF10: 5

## 2023-02-04 NOTE — CONSULTS
NEUROLOGY CONSULT NOTE      Requesting Physician:  Lubna Petit MD    Reason for Consult:  Evaluate for TIA- right sided parasthesias and aphasia, following taking new med (BiDil) sx almost completely resolved. History of Present Illness:  Jones Thornton is a 59 y.o. female  with h/o HTN, CHF, Dyslipidemia, Mitral Valve Prolapse, and Osteoarthritis who was admitted to [facility] on 2/3/2023 with presentation of shortness of breath, difficulty with speech, right-sided paresthesias,  and lightheadedness. Patient reports that today after taking her BiDil on day of presentation along with her normal medications as per her routine. Later while sitting at the kitchen table with her daughter, she noticed onset of s redness of breath followed by right-sided paresthesias and then onset of difficulty getting her words out. She subsequently presented to the ED bed 30 to 45 minutes leading for evaluation with report of worsening speech problems when she got to the ED. Patient was initiated on code stroke protocol through ED. Initial BP: 168/106 with pulse of 94  NIHSS: 2  CT Head: No acute intracranial abnormality   CTA: Unremarkable CTA of the head and neck. CTP: No perfusion mismatch. MRI: No acute intracranial abnormality; No acute infarct; Minimal cerebellar tonsillar ectopia. Telestroke: n/a   ED/Hospital Course: Symptoms improved quickly in the ED. Patient underwent stroke evaluation but was not considered a tPA candidate based on resolving symptoms, low NIHSS, and negative imaging studies. Laboratory studies did not show any significant abnormalities. Patient was started on aspirin and Plavix and admitted for further TIA evaluation.       Past Medical History:        Diagnosis Date    Abnormal carotid pulse 3/30/2017    Asthma     Depression     Dyslipidemia     Hypertension     Mitral valve prolapse     Obesity     Osteoarthritis            Procedure Laterality Date    ABDOMEN SURGERY CARDIAC CATHETERIZATION  12/07/2022    Dr. Shorty Mccarthy (CERVIX STATUS UNKNOWN)  01/01/2000    INCISIONAL HERNIA REPAIR  04/29/2014    Dr. Tracey Newton  childhood       Social History:  Social History     Tobacco Use   Smoking Status Never   Smokeless Tobacco Never     Social History     Substance and Sexual Activity   Alcohol Use Yes    Alcohol/week: 0.0 standard drinks    Types: 2 - 3 Cans of beer per week    Comment: social     Social History     Substance and Sexual Activity   Drug Use No     Single    Family History:       Problem Relation Age of Onset    Asthma Mother     High Blood Pressure Mother     Diabetes Mother     Heart Disease Mother     Arthritis Mother     Stroke Mother     High Blood Pressure Father     Diabetes Father     Cancer Father     Mental Illness Father     Stroke Father     Asthma Sister     High Blood Pressure Sister     High Blood Pressure Sister     Mental Illness Sister     High Blood Pressure Sister     Arthritis Sister     High Blood Pressure Sister     Diabetes Sister        Review of Systems:  Constitutional:  Negative for chills and fever. Respiratory:  Positive for shortness of breath. Negative for chest tightness. Cardiovascular:  Negative for chest pain, palpitations and leg swelling. Gastrointestinal:  Negative for abdominal distention and abdominal pain. Genitourinary:  Negative for difficulty urinating, dysuria and hematuria. Musculoskeletal:  Positive for arthralgias and joint swelling (L knee). Allergies:     Allergies   Allergen Reactions    Doxycycline Swelling and Other (See Comments)    Iodine Hives     IV dye    Toradol [Ketorolac Tromethamine] Anaphylaxis    Bidil [Isosorb Dinitrate-Hydralazine] Other (See Comments)     Difficulty breathing    Iodides Hives        Current Medications:   labetalol (NORMODYNE;TRANDATE) injection 10 mg, Q6H PRN  guaiFENesin (ROBITUSSIN) 100 MG/5ML liquid 200 mg, Q4H PRN  albuterol (PROVENTIL) nebulizer solution 2.5 mg, Q6H PRN  carvedilol (COREG) tablet 25 mg, BID WC  cetirizine (ZYRTEC) tablet 10 mg, Daily PRN  furosemide (LASIX) tablet 20 mg, Daily  sacubitril-valsartan (ENTRESTO)  MG per tablet 1 tablet, BID  spironolactone (ALDACTONE) tablet 25 mg, Daily  sodium chloride flush 0.9 % injection 5-40 mL, 2 times per day  sodium chloride flush 0.9 % injection 5-40 mL, PRN  0.9 % sodium chloride infusion, PRN  enoxaparin Sodium (LOVENOX) injection 30 mg, BID  ondansetron (ZOFRAN-ODT) disintegrating tablet 4 mg, Q8H PRN   Or  ondansetron (ZOFRAN) injection 4 mg, Q6H PRN  polyethylene glycol (GLYCOLAX) packet 17 g, Daily PRN  acetaminophen (TYLENOL) tablet 650 mg, Q6H PRN   Or  acetaminophen (TYLENOL) suppository 650 mg, Q6H PRN  aspirin chewable tablet 81 mg, Daily  clopidogrel (PLAVIX) tablet 75 mg, Daily  rosuvastatin (CRESTOR) tablet 40 mg, Nightly       Physical Exam:  BP (!) 184/94   Pulse 82   Temp 98 °F (36.7 °C) (Oral)   Resp 16   Ht 5' 5\" (1.651 m)   Wt 255 lb 11.7 oz (116 kg)   SpO2 95%   BMI 42.56 kg/m²  I Body mass index is 42.56 kg/m². I   Wt Readings from Last 1 Encounters:   02/04/23 255 lb 11.7 oz (116 kg)          HEENT: Normocephalic, atraumatic, no lesions or abnormalities noted. Neck:  supple with full ROM; no masses, nodes or bruits; no cervical tenderness on palpation. Lungs:  clear to auscultation  bilaterally     CV: RRR without gallops or murmurs     Extremities: no c/c/e      Back: no tenderness with palpation / Tenderness per diagram ; no scoliosis; no kyphosis negative straight leg raising normal ROM in low back, pelvis, hips         Neurologic Exam     Mental Status:  Patient was alert, responsive, oriented, appropriate, answering questions, and following commands. Speech was fluent with normal sensorium and cognition.    (Mental status exam was grossly intact. )   Cranial Nerves: Pupils were equal round and reactive to light and accommodation;    Visual fields were full on confrontation;  Funduscopic exam was normal; no pappilledema   Extraocular movements were intact; no nystagmus; Intact facial sensation to temp, pinprick, and light touch; Symmetric facial movements with good lip and eye closure bilaterally; Hearing was intact; Inman was midline;    Normal palatal elevation with midline uvula  Sternocleidomastoid  / Trapezius strength of 5/5. Tongue was midline with no atrophy or fasciculations  (CN -II-XII was grossly intact.)  Motor Exam:  Strength was 5/5 throughout; normal tone and bulk; no atrophy or fasiculations noted. (Normal strength bilaterally in all muscle groups tested)    Sensory Exam:  Normal sensation to light touch, pin-prick, and temperature; No sensory extinction. (Intact sensation to all modalities. Left / Right sided sensory deficits   Focal sensory deficits: __ )      Cerebella Exam:  Intact finger-nose-finger, rapidly alternating movements, fine motor movements, and heel-down-shin maneuvers; No cerebellar rebound or drift; No tremors   Normal tandem gait. Negative Romberg   (Normal cerebellum function on gross exam)    Gait:  Normal gait pattern with intact heel/toe walking. (Gait was not tested. Normal Gait). Reflexes: normal and symmetric bilaterally; Babinski is negative     Labs:    CBC:   Recent Labs     02/03/23  1014   WBC 4.9   HGB 10.7*      MCV 87.4   MCH 29.4   MCHC 33.6   RDW 12.3     CMP:  Recent Labs     02/03/23  1014 02/03/23  1018     --    K 4.1  --      --    CO2 25  --    BUN 18  --    CREATININE 0.9  --    LABGLOM >60  --    GLUCOSE 99 95   CALCIUM 9.3  --      Liver: No results for input(s): AST, ALT, ALKPHOS, PROT, LABALBU, BILITOT in the last 72 hours.     Invalid input(s): BILDIR  INR:   Recent Labs     02/03/23  1014   PROTIME 10.8   INR 1.0       ToxicologyNo results for input(s): PHENYTOIN, CARBTOT, PHENOBARB, VALPROATE, LAMOTRIG in the last 72 hours. Invalid input(s):  KEPPRA  No results for input(s): AMPMETHURSCR, BARBTQTU, BDZQTU, CANNABQUANT, COCMETQTU, OPIAU, PCPQUANT in the last 72 hours. Radiology:  CT HEAD WO CONTRAST    Result Date: 2/3/2023  EXAMINATION: CTA OF THE HEAD WITH CONTRAST WITH PERFUSION; CTA OF THE NECK; CT OF THE HEAD WITHOUT CONTRAST; CTA OF THE HEAD WITH CONTRAST 2/3/2023 10:10 am: TECHNIQUE: CTA of the head/brain was performed with the administration of intravenous contrast. Multiplanar reformatted images are provided for review. MIP images are provided for review. Automated exposure control, iterative reconstruction, and/or weight based adjustment of the mA/kV was utilized to reduce the radiation dose to as low as reasonably achievable.; CTA of the neck was performed with the administration of intravenous contrast. Multiplanar reformatted images are provided for review. MIP images are provided for review. Stenosis of the internal carotid arteries measured using NASCET criteria. Automated exposure control, iterative reconstruction, and/or weight based adjustment of the mA/kV was utilized to reduce the radiation dose to as low as reasonably achievable.; CT of the head was performed without the administration of intravenous contrast. Automated exposure control, iterative reconstruction, and/or weight based adjustment of the mA/kV was utilized to reduce the radiation dose to as low as reasonably achievable. Noncontrast CT of the head with reconstructed 2-D images are also provided for review. COMPARISON: None. HISTORY: ORDERING SYSTEM PROVIDED HISTORY: rigth side numbness and aphasia TECHNOLOGIST PROVIDED HISTORY: Reason for exam:->rigth side numbness and aphasia Has a \"code stroke\" or \"stroke alert\" been called? ->Yes Decision Support Exception - unselect if not a suspected or confirmed emergency medical condition->Emergency Medical Condition ([place]) What reading provider will be dictating this exam?->CRC; ORDERING SYSTEM PROVIDED HISTORY: right side numbness and aphasia TECHNOLOGIST PROVIDED HISTORY: Reason for exam:->right side numbness and aphasia Has a \"code stroke\" or \"stroke alert\" been called? ->Yes Decision Support Exception - unselect if not a suspected or confirmed emergency medical condition->Emergency Medical Condition ([place]) What reading provider will be dictating this exam?->CRC FINDINGS: CT HEAD: BRAIN/VENTRICLES:  No acute intracranial hemorrhage or extraaxial fluid collection. Grey-white differentiation is maintained. No evidence of mass, mass effect or midline shift. No evidence of hydrocephalus. ORBITS: The visualized portion of the orbits demonstrate no acute abnormality. SINUSES:  The visualized paranasal sinuses and mastoid air cells demonstrate no acute abnormality. SOFT TISSUES/SKULL: No acute abnormality of the visualized skull or soft tissues. CTA NECK: AORTIC ARCH/ARCH VESSELS: No dissection or arterial injury. No significant stenosis of the brachiocephalic or subclavian arteries. CAROTID ARTERIES: No dissection, arterial injury, or hemodynamically significant stenosis by NASCET criteria. VERTEBRAL ARTERIES: No dissection, arterial injury, or significant stenosis. SOFT TISSUES: The lung apices are clear. No cervical or superior mediastinal lymphadenopathy. The larynx and pharynx are unremarkable. No acute abnormality of the salivary and thyroid glands. BONES: No acute osseous abnormality. CTA HEAD: ANTERIOR CIRCULATION: No significant stenosis of the intracranial internal carotid, anterior cerebral, or middle cerebral arteries. No aneurysm. POSTERIOR CIRCULATION: No significant stenosis of the vertebral, basilar, or posterior cerebral arteries. No aneurysm. OTHER: No dural venous sinus thrombosis on this non-dedicated study. CT PERFUSION: EXAM QUALITY: The examination is diagnostic with appropriate arterial inflow and venous outflow curves, and diagnostic perfusion maps.  CORE INFARCT: The total area of ischemic core is 0 mL (CBF<30% volume). TOTAL HYPOPERFUSION: The total area of hypoperfusion is 0 mL (Tmax>6s volume). PENUMBRA: The penumbra (mismatch) volume is 0 mL and is located in the n/a. The mismatch ratio is n/a.     1. No acute intracranial abnormality on noncontrast head CT. 2. No perfusion mismatch. 3. Unremarkable CTA of the head and neck. XR CHEST PORTABLE    Result Date: 2/3/2023  EXAMINATION: ONE XRAY VIEW OF THE CHEST 2/3/2023 10:43 am COMPARISON: None. HISTORY: ORDERING SYSTEM PROVIDED HISTORY: speech abnormal TECHNOLOGIST PROVIDED HISTORY: Reason for exam:->speech abnormal What reading provider will be dictating this exam?->CRC FINDINGS: The cardiac silhouette is at upper limits of normal in size. There is no mediastinal widening The lungs are clear. There is no focal infiltrate or effusion. 1. There is no acute cardiopulmonary disease. CTA NECK W CONTRAST    Result Date: 2/3/2023  EXAMINATION: CTA OF THE HEAD WITH CONTRAST WITH PERFUSION; CTA OF THE NECK; CT OF THE HEAD WITHOUT CONTRAST; CTA OF THE HEAD WITH CONTRAST 2/3/2023 10:10 am: TECHNIQUE: CTA of the head/brain was performed with the administration of intravenous contrast. Multiplanar reformatted images are provided for review. MIP images are provided for review. Automated exposure control, iterative reconstruction, and/or weight based adjustment of the mA/kV was utilized to reduce the radiation dose to as low as reasonably achievable.; CTA of the neck was performed with the administration of intravenous contrast. Multiplanar reformatted images are provided for review. MIP images are provided for review. Stenosis of the internal carotid arteries measured using NASCET criteria.  Automated exposure control, iterative reconstruction, and/or weight based adjustment of the mA/kV was utilized to reduce the radiation dose to as low as reasonably achievable.; CT of the head was performed without the administration of intravenous contrast. Automated exposure control, iterative reconstruction, and/or weight based adjustment of the mA/kV was utilized to reduce the radiation dose to as low as reasonably achievable. Noncontrast CT of the head with reconstructed 2-D images are also provided for review. COMPARISON: None. HISTORY: ORDERING SYSTEM PROVIDED HISTORY: rigth side numbness and aphasia TECHNOLOGIST PROVIDED HISTORY: Reason for exam:->rigth side numbness and aphasia Has a \"code stroke\" or \"stroke alert\" been called? ->Yes Decision Support Exception - unselect if not a suspected or confirmed emergency medical condition->Emergency Medical Condition ([place]) What reading provider will be dictating this exam?->CRC; ORDERING SYSTEM PROVIDED HISTORY: right side numbness and aphasia TECHNOLOGIST PROVIDED HISTORY: Reason for exam:->right side numbness and aphasia Has a \"code stroke\" or \"stroke alert\" been called? ->Yes Decision Support Exception - unselect if not a suspected or confirmed emergency medical condition->Emergency Medical Condition ([place]) What reading provider will be dictating this exam?->CRC FINDINGS: CT HEAD: BRAIN/VENTRICLES:  No acute intracranial hemorrhage or extraaxial fluid collection. Grey-white differentiation is maintained. No evidence of mass, mass effect or midline shift. No evidence of hydrocephalus. ORBITS: The visualized portion of the orbits demonstrate no acute abnormality. SINUSES:  The visualized paranasal sinuses and mastoid air cells demonstrate no acute abnormality. SOFT TISSUES/SKULL: No acute abnormality of the visualized skull or soft tissues. CTA NECK: AORTIC ARCH/ARCH VESSELS: No dissection or arterial injury. No significant stenosis of the brachiocephalic or subclavian arteries. CAROTID ARTERIES: No dissection, arterial injury, or hemodynamically significant stenosis by NASCET criteria. VERTEBRAL ARTERIES: No dissection, arterial injury, or significant stenosis.  SOFT TISSUES: The lung apices are clear.  No cervical or superior mediastinal lymphadenopathy. The larynx and pharynx are unremarkable. No acute abnormality of the salivary and thyroid glands. BONES: No acute osseous abnormality. CTA HEAD: ANTERIOR CIRCULATION: No significant stenosis of the intracranial internal carotid, anterior cerebral, or middle cerebral arteries. No aneurysm. POSTERIOR CIRCULATION: No significant stenosis of the vertebral, basilar, or posterior cerebral arteries. No aneurysm. OTHER: No dural venous sinus thrombosis on this non-dedicated study. CT PERFUSION: EXAM QUALITY: The examination is diagnostic with appropriate arterial inflow and venous outflow curves, and diagnostic perfusion maps. CORE INFARCT: The total area of ischemic core is 0 mL (CBF<30% volume). TOTAL HYPOPERFUSION: The total area of hypoperfusion is 0 mL (Tmax>6s volume). PENUMBRA: The penumbra (mismatch) volume is 0 mL and is located in the n/a. The mismatch ratio is n/a.     1. No acute intracranial abnormality on noncontrast head CT. 2. No perfusion mismatch. 3. Unremarkable CTA of the head and neck. CT BRAIN PERFUSION    Result Date: 2/3/2023  EXAMINATION: CTA OF THE HEAD WITH CONTRAST WITH PERFUSION; CTA OF THE NECK; CT OF THE HEAD WITHOUT CONTRAST; CTA OF THE HEAD WITH CONTRAST 2/3/2023 10:10 am: TECHNIQUE: CTA of the head/brain was performed with the administration of intravenous contrast. Multiplanar reformatted images are provided for review. MIP images are provided for review. Automated exposure control, iterative reconstruction, and/or weight based adjustment of the mA/kV was utilized to reduce the radiation dose to as low as reasonably achievable.; CTA of the neck was performed with the administration of intravenous contrast. Multiplanar reformatted images are provided for review. MIP images are provided for review. Stenosis of the internal carotid arteries measured using NASCET criteria.  Automated exposure control, iterative reconstruction, and/or weight based adjustment of the mA/kV was utilized to reduce the radiation dose to as low as reasonably achievable.; CT of the head was performed without the administration of intravenous contrast. Automated exposure control, iterative reconstruction, and/or weight based adjustment of the mA/kV was utilized to reduce the radiation dose to as low as reasonably achievable. Noncontrast CT of the head with reconstructed 2-D images are also provided for review. COMPARISON: None. HISTORY: ORDERING SYSTEM PROVIDED HISTORY: rigth side numbness and aphasia TECHNOLOGIST PROVIDED HISTORY: Reason for exam:->rigth side numbness and aphasia Has a \"code stroke\" or \"stroke alert\" been called? ->Yes Decision Support Exception - unselect if not a suspected or confirmed emergency medical condition->Emergency Medical Condition ([place]) What reading provider will be dictating this exam?->CRC; ORDERING SYSTEM PROVIDED HISTORY: right side numbness and aphasia TECHNOLOGIST PROVIDED HISTORY: Reason for exam:->right side numbness and aphasia Has a \"code stroke\" or \"stroke alert\" been called? ->Yes Decision Support Exception - unselect if not a suspected or confirmed emergency medical condition->Emergency Medical Condition (MA) What reading provider will be dictating this exam?->CRC FINDINGS: CT HEAD: BRAIN/VENTRICLES:  No acute intracranial hemorrhage or extraaxial fluid collection. Grey-white differentiation is maintained. No evidence of mass, mass effect or midline shift. No evidence of hydrocephalus. ORBITS: The visualized portion of the orbits demonstrate no acute abnormality. SINUSES:  The visualized paranasal sinuses and mastoid air cells demonstrate no acute abnormality. SOFT TISSUES/SKULL: No acute abnormality of the visualized skull or soft tissues. CTA NECK: AORTIC ARCH/ARCH VESSELS: No dissection or arterial injury. No significant stenosis of the brachiocephalic or subclavian arteries.  CAROTID ARTERIES: No dissection, arterial injury, or hemodynamically significant stenosis by NASCET criteria. VERTEBRAL ARTERIES: No dissection, arterial injury, or significant stenosis. SOFT TISSUES: The lung apices are clear. No cervical or superior mediastinal lymphadenopathy. The larynx and pharynx are unremarkable. No acute abnormality of the salivary and thyroid glands. BONES: No acute osseous abnormality. CTA HEAD: ANTERIOR CIRCULATION: No significant stenosis of the intracranial internal carotid, anterior cerebral, or middle cerebral arteries. No aneurysm. POSTERIOR CIRCULATION: No significant stenosis of the vertebral, basilar, or posterior cerebral arteries. No aneurysm. OTHER: No dural venous sinus thrombosis on this non-dedicated study. CT PERFUSION: EXAM QUALITY: The examination is diagnostic with appropriate arterial inflow and venous outflow curves, and diagnostic perfusion maps. CORE INFARCT: The total area of ischemic core is 0 mL (CBF<30% volume). TOTAL HYPOPERFUSION: The total area of hypoperfusion is 0 mL (Tmax>6s volume). PENUMBRA: The penumbra (mismatch) volume is 0 mL and is located in the n/a. The mismatch ratio is n/a.     1. No acute intracranial abnormality on noncontrast head CT. 2. No perfusion mismatch. 3. Unremarkable CTA of the head and neck. CTA HEAD W CONTRAST    Result Date: 2/3/2023  EXAMINATION: CTA OF THE HEAD WITH CONTRAST WITH PERFUSION; CTA OF THE NECK; CT OF THE HEAD WITHOUT CONTRAST; CTA OF THE HEAD WITH CONTRAST 2/3/2023 10:10 am: TECHNIQUE: CTA of the head/brain was performed with the administration of intravenous contrast. Multiplanar reformatted images are provided for review. MIP images are provided for review.  Automated exposure control, iterative reconstruction, and/or weight based adjustment of the mA/kV was utilized to reduce the radiation dose to as low as reasonably achievable.; CTA of the neck was performed with the administration of intravenous contrast. Multiplanar reformatted images are provided for review. MIP images are provided for review. Stenosis of the internal carotid arteries measured using NASCET criteria. Automated exposure control, iterative reconstruction, and/or weight based adjustment of the mA/kV was utilized to reduce the radiation dose to as low as reasonably achievable.; CT of the head was performed without the administration of intravenous contrast. Automated exposure control, iterative reconstruction, and/or weight based adjustment of the mA/kV was utilized to reduce the radiation dose to as low as reasonably achievable. Noncontrast CT of the head with reconstructed 2-D images are also provided for review. COMPARISON: None. HISTORY: ORDERING SYSTEM PROVIDED HISTORY: rigth side numbness and aphasia TECHNOLOGIST PROVIDED HISTORY: Reason for exam:->rigth side numbness and aphasia Has a \"code stroke\" or \"stroke alert\" been called? ->Yes Decision Support Exception - unselect if not a suspected or confirmed emergency medical condition->Emergency Medical Condition ([place]) What reading provider will be dictating this exam?->CRC; ORDERING SYSTEM PROVIDED HISTORY: right side numbness and aphasia TECHNOLOGIST PROVIDED HISTORY: Reason for exam:->right side numbness and aphasia Has a \"code stroke\" or \"stroke alert\" been called? ->Yes Decision Support Exception - unselect if not a suspected or confirmed emergency medical condition->Emergency Medical Condition ([place]) What reading provider will be dictating this exam?->CRC FINDINGS: CT HEAD: BRAIN/VENTRICLES:  No acute intracranial hemorrhage or extraaxial fluid collection. Grey-white differentiation is maintained. No evidence of mass, mass effect or midline shift. No evidence of hydrocephalus. ORBITS: The visualized portion of the orbits demonstrate no acute abnormality. SINUSES:  The visualized paranasal sinuses and mastoid air cells demonstrate no acute abnormality.  SOFT TISSUES/SKULL: No acute abnormality of the visualized skull or soft tissues. CTA NECK: AORTIC ARCH/ARCH VESSELS: No dissection or arterial injury. No significant stenosis of the brachiocephalic or subclavian arteries. CAROTID ARTERIES: No dissection, arterial injury, or hemodynamically significant stenosis by NASCET criteria. VERTEBRAL ARTERIES: No dissection, arterial injury, or significant stenosis. SOFT TISSUES: The lung apices are clear. No cervical or superior mediastinal lymphadenopathy. The larynx and pharynx are unremarkable. No acute abnormality of the salivary and thyroid glands. BONES: No acute osseous abnormality. CTA HEAD: ANTERIOR CIRCULATION: No significant stenosis of the intracranial internal carotid, anterior cerebral, or middle cerebral arteries. No aneurysm. POSTERIOR CIRCULATION: No significant stenosis of the vertebral, basilar, or posterior cerebral arteries. No aneurysm. OTHER: No dural venous sinus thrombosis on this non-dedicated study. CT PERFUSION: EXAM QUALITY: The examination is diagnostic with appropriate arterial inflow and venous outflow curves, and diagnostic perfusion maps. CORE INFARCT: The total area of ischemic core is 0 mL (CBF<30% volume). TOTAL HYPOPERFUSION: The total area of hypoperfusion is 0 mL (Tmax>6s volume). PENUMBRA: The penumbra (mismatch) volume is 0 mL and is located in the n/a. The mismatch ratio is n/a.     1. No acute intracranial abnormality on noncontrast head CT. 2. No perfusion mismatch. 3. Unremarkable CTA of the head and neck. MRI BRAIN WO CONTRAST    Result Date: 2/4/2023  EXAMINATION: MRI OF THE BRAIN WITHOUT CONTRAST  2/4/2023 9:02 am TECHNIQUE: Multiplanar multisequence MRI of the brain was performed without the administration of intravenous contrast. COMPARISON: None. HISTORY: ORDERING SYSTEM PROVIDED HISTORY: TIA- sx: R sided weakness, aphasia TECHNOLOGIST PROVIDED HISTORY: Reason for exam:->TIA- sx: R sided weakness, aphasia What reading provider will be dictating this exam?->CRC Initial evaluation. FINDINGS: INTRACRANIAL STRUCTURES/VENTRICLES: There is no acute infarct. No mass effect or midline shift. No evidence of an acute intracranial hemorrhage. The ventricles and sulci are normal in size and configuration. There is perhaps a partial empty sella. The normal signal voids within the major intracranial vessels appear maintained. There is minimal cerebellar tonsillar ectopia. ORBITS: The visualized portion of the orbits demonstrate no acute abnormality. SINUSES: The visualized paranasal sinuses and mastoid air cells demonstrate no acute abnormality. BONES/SOFT TISSUES: The bone marrow signal intensity appears normal. The soft tissues demonstrate no acute abnormality. 1. No acute intracranial abnormality. No acute infarct. 2. Minimal cerebellar tonsillar ectopia. The patient's records from referring provider and available information in the EHR was reviewed. Impression:  TIA: Based on clinical suspicion presentation. This was concerning but a negative work-up. Patient does have stroke risk factors of HTN, HLD, and CHF with mitral valve prolapse. Sided paresthesia: Acute onset and now resolved. ***      Principal Problem:    Stroke-like symptoms  Active Problems:    TIA (transient ischemic attack)  Resolved Problems:    * No resolved hospital problems. *      Recommendations:                                            Continue dual antiplatelet therapy with aspirin and Plavix for 21 days followed by aspirin monotherapy. Cardiology follow-up for CHF  Plan encourage routine exercise therapy.  ***. It was my pleasure to evaluate Monique Reid today. Please call with questions.       Electronically signed by Manny Galdamez MD on 2/4/2023 at 10:13 AM

## 2023-02-04 NOTE — PROGRESS NOTES
Neuro Inpatient Consult Note       Heather Harp is a 59 y.o. right handed female    Neurology was consulted for TIA--paresthesias    Patient has been hypertensive this entire admission otherwise vitals are stable on room air    There is no family present at bedside    Past Medical History:     Past Medical History:   Diagnosis Date    Abnormal carotid pulse 3/30/2017    Asthma     Depression     Dyslipidemia     Hypertension     Mitral valve prolapse     Obesity     Osteoarthritis        Past Surgical History:     Past Surgical History:   Procedure Laterality Date    ABDOMEN SURGERY      CARDIAC CATHETERIZATION  12/07/2022    Dr. Dany Goss (CERVIX STATUS UNKNOWN)  01/01/2000    INCISIONAL HERNIA REPAIR  04/29/2014    Dr. Zakia Lerma  childhood       Allergies:     Doxycycline, Iodine, Toradol [ketorolac tromethamine], Bidil [isosorb dinitrate-hydralazine], and Iodides    Medications:     Prior to Admission medications    Medication Sig Start Date End Date Taking?  Authorizing Provider   carvedilol (COREG) 25 MG tablet Take 1 tablet by mouth 2 times daily (with meals) 1/17/23   Noa Boot, DO   furosemide (LASIX) 20 MG tablet Take 1 tablet by mouth daily 1/17/23   Noa Boot, DO   rosuvastatin (CRESTOR) 20 MG tablet Take 1 tablet by mouth nightly 1/17/23   Noa Boot, DO   sacubitril-valsartan (ENTRESTO)  MG per tablet Take 1 tablet by mouth 2 times daily 1/17/23   Noa Boot, DO   spironolactone (ALDACTONE) 25 MG tablet Take 1 tablet by mouth daily 1/17/23   DENI Hawkins - CNP   albuterol sulfate HFA (PROVENTIL;VENTOLIN;PROAIR) 108 (90 Base) MCG/ACT inhaler Inhale 2 puffs into the lungs every 6 hours as needed for Wheezing or Shortness of Breath 11/2/22   Hakeem Belt, DO   cetirizine (ZYRTEC) 10 MG tablet Take 1 tablet by mouth daily as needed for Allergies 11/2/22   Palmyra Belt, DO       Social History:     Denies ETOH, tobacco, or illicit drugs    Review of Systems:     (+) Right paresthesias--- resolved  (+) Word finding difficulty--improved  No chest pain or palpitations  No SOB  No vertigo, lightheadedness or loss of consciousness  No falls, tripping or stumbling  No incontinence of bowels or bladder  No itching or bruising appreciated  No numbness, tingling or focal arm/leg weakness    ROS is otherwise negative     Family History:     Family History   Problem Relation Age of Onset    Asthma Mother     High Blood Pressure Mother     Diabetes Mother     Heart Disease Mother     Arthritis Mother     Stroke Mother     High Blood Pressure Father     Diabetes Father     Cancer Father     Mental Illness Father     Stroke Father     Asthma Sister     High Blood Pressure Sister     High Blood Pressure Sister     Mental Illness Sister     High Blood Pressure Sister     Arthritis Sister     High Blood Pressure Sister     Diabetes Sister         History of Present Illness:     Patient presented to ED on 2/3 with shortness of breath, difficulty speaking, right-sided paresthesias and lightheadedness. Patient was subsequently seen in the ED 30 to 45 minutes after onset of symptoms. Stroke alert was called with a NIH of 2. Patient's initial BP was 168/106. CT head was negative for acute pathology. CTA head and neck was unrevealing for LVO or high-grade stenosis. Perfusion showed no mismatch. MRI showed no acute abnormality; minimal cerebellar tonsillar ectopia. Patient was not considered for tPA due to low NIH and negative neuroimaging. Patient was started on aspirin and Plavix with admission for neuro evaluation. Since admission MRI brain completed this morning was negative for acute pathology. On chart review patient had complete echo done in November which showed EF of 40 to 45%; stage II diastolic dysfunction and areas of hypokinetic walls.     Objective:     BP (!) 184/94   Pulse 82   Temp 98 °F (36.7 °C) (Oral)   Resp 16 Ht 5' 5\" (1.651 m)   Wt 255 lb 11.7 oz (116 kg)   SpO2 95%   BMI 42.56 kg/m²     General appearance: alert, appears stated age, cooperative and no distress  Head: normocephalic, without obvious abnormality, atraumatic  Eyes: conjunctivae/corneas clear. .  Neck: + R carotid bruit--prominent, supple, symmetrical, trachea midline   Lungs: Unlabored breaths on room air  Heart: regular rate and rhythm  Extremities: normal, atraumatic, no cyanosis or edema  Pulses: 2+ and symmetric  Skin: color, texture, turgor normal---no rashes or lesions    Mental Status: alert, oriented x4, thought content appropriate.   Follows commands well, pleasant and conversant    Appropriate attention/concentration  Intact fundus of knowledge  Memories intact    Speech: no dysarthria  Language: no aphasias--does well with reading, object identification and repetition    Cranial Nerves:  I: smell    II: visual acuity     II: visual fields Full to confrontation bilaterally   II: pupils PERRL   III,VII: ptosis None   III,IV,VI: extraocular muscles  EOMI without nystagmus    V: mastication Normal   V: facial light touch sensation  Normal   V,VII: corneal reflex     VII: facial muscle function - upper  Normal   VII: facial muscle function - lower Normal   VIII: hearing Normal   IX: soft palate elevation  Normal   IX,X: gag reflex    XI: trapezius strength  5/5   XI: sternocleidomastoid strength 5/5   XI: neck extension strength  5/5   XII: tongue strength  Normal     Motor:  5/5 throughout  Normal bulk and tone   No drift  No abnormal movements    Sensory:  LT and PP normal  Vibration normal    Coordination:   FN, FFM and JAQUELIN normal  HS normal    Gait:  Stable gait; walks cautiously    DTR:   Equal and symmetric    No Babinskis  No Catherine's     No other pathological reflexes    Laboratory/Radiology:     CBC with Differential:    Lab Results   Component Value Date/Time    WBC 4.9 02/03/2023 10:14 AM    RBC 3.64 02/03/2023 10:14 AM    HGB 10.7 02/03/2023 10:14 AM    HCT 31.8 02/03/2023 10:14 AM     02/03/2023 10:14 AM    MCV 87.4 02/03/2023 10:14 AM    MCH 29.4 02/03/2023 10:14 AM    MCHC 33.6 02/03/2023 10:14 AM    RDW 12.3 02/03/2023 10:14 AM    SEGSPCT 78 12/06/2013 01:16 PM    LYMPHOPCT 45.5 02/03/2023 10:14 AM    MONOPCT 8.8 02/03/2023 10:14 AM    BASOPCT 0.6 02/03/2023 10:14 AM    MONOSABS 0.43 02/03/2023 10:14 AM    LYMPHSABS 2.23 02/03/2023 10:14 AM    EOSABS 0.30 02/03/2023 10:14 AM    BASOSABS 0.03 02/03/2023 10:14 AM     CMP:    Lab Results   Component Value Date/Time     02/03/2023 10:14 AM    K 4.1 02/03/2023 10:14 AM     02/03/2023 10:14 AM    CO2 25 02/03/2023 10:14 AM    BUN 18 02/03/2023 10:14 AM    CREATININE 0.9 02/03/2023 10:14 AM    GFRAA >60 03/13/2015 06:10 AM    LABGLOM >60 02/03/2023 10:14 AM    GLUCOSE 95 02/03/2023 10:18 AM    GLUCOSE 91 03/09/2012 10:43 AM    PROT 7.0 11/08/2022 10:44 PM    LABALBU 3.7 11/08/2022 10:44 PM    LABALBU 4.3 03/09/2012 10:43 AM    CALCIUM 9.3 02/03/2023 10:14 AM    BILITOT 0.4 11/08/2022 10:44 PM    ALKPHOS 89 11/08/2022 10:44 PM    AST 17 11/08/2022 10:44 PM    ALT 15 11/08/2022 10:44 PM     U/A:    Lab Results   Component Value Date/Time    COLORU Yellow 02/03/2023 10:35 AM    PROTEINU Negative 02/03/2023 10:35 AM    PHUR 7.5 02/03/2023 10:35 AM    WBCUA NONE 02/03/2023 10:35 AM    RBCUA NONE 02/03/2023 10:35 AM    RBCUA NONE 12/06/2013 12:30 PM    BACTERIA NONE SEEN 02/03/2023 10:35 AM    CLARITYU Clear 02/03/2023 10:35 AM    SPECGRAV 1.015 02/03/2023 10:35 AM    LEUKOCYTESUR Negative 02/03/2023 10:35 AM    UROBILINOGEN 0.2 02/03/2023 10:35 AM    BILIRUBINUR Negative 02/03/2023 10:35 AM    BLOODU Negative 02/03/2023 10:35 AM    GLUCOSEU Negative 02/03/2023 10:35 AM    AMORPHOUS MANY 11/09/2022 01:55 AM     HgBA1c:    Lab Results   Component Value Date/Time    LABA1C 5.5 02/03/2023 10:14 AM     FLP:    Lab Results   Component Value Date/Time    TRIG 71 02/03/2023 10:14 AM    HDL 66 02/03/2023 10:14 AM    LDLCALC 77 02/03/2023 10:14 AM    LABVLDL 14 02/03/2023 10:14 AM     TSH:    Lab Results   Component Value Date/Time    TSH 1.920 02/03/2023 10:14 AM     MRI BRAIN WO CONTRAST   Final Result   1. No acute intracranial abnormality. No acute infarct. 2. Minimal cerebellar tonsillar ectopia. XR CHEST PORTABLE   Final Result   1. There is no acute cardiopulmonary disease. CT HEAD WO CONTRAST   Final Result   1. No acute intracranial abnormality on noncontrast head CT. 2. No perfusion mismatch. 3. Unremarkable CTA of the head and neck. CTA NECK W CONTRAST   Final Result   1. No acute intracranial abnormality on noncontrast head CT. 2. No perfusion mismatch. 3. Unremarkable CTA of the head and neck. CTA HEAD W CONTRAST   Final Result   1. No acute intracranial abnormality on noncontrast head CT. 2. No perfusion mismatch. 3. Unremarkable CTA of the head and neck. CT BRAIN PERFUSION   Final Result   1. No acute intracranial abnormality on noncontrast head CT. 2. No perfusion mismatch. 3. Unremarkable CTA of the head and neck. Complete echo 11/1/2022:   Mildly dilated left ventricle. Ejection fraction is visually estimated at 40-45%. Overall ejection fraction mild-to-moderately decreased . Anterior and anterolateral walls are hypokinetic. Moderate concentric left ventricular hypertrophy. Stage II diastolic dysfunction. Technically difficult examination. Definity echo contrast was used to   delineate endocardial borders.     I independently reviewed all pertinent labs and imaging studies today    Assessment:     Presented with complaint of shortness of breath, lightheaded and right-sided paresthesias   Of note patient also reported difficulty speaking described as hard to get words out   NIH in ED was 2 for sensory and aphasia   CT head, CTA head and neck and perfusion studies were unrevealing   MRI brain completed this morning showed no acute pathology   Of note has been hypertensive this entire admission    Hypertension   Started on lsoso/hydral (Bidil) 20-37 yesterday evening prior to arrival    Chest pain, headache, weakness, dizziness, palpitations, tachycardia, paresthesias and diaphoresis all are listed as adverse reactions of this medication and most likely contributing to patient's presentation   Cardiology has evaluated and plans to change this medication    I suspect patient's presentation is most likely from hypertensive urgency, side effects of new medication as well as the recent passing of her father (on yesterday). Patient also having family issues with her 3 other siblings as a relates to her father's passing. This was explained to patient at bedside and all questions and concerns addressed today. Plan:     Continue supportive care  BP control--goal normotensive  Discontinue BiDil--cardiology to change medications  Continue aspirin, Plavix and high intensity statin  PT/OT evaluations pending    Okay to discharge patient after lunch once medically cleared and okay with others. Neurology will sign off.     DENI Robins   11:25 AM  2/4/2023

## 2023-02-04 NOTE — PROGRESS NOTES
Physical Therapy  Physical Therapy Initial Assessment     Name: Heathre Harp  : 1965  MRN: [id number]      Date of Service: 2023    Evaluating PT:  Leelee Gamino PT, DPT [id number]    Room #:  6852/7349-N  Diagnosis:  Stroke-like symptoms [R29.90]  TIA (transient ischemic attack) [G45.9]  PMHx/PSHx:    Past Medical History:   Diagnosis Date    Abnormal carotid pulse 3/30/2017    Asthma     Depression     Dyslipidemia     Hypertension     Mitral valve prolapse     Obesity     Osteoarthritis      Procedure/Surgery:  None  Precautions:  Falls  Equipment Needs:  TBD    SUBJECTIVE:    Pt lives with daughter in a 2 story home with 3 stairs to enter and 1 rail. Full flight of steps and 1 rail to bedroom. Pt ambulated without device and was independent PTA. OBJECTIVE:   Initial Evaluation  Date: 23 Treatment Short Term/ Long Term   Goals   AM-PAC 6 Clicks 93/58     Was pt agreeable to Eval/treatment? Yes     Does pt have pain?  Chronic R shoulder pain but no rating given     Bed Mobility  Rolling: NT  Supine to sit: Mod Independent  Sit to supine: NT  Scooting: Independent     Transfers Sit to stand: SBA  Stand to sit: SBA  Stand pivot: SBA no device  Independent   Ambulation   150 feet with SBA no device  >400 feet Independently   Stair negotiation: ascended and descended NT  >10 steps with 1 rail Mod Independent   ROM BUE:  WFL  BLE:  WFL     Strength BUE:  WFL  BLE:  WFL  Increase by 1/3 MMT grade   Balance Sitting EOB:  Independent  Dynamic Standing:  SBA no device  Sitting EOB:  -  Dynamic Standing:  Independent     Pt is A & O x 4  Sensation:  R hand paresthesias  Edema:  none    Therapeutic Exercises:  NA    Patient education  Pt educated on safety    Patient response to education:   Pt verbalized understanding Pt demonstrated skill Pt requires further education in this area   x x x     ASSESSMENT:    Conditions Requiring Skilled Therapeutic Intervention:    []Decreased strength     []Decreased ROM  [x]Decreased functional mobility  [x]Decreased balance   [x]Decreased endurance   []Decreased posture  []Decreased sensation  []Decreased coordination   []Decreased vision  []Decreased safety awareness   []Increased pain       Comments:  Pt was in bed upon arrival, agreeable to initial evaluation. Pt ambulated with decreased gait speed and step-through gait pattern. Pt utilized hallway railing reporting she typically reaches for environment. Mild SOB and lightheadedness reported with activity. Pt was left in chair with all needs met and call light in reach. Pt inquired about a cane. Educated pt on device if needed. Treatment:  Patient practiced and was instructed in the following treatment:    Transfer training - pt was given verbal a cues to facilitate proper hand placement, technique and safety during sit to stand, stand to sit and stand pivot transfers. Gait training- pt was given verbal cues to facilitate safety during ambulation. Pt's/ family goals   1. Return home    Prognosis is good for reaching above PT goals.     Patient and or family understand(s) diagnosis, prognosis, and plan of care:   [x] Yes [] No     PHYSICAL THERAPY PLAN OF CARE:    PT POC is established based on physician order and patient diagnosis     Referring provider/PT Order:  Ramy Ponce MD /02/03/23 1400 PT eval and treat  Diagnosis:  Stroke-like symptoms [R29.90]  TIA (transient ischemic attack) [G45.9]  Specific instructions for next treatment:  Progress activity    Current Treatment Recommendations:     [x] Strengthening to improve independence with functional mobility   [] ROM to improve independence with functional mobility   [x] Balance Training to improve static/dynamic balance and to reduce fall risk  [x] Endurance Training to improve activity tolerance during functional mobility   [x] Transfer Training to improve safety and independence with all functional transfers   [x] Gait Training to improve gait mechanics, endurance and asses need for appropriate assistive device  [x] Stair Training in preparation for safe discharge home and/or into the community   [] Positioning to prevent skin breakdown and contractures  [x] Safety and Education Training   [x] Patient/Caregiver Education   [] HEP  [] Other     PT long term treatment goals are located in above grid    Frequency of treatments: 2-5x/week x 1-2 weeks. Time in  1035  Time out  1050    Total Treatment Time  - minutes     Evaluation Time includes thorough review of current medical information, gathering information on past medical history/social history and prior level of function, completion of standardized testing/informal observation of tasks, assessment of data and education on plan of care and goals.     CPT codes:  [x] Low Complexity PT evaluation 94059  [] Moderate Complexity PT evaluation 65455  [] High Complexity PT evaluation 09970  [] PT Re-evaluation 69507  [] Gait training 29723 - minutes  [] Manual therapy 97898 - minutes  [] Therapeutic activities 67083 - minutes  [] Therapeutic exercises 29931 - minutes  [] Neuromuscular reeducation 38660 - minutes     Rishi Bethea, PT, DPT  [id number]

## 2023-02-04 NOTE — PROGRESS NOTES
[facility]  Internal Medicine Residency Program  Progress Note - House Team 1    Patient:  Pan Herrera 59 y.o. female MRN: [id number]     Date of Service: 2/4/2023     CC: SOB, paraesthesias, aphasia   Overnight events:  after hearing news of father passing, 5mg labetalol     Subjective     Pt sitting at edge of bed. States she did not sleep well last night due to ambient noise and light in room. She talked about hoping to get back to work as the  at [employer] soon. She stated multiple times that she does not want to go back to taking \"that little red pill\" (BiDil) and asked if Dr. Sharmila Yanes is going to start her on norvasc. Objective     Physical Exam:  Vitals: BP (!) 167/84   Pulse 77   Temp 98.4 °F (36.9 °C) (Oral)   Resp 16   Ht 5' 5\" (1.651 m)   Wt 255 lb 11.7 oz (116 kg)   SpO2 95%   BMI 42.56 kg/m²     I & O - 24hr: No intake/output data recorded. Physical Exam  Constitutional:       Appearance: She is obese. HENT:      Head: Normocephalic and atraumatic. Mouth/Throat:      Mouth: Mucous membranes are moist.      Pharynx: Oropharynx is clear. Eyes:      Conjunctiva/sclera: Conjunctivae normal.   Cardiovascular:      Rate and Rhythm: Normal rate and regular rhythm. Pulses: Normal pulses. Heart sounds: Normal heart sounds. Pulmonary:      Effort: Pulmonary effort is normal.      Breath sounds: Normal breath sounds. Abdominal:      General: Abdomen is flat. Bowel sounds are normal. There is no distension. Palpations: Abdomen is soft. Tenderness: There is no abdominal tenderness. Musculoskeletal:      Right lower leg: Edema (trace) present. Left lower leg: Edema (trace) present. Skin:     General: Skin is warm and dry. Neurological:      Mental Status: She is alert and oriented to person, place, and time.    Psychiatric:         Mood and Affect: Mood normal.         Behavior: Behavior normal.         Thought Content: Thought content normal.         Judgment: Judgment normal.        Pertinent Labs & Imaging Studies   Subjective    CBC with Differential:    Lab Results   Component Value Date/Time    WBC 4.9 02/03/2023 10:14 AM    RBC 3.64 02/03/2023 10:14 AM    HGB 10.7 02/03/2023 10:14 AM    HCT 31.8 02/03/2023 10:14 AM     02/03/2023 10:14 AM    MCV 87.4 02/03/2023 10:14 AM    MCH 29.4 02/03/2023 10:14 AM    MCHC 33.6 02/03/2023 10:14 AM    RDW 12.3 02/03/2023 10:14 AM    SEGSPCT 78 12/06/2013 01:16 PM    LYMPHOPCT 45.5 02/03/2023 10:14 AM    MONOPCT 8.8 02/03/2023 10:14 AM    BASOPCT 0.6 02/03/2023 10:14 AM    MONOSABS 0.43 02/03/2023 10:14 AM    LYMPHSABS 2.23 02/03/2023 10:14 AM    EOSABS 0.30 02/03/2023 10:14 AM    BASOSABS 0.03 02/03/2023 10:14 AM     BMP:    Lab Results   Component Value Date/Time     02/03/2023 10:14 AM    K 4.1 02/03/2023 10:14 AM     02/03/2023 10:14 AM    CO2 25 02/03/2023 10:14 AM    BUN 18 02/03/2023 10:14 AM    LABALBU 3.7 11/08/2022 10:44 PM    LABALBU 4.3 03/09/2012 10:43 AM    CREATININE 0.9 02/03/2023 10:14 AM    CALCIUM 9.3 02/03/2023 10:14 AM    GFRAA >60 03/13/2015 06:10 AM    LABGLOM >60 02/03/2023 10:14 AM    GLUCOSE 95 02/03/2023 10:18 AM    GLUCOSE 91 03/09/2012 10:43 AM     HgBA1c:    Lab Results   Component Value Date/Time    LABA1C 5.5 02/03/2023 10:14 AM       Resident's Assessment and Plan     Arian Cortez is a 59 y.o. female    Assessment and Plan:     TIA 2/2 hypotension following new medication   Took all medications including new med BiDil at 8am, started having symptoms at 8:30, 8:45   Sx: shortness of breath, paraesthesias, aphasia  Symptoms improving by time of evaluation   ED:   NIHSS: 2   /106  ASA and plavix   CT, CTA head and neck, CT perfusion: all WNL, no acute abnormality or perfusion mismatch   Recent echo 10/30/22: \"interatrial septum appears intact\"  Never smoked   Hx of HLD  and HTN   No family hx of stroke   Lipid panel:  Total 157,LDL 77  A1c 5.5  PLAN:  Crestor increased to 40   ASA   Plavix 21 days   Neurology consulted   MRI brain wo contrast   HFmEF: EF of 45%, on GDMT  Follows with Dr Spencer White   Recent visit on 1/31 with plan to start BiDil   Patient took BiDil for the first time just prior to symptoms starting   Meds: Coreg 25 BID, Entresto BID, Aldactone 25 daily, Lasix 20 daily  Echo 10/30/22: EF 45%  Cardiac cath 12/1/22  PLAN: Continue home meds, hold BiDil   Tension headache  Tylenol as needed   HTN - chronic   /106 in ED   Continue home meds  BP elevated overnight after hearing news of father passing, PRN labetalol   Dr. Spencer White, start norvasc? PLAN:   Monitor BP  PRN labetalol   Confirm coreg with Dr. Jaylene Mariano of breath following taking bidil  Allergy? Has had similar sx when taking doxycycline   100% on RA   S/p solumedrol in ED  Troponin: 7   Chronic - Asthma, albuterol 1x a week   Continue PRN albuterol   Pt states she cannot afford the other inhaler she has been prescribed   HLD - chronic   Home med: Crestor 20 , increased to 40  Total cholesterol: 157  LDL: 77  Hx of mitral regurg? 10/30/22 with normal mitral valve structure and function  Obesity   OA of knee     PT/OT evaluation: Ordered  DVT prophylaxis/ GI prophylaxis: Lovenox/ Normal diet    Disposition: admit to internal medicine     Larry Granados MD, PGY-1  Attending physician: Dr. Min Alonso  Internal Medicine Residency Program  Attending Physician Statement:  Umer Garcia M.D., F.A.C.P. I have discussed the case, including pertinent history and exam findings with the resident. I have seen and examined the patient--agree with the resident assessment of ROS, PMHx, PSHx, meds reviewed and assessment--Remainder of medical problems as per resident note. Hospital charts reviewed, including other providers notes, relevant labs and imaging.    >50% of time spent coordinating care with residents, other providers and/or counseling patient   re: workup/plan and orders as documented by the resident   For billing--Using Medical decision making level of complexity and time spent on case      TIA vs drug reaction vs emotional/panik attack/decompensation  Social  issues  +dad just   New bidil med    Very high BP at home, likely hypertensive encephaloapthy now   Mri didn't show cva  Neuro input    CVA workup complete  Carotids, no afib, recent echo  BP control--goal normotensive  Discontinue BiDil--cardiology to change medications  Continue aspirin, Plavix and high intensity statin  Plan norvasc on DC - she doesn't want to go back on bidil  Dc time >30min

## 2023-02-04 NOTE — DISCHARGE INSTRUCTIONS
Internal medicine    Follow ups  Please follow up with the internal medicine clinic at [facility] appointment will be scheduled for you some time in the next 7 days. The clinich will call you to inform you of your appointment. Please keep all other follow up appointments:  Future Appointments   Date Time Provider Yesica Bryant   2/7/2023  8:30 AM [facility] ROOM 1 [facility]   2/21/2023 10:45 AM Omar Arambula MD [facility]   5/1/2023 10:00 AM Marlin Subramanian MD [facility]       Changes in healthcare   Please take all medications as indicated  Diet: regular diet   Activity: activity as tolerated  New Medications started during this hospital stay  Norvasc 5 mg daily   Aspirin 81 mg daily   Plavix (clopidogrel 75mg) daily for 3 weeks   Medications you should stop taking   BiDil (Isosorbide dinitrate / Hydralazine)  Please contact us if you have any concerns, wish to change or make an appointment:  Internal medicine clinic   Phone: [phone]  Fax: [phone] [place] [address] [address]  Should you have further questions in regards to this visit, you can review your clinical note and after visit summary document on your [employer] account. Other than any new prescriptions given to you today, the list of home medications on this After Visit Summary are based on information provided to us from you and your healthcare providers. This information, including the list, dose, and frequency of medications is only as accurate as the information you provided. If you have any questions or concerns about your home medications, please contact your Primary Care Physician for further clarification.

## 2023-02-04 NOTE — PLAN OF CARE
Problem: Pain  Goal: Verbalizes/displays adequate comfort level or baseline comfort level  2/4/2023 1114 by Chang Jurado RN  Outcome: Progressing  2/3/2023 2137 by Coretta Hamman Call, RN  Outcome: Progressing

## 2023-02-04 NOTE — PROGRESS NOTES
OT SESSION ATTEMPT     Date:2023  Patient Name: Kaya Lagunas  MRN: [id number]  : 1965  Room: [address] [place] [address]     Occupational therapy orders received/chart review completed and OT session attempted this date. Pt off unit at MRI. Will reattempt OT eval at a later time/date.     Tayo Guerrier, [address] OTR/L #[id number]

## 2023-02-06 ENCOUNTER — TELEPHONE (OUTPATIENT)
Dept: INTERNAL MEDICINE | Age: 58
End: 2023-02-06

## 2023-02-06 NOTE — TELEPHONE ENCOUNTER
----- Message from [address] sent at 2/6/2023 10:16 AM EST -----  Subject: Hospital Follow Up    QUESTIONS  What hospital was the Patient Discharged from? [facility]  Date of Discharge? 2023-02-04  Discharge Location? Home  Reason for hospitalization as patient stated? Reaction to medication  What question does the patient have, if applicable? Needs to set up a f/u   as soon as possible. Thank you   ---------------------------------------------------------------------------  --------------  CALL BACK INFO  What is the best way for the office to contact you? OK to leave message on   voicemail  Preferred Call Back Phone Number? [phone]  ---------------------------------------------------------------------------  --------------  SCRIPT ANSWERS  Relationship to Patient?  Self

## 2023-02-07 ENCOUNTER — OFFICE VISIT (OUTPATIENT)
Dept: INTERNAL MEDICINE | Age: 58
End: 2023-02-07
Payer: COMMERCIAL

## 2023-02-07 ENCOUNTER — HOSPITAL ENCOUNTER (OUTPATIENT)
Dept: OTHER | Age: 58
Setting detail: THERAPIES SERIES
Discharge: HOME | End: 2023-02-07
Payer: COMMERCIAL

## 2023-02-07 VITALS
HEIGHT: 65 IN | HEART RATE: 76 BPM | OXYGEN SATURATION: 98 % | TEMPERATURE: 97.1 F | DIASTOLIC BLOOD PRESSURE: 81 MMHG | BODY MASS INDEX: 44.25 KG/M2 | RESPIRATION RATE: 18 BRPM | SYSTOLIC BLOOD PRESSURE: 138 MMHG | WEIGHT: 265.6 LBS

## 2023-02-07 VITALS
HEART RATE: 67 BPM | WEIGHT: 264 LBS | BODY MASS INDEX: 43.93 KG/M2 | RESPIRATION RATE: 18 BRPM | DIASTOLIC BLOOD PRESSURE: 72 MMHG | SYSTOLIC BLOOD PRESSURE: 148 MMHG | OXYGEN SATURATION: 97 %

## 2023-02-07 DIAGNOSIS — G45.9 TIA (TRANSIENT ISCHEMIC ATTACK): Primary | ICD-10-CM

## 2023-02-07 LAB
ANION GAP SERPL CALCULATED.3IONS-SCNC: 6 MMOL/L (ref 7–16)
BUN BLDV-MCNC: 16 MG/DL (ref 6–20)
CALCIUM SERPL-MCNC: 9 MG/DL (ref 8.6–10.2)
CHLORIDE BLD-SCNC: 108 MMOL/L (ref 98–107)
CO2: 26 MMOL/L (ref 22–29)
CREAT SERPL-MCNC: 0.9 MG/DL (ref 0.5–1)
GFR SERPL CREATININE-BSD FRML MDRD: >60 ML/MIN/1.73
GLUCOSE BLD-MCNC: 98 MG/DL (ref 74–99)
POTASSIUM SERPL-SCNC: 3.8 MMOL/L (ref 3.5–5)
PRO-BNP: 264 PG/ML (ref 0–125)
SODIUM BLD-SCNC: 140 MMOL/L (ref 132–146)

## 2023-02-07 PROCEDURE — 99213 OFFICE O/P EST LOW 20 MIN: CPT

## 2023-02-07 PROCEDURE — 3017F COLORECTAL CA SCREEN DOC REV: CPT

## 2023-02-07 PROCEDURE — 1036F TOBACCO NON-USER: CPT

## 2023-02-07 PROCEDURE — G8482 FLU IMMUNIZE ORDER/ADMIN: HCPCS

## 2023-02-07 PROCEDURE — 36415 COLL VENOUS BLD VENIPUNCTURE: CPT

## 2023-02-07 PROCEDURE — G8417 CALC BMI ABV UP PARAM F/U: HCPCS

## 2023-02-07 PROCEDURE — 3078F DIAST BP <80 MM HG: CPT

## 2023-02-07 PROCEDURE — 83880 ASSAY OF NATRIURETIC PEPTIDE: CPT

## 2023-02-07 PROCEDURE — 3074F SYST BP LT 130 MM HG: CPT

## 2023-02-07 PROCEDURE — 80048 BASIC METABOLIC PNL TOTAL CA: CPT

## 2023-02-07 PROCEDURE — 99214 OFFICE O/P EST MOD 30 MIN: CPT

## 2023-02-07 PROCEDURE — G8427 DOCREV CUR MEDS BY ELIG CLIN: HCPCS

## 2023-02-07 ASSESSMENT — ENCOUNTER SYMPTOMS
ABDOMINAL PAIN: 0
SHORTNESS OF BREATH: 0
BLOOD IN STOOL: 0
CHEST TIGHTNESS: 0
SORE THROAT: 0

## 2023-02-07 NOTE — PATIENT INSTRUCTIONS
Call Dr. Borja's office to schedule office visit sooner to discuss recent reaction Bidil. Please continue to take all medications as prescribed. Referral to neurology has been made, you will receive a call to schedule an appointment.

## 2023-02-07 NOTE — PROGRESS NOTES
[facility] Internal Medicine      SUBJECTIVE:  Inessa Cantu (:  1965) is a 59 y.o. female here for evaluation of the following chief complaint(s):  Medication Problem (Took red pill for B/P within 45 minutes had symptoms of dizziness , lightheadedness not able to breathe well  went to ER office )    Last seen in clinic on 23. Saw Dr. Autumn Martel on 23, started patient on Bidil 20/37 .5 mg po 3 times a day for LV dysfunction. Was admitted to the hospital on 2/3 to 23 for TIA vs hypertensive urgency. Patient had paraesthesias and aphasia 30-45 minutes after taking Bidil for first time, which prompted her to go to the ED. CT head and neck were negative. MR brain negative. Started on ASA and Plavix for 21 days following discharge. Started on Norvasc 5 mg during admission as well. Here for follow up from recent admission. Was advised to follow up with Dr. Autumn Martel as well. Denies any paraesthesias. States speech is improving. Next appointment with Dr. Autumn Martel is on 23. Next IM clinic follow up is on 23. Review of Systems   Constitutional:  Negative for chills and fever. HENT:  Positive for congestion (chronic). Negative for sore throat. Respiratory:  Negative for chest tightness and shortness of breath. Cardiovascular:  Negative for chest pain and leg swelling. Gastrointestinal:  Negative for abdominal pain and blood in stool. Genitourinary:  Negative for difficulty urinating and hematuria. Musculoskeletal:  Negative for arthralgias and myalgias. Skin:  Negative for rash and wound. Neurological:  Negative for dizziness, tremors, syncope, facial asymmetry, weakness, numbness and headaches.      Current Outpatient Medications on File Prior to Visit   Medication Sig Dispense Refill    aspirin 81 MG chewable tablet Take 1 tablet by mouth daily 30 tablet 3    rosuvastatin (CRESTOR) 40 MG tablet Take 1 tablet by mouth nightly 90 tablet 1    amLODIPine (NORVASC) 5 MG tablet Take 1 tablet by mouth daily 90 tablet 1    clopidogrel (PLAVIX) 75 MG tablet Take 1 tablet by mouth daily for 20 doses 20 tablet 0    carvedilol (COREG) 25 MG tablet Take 1 tablet by mouth 2 times daily (with meals) 180 tablet 1    furosemide (LASIX) 20 MG tablet Take 1 tablet by mouth daily 90 tablet 1    sacubitril-valsartan (ENTRESTO)  MG per tablet Take 1 tablet by mouth 2 times daily 180 tablet 1    spironolactone (ALDACTONE) 25 MG tablet Take 1 tablet by mouth daily 90 tablet 1    albuterol sulfate HFA (PROVENTIL;VENTOLIN;PROAIR) 108 (90 Base) MCG/ACT inhaler Inhale 2 puffs into the lungs every 6 hours as needed for Wheezing or Shortness of Breath 1 each 6    cetirizine (ZYRTEC) 10 MG tablet Take 1 tablet by mouth daily as needed for Allergies 30 tablet 4     No current facility-administered medications on file prior to visit. OBJECTIVE:    VS:   Vitals:    02/07/23 1418 02/07/23 1424   BP: (!) 149/83 138/81   Site: Left Upper Arm Left Upper Arm   Position: Sitting Sitting   Cuff Size: Large Adult Large Adult   Pulse: 72 76   Resp: 18 18   Temp: 97.2 °F (36.2 °C) 97.1 °F (36.2 °C)   TempSrc: Temporal Temporal   SpO2: 98%    Weight: 265 lb 9.6 oz (120.5 kg) 265 lb 9.6 oz (120.5 kg)   Height: 5' 5\" (1.651 m) 5' 5\" (1.651 m)     Physical Exam  Constitutional:       Appearance: She is obese. HENT:      Head: Normocephalic and atraumatic. Mouth/Throat:      Mouth: Mucous membranes are moist.      Pharynx: Oropharynx is clear. Eyes:      General: No scleral icterus. Conjunctiva/sclera: Conjunctivae normal.   Cardiovascular:      Rate and Rhythm: Normal rate and regular rhythm. Pulses: Normal pulses. Pulmonary:      Effort: No respiratory distress. Breath sounds: No wheezing. Comments: JVD on examination  Abdominal:      General: Bowel sounds are normal. There is no distension. Palpations: Abdomen is soft.       Tenderness: There is no abdominal tenderness. Skin:     General: Skin is warm. Coloration: Skin is not jaundiced. Findings: No bruising. Neurological:      General: No focal deficit present. Mental Status: She is alert. Mental status is at baseline. Psychiatric:         Mood and Affect: Mood normal.         Behavior: Behavior normal.          ASSESSMENT/PLAN:    TIA vs hypertensive urgency   -neurological exam wnl at this late   -seen in clinic today for hospital follow up   -on DAPT for 21 days  -referral to neurology placed      Advised patient to call Dr. Borja's office regarding possible reaction to new Bidil medication. Patient stated she had their number and was agreeable to this plan. RTC:  Return in about 2 weeks (around 2/21/2023) for pcp follow up. I have reviewed my findings and recommendations with Sherrill Lucero and Dr. Oliva Nesbitt.     Vivek Rosas,    2/7/2023 3:56 PM

## 2023-02-07 NOTE — PROGRESS NOTES
Congestive Heart Failure [address]    1965          Referring Provider: Kitty Hawk Gerson  Primary Care Physician: [facility]  Cardiologist: Chuckie Garvin  Nephrologist: N/A        History of Present Illness:     Sagar Trevizo is a 59 y.o. female with a history of HFrEF, most recent EF 40-45% 11/1/2022. Patient Story:    She does  have   slight dyspnea with exertion, shortness of breath, or decline in overall functional capacity. She does  not have orthopnea, PND, nocturnal cough or hemoptysis. She does NOT have abdominal distention or bloating, early satiety, anorexia/change in appetite. She has a good response to her oral diuretic. She does not have  lower extremity edema. She admits to occasional lightheadedness, dizziness. She denies palpitations, syncope or near syncope. She does not complain of chest pain, pressure, discomfort. Admits that she may not be drinking 64 oz daily. I encouraged her to drink more. She is receptive to that.      Allergies   Allergen Reactions    Bidil [Isosorb Dinitrate-Hydralazine] Other (See Comments)     Difficulty breathing    Doxycycline Swelling and Other (See Comments)    Iodine Hives     IV dye    Toradol [Ketorolac Tromethamine] Anaphylaxis    Iodides Hives           Current Outpatient Medications on File Prior to Encounter   Medication Sig Dispense Refill    aspirin 81 MG chewable tablet Take 1 tablet by mouth daily 30 tablet 3    rosuvastatin (CRESTOR) 40 MG tablet Take 1 tablet by mouth nightly 90 tablet 1    amLODIPine (NORVASC) 5 MG tablet Take 1 tablet by mouth daily 90 tablet 1    clopidogrel (PLAVIX) 75 MG tablet Take 1 tablet by mouth daily for 20 doses 20 tablet 0    carvedilol (COREG) 25 MG tablet Take 1 tablet by mouth 2 times daily (with meals) 180 tablet 1    furosemide (LASIX) 20 MG tablet Take 1 tablet by mouth daily 90 tablet 1    sacubitril-valsartan (ENTRESTO)  MG per tablet Take 1 tablet by mouth 2 times daily 180 tablet 1    spironolactone (ALDACTONE) 25 MG tablet Take 1 tablet by mouth daily 90 tablet 1    albuterol sulfate HFA (PROVENTIL;VENTOLIN;PROAIR) 108 (90 Base) MCG/ACT inhaler Inhale 2 puffs into the lungs every 6 hours as needed for Wheezing or Shortness of Breath 1 each 6    cetirizine (ZYRTEC) 10 MG tablet Take 1 tablet by mouth daily as needed for Allergies 30 tablet 4     No current facility-administered medications on file prior to encounter. Guideline directed medical:  ARNI/ACE I/ARB: Yes  Beta blocker:   Yes  Aldosterone antagonist:  Yes  SGLT2i:  No        Physical Examination:     BP (!) 148/72   Pulse 67   Resp 18   Wt 264 lb (119.7 kg)   SpO2 97%   BMI 43.93 kg/m²     Assessment  Charting Type: Shift assessment                   Respiratory  L Breath Sounds: Clear, Diminished  R Breath Sounds: Clear, Diminished              Cardiac  Cardiac Regularity: Regular  Heart Sounds: S1, S2  Cardiac Rhythm: Sinus rhythm    Rhythm Interpretation  Heart Rate: 67         Gastrointestinal  Abdominal (WDL): Within Defined Limits               Peripheral Vascular  Peripheral Vascular (WDL): Within Defined Limits  RLE Edema: None  LLE Edema: None                   Genitourinary  Genitourinary (WDL): Within Defined Limits    Psychosocial  Psychosocial (WDL): Within Defined Limits                        Heart Rate: 67                     LAB DATA:    Last 3 BMP      Sodium (mmol/L)   Date Value   02/03/2023 140   01/17/2023 141   01/03/2023 139     Potassium (mmol/L)   Date Value   01/17/2023 3.7   01/03/2023 4.0   12/20/2022 3.8     Potassium reflex Magnesium (mmol/L)   Date Value   02/03/2023 4.1   10/30/2022 3.8     Chloride (mmol/L)   Date Value   02/03/2023 107   01/17/2023 106   01/03/2023 108 (H)     CO2 (mmol/L)   Date Value   02/03/2023 25   01/17/2023 23   01/03/2023 21 (L)     BUN (mg/dL)   Date Value   02/03/2023 18   01/17/2023 16   01/03/2023 14     Glucose (mg/dL) Date Value   02/03/2023 95   02/03/2023 99   01/17/2023 99   03/09/2012 91   10/05/2010 89     Calcium (mg/dL)   Date Value   02/03/2023 9.3   01/17/2023 9.1   01/03/2023 9.2       Last 3 BNP       Pro-BNP (pg/mL)   Date Value   01/17/2023 941 (H)   01/03/2023 762 (H)   12/20/2022 750 (H)          CBC: No results for input(s): WBC, HGB, PLT in the last 72 hours. BMP:  No results for input(s): NA, K, CL, CO2, BUN, CREATININE, GLUCOSE in the last 72 hours. Hepatic: No results for input(s): AST, ALT, ALB, BILITOT, ALKPHOS in the last 72 hours. Troponin: No results for input(s): TROPONINI in the last 72 hours. BNP: No results for input(s): BNP in the last 72 hours. Lipids: No results for input(s): CHOL, HDL in the last 72 hours. Invalid input(s): LDLCALCU  INR: No results for input(s): INR in the last 72 hours. WEIGHTS:    Wt Readings from Last 3 Encounters:   02/07/23 264 lb (119.7 kg)   02/04/23 255 lb 11.7 oz (116 kg)   02/03/23 260 lb (117.9 kg)         TELEMETRY:  Cardiac Regularity: Regular  Cardiac Rhythm/Interpretation: NSR        ASSESSMENT:  Rosaura Loyd's weight is stable from last visit. Has no c/o. Assessment unremarkable. Interventions completed this visit:  IV diuretics given no  Lab work obtained yes, BMP,BNP   Reviewed currently prescribed medications with patient, educated on importance of compliance and answered any questions regarding their medication  Educated on signs and symptoms of HF  Educated on low sodium diet    PLAN:  Scheduled to follow up in CHF clinic on   Future Appointments   Date Time Provider Yesica Bryant   2/7/2023  2:00 PM URGENT CARE 1 [facility]   2/21/2023 10:45 AM Regino Pruett MD [facility]   3/7/2023  8:30 AM [facility] CHF ROOM 1 [facility]   5/1/2023 10:00 AM Ainsley Cornelius MD [id number] [facility]     Given clinic phone number and aware of signs and symptoms to call with any HF change in symptoms.

## 2023-02-07 NOTE — DISCHARGE SUMMARY
[address]  Discharge Summary    PCP: Velia Williamson MD    Admit Date:2/3/2023  Discharge Date: 2/6/2023    Admission Diagnosis:   TIA 2/2 hypotension following new medication  HFmEF (EF 45%)  Tension headache   HTN - chronic   Shortness of breath following taking bidil   HLD- chronic   Obesity   OA of knee    Discharge Diagnosis:  TIA vs hypertensive urgency causing prasthesias  HFmEF (EF 45%)  HTN - chronic   Shortness of breath following taking bidil   HLD- chronic   Obesity   OA of knee    Hospital Course:   On 2/3/2023, Lyla Felty presented to the ED with a chief complaint of shortness of breath, right sided paraesthesias, and aphasia starting 30-45 minutes after taking BiDil for the first time. She was prescribed this medication at a recent cardiology appointment on 1/31/2023. She took the remainder of her medications as normal. She did not take her BP at the time of the event at home. BP in the ED was 168/106. Shortness of breath was the first symptom she experienced and then the paraesthesias and difficulty speaking began. She describes knowing what she wanted to say but feeling like she could not get the words out. Her speaking difficulty was at the worst when she was in the ED. In the ED NIHSS was 2, and she had a CT head, CTA head and neck, CT perfusion, which were all normal without any LVO or perfusion mismatch. CXR with no acute abnormalities. MRI brain without contrast with no acute abnormality. At the time of discharge, he symptoms of parasthesias and speaking difficulties is almost completely resolved. The patient endorses difficulty speaking but it is no noticeable to the examiner. She was treated to cover TIA with ASA and 21 days of plavix. Repeat lipid panel with LDL of 77 (Crestor increased), A1c of 5.5. Lyla Felty continued to have hypertension during the hospital stay.  She was started on norvasc 5mg at the time of discharge and was instructed to follow-up with Dr. Sita Velarde to discuss her medications given her reaction to BiDil. Significant findings (history and exam, laboratory, radiological, pathology, other tests):   Constitutional:       Appearance: She is obese. HENT:      Head: Normocephalic and atraumatic. Mouth/Throat:      Mouth: Mucous membranes are moist.      Pharynx: Oropharynx is clear. Eyes:      Conjunctiva/sclera: Conjunctivae normal.   Cardiovascular:      Rate and Rhythm: Normal rate and regular rhythm. Pulses: Normal pulses. Heart sounds: Normal heart sounds. Pulmonary:      Effort: Pulmonary effort is normal.      Breath sounds: Normal breath sounds. Abdominal:      General: Abdomen is flat. Bowel sounds are normal. There is no distension. Palpations: Abdomen is soft. Tenderness: There is no abdominal tenderness. Musculoskeletal:      Right lower leg: Edema (trace) present. Left lower leg: Edema (trace) present. Skin:     General: Skin is warm and dry. Neurological:      Mental Status: She is alert and oriented to person, place, and time. Psychiatric:         Mood and Affect: Mood normal.         Behavior: Behavior normal.         Thought Content:  Thought content normal.         Judgment: Judgment normal.     Pending test results: none    Consults:  Neurology     Condition at discharge: Stable    Disposition: home    Discharge Medications:  Discharge Medication List as of 2/4/2023  1:00 PM        START taking these medications    Details   aspirin 81 MG chewable tablet Take 1 tablet by mouth daily, Disp-30 tablet, R-3Normal      amLODIPine (NORVASC) 5 MG tablet Take 1 tablet by mouth daily, Disp-90 tablet, R-1Normal      clopidogrel (PLAVIX) 75 MG tablet Take 1 tablet by mouth daily for 20 doses, Disp-20 tablet, R-0Normal           CONTINUE these medications which have CHANGED    Details   rosuvastatin (CRESTOR) 40 MG tablet Take 1 tablet by mouth nightly, Disp-90 tablet, R-1Normal           CONTINUE these medications which have NOT CHANGED    Details   carvedilol (COREG) 25 MG tablet Take 1 tablet by mouth 2 times daily (with meals), Disp-180 tablet, R-1Normal      furosemide (LASIX) 20 MG tablet Take 1 tablet by mouth daily, Disp-90 tablet, R-1Normal      sacubitril-valsartan (ENTRESTO)  MG per tablet Take 1 tablet by mouth 2 times daily, Disp-180 tablet, R-1Normal      spironolactone (ALDACTONE) 25 MG tablet Take 1 tablet by mouth daily, Disp-90 tablet, R-1Normal      albuterol sulfate HFA (PROVENTIL;VENTOLIN;PROAIR) 108 (90 Base) MCG/ACT inhaler Inhale 2 puffs into the lungs every 6 hours as needed for Wheezing or Shortness of Breath, Disp-1 each, R-6Normal      cetirizine (ZYRTEC) 10 MG tablet Take 1 tablet by mouth daily as needed for Allergies, Disp-30 tablet, R-4Normal           STOP taking these medications       isosorbide-hydrALAZINE (BIDIL) 20-37.5 MG per tablet Comments:   Reason for Stopping:         Misc. Devices MISC Comments:   Reason for Stopping:         Acetaminophen 650 MG TABS Comments:   Reason for Stopping:               Activity: activity as tolerated  Diet: regular diet    Follow-up appointments:   Call to make a follow-up appointment with Dr. [facility] [place] follow-up appointment with internal medicine will be scheduled for you     Patient Instructions:   Internal medicine    Follow ups  Please follow up with the internal medicine clinic at [facility] appointment will be scheduled for you some time in the next 7 days. The clinich will call you to inform you of your appointment.    Please keep all other follow up appointments:  Future Appointments   Date Time Provider Yesica Bryant   2/7/2023  8:30 AM [facility] ROOM 1 [facility]   2/21/2023 10:45 AM Tatum Santiago MD [facility]   5/1/2023 10:00 AM Sylvia Landry MD [facility]       Changes in healthcare   Please take all medications as indicated  Diet: regular diet   Activity: activity as tolerated  New Medications started during this hospital stay  Norvasc 5 mg daily   Aspirin 81 mg daily   Plavix (clopidogrel 75mg) daily for 3 weeks   Medications you should stop taking   BiDil (Isosorbide dinitrate / Hydralazine)  Please contact us if you have any concerns, wish to change or make an appointment:  Internal medicine clinic   Phone: [phone]  Fax: [phone] [address]  Should you have further questions in regards to this visit, you can review your clinical note and after visit summary document on your [employer] account. Other than any new prescriptions given to you today, the list of home medications on this After Visit Summary are based on information provided to us from you and your healthcare providers. This information, including the list, dose, and frequency of medications is only as accurate as the information you provided. If you have any questions or concerns about your home medications, please contact your Primary Care Physician for further clarification.      Rosana Keyes MD  PGY 1   9:54 PM 2/6/2023

## 2023-02-07 NOTE — PROGRESS NOTES
[facility]  [facility]    Attending Physician Statement  I have discussed the case, including pertinent history and exam findings with the resident physician. I have seen and examined the patient and the key elements of the encounter have been performed by me. I agree with the assessment, plan and orders as documented by the resident. I have reviewed all pertinent PMHx, PSHx, FamHx, SocialHx, medications, and allergies and updated history as appropriate. Patient presents for routine follow up of medical problems. IW receive bidal and developed side effect, seen  at ED and discharged. Amlodipine started and BP is 138/81, pending CHF clinic follow up. Need referral to neurology due to TIA like symptoms and currently on dual anti-PLT therapy  Remainder of medical problems as per resident note. Neurology consultation recommended.     Parul Disla MD  2/7/2023 3:43 PM

## 2023-02-07 NOTE — PLAN OF CARE
Problem: Chronic Conditions and Co-morbidities  Goal: Patient's chronic conditions and co-morbidity symptoms are monitored and maintained or improved  Flowsheets (Taken 2/7/2023 5663)  Care Plan - Patient's Chronic Conditions and Co-Morbidity Symptoms are Monitored and Maintained or Improved: Monitor and assess patient's chronic conditions and comorbid symptoms for stability, deterioration, or improvement

## 2023-02-08 ENCOUNTER — TELEPHONE (OUTPATIENT)
Dept: CARDIOLOGY CLINIC | Age: 58
End: 2023-02-08

## 2023-02-08 NOTE — TELEPHONE ENCOUNTER
Spoke with patient today. She states she was discharged on Norvasc and is feeling much better. She is scheduled to F/U on 5/1/23.

## 2023-02-14 ENCOUNTER — TELEPHONE (OUTPATIENT)
Dept: INTERNAL MEDICINE | Age: 58
End: 2023-02-14

## 2023-02-14 NOTE — TELEPHONE ENCOUNTER
This is second call reminding pt she needs to fill paperwork out before we can fax them , she was to pick them up last week but never came in states Had a  in family reminded to stop at  for papers to be completed so we can sent to her work as requested

## 2023-02-21 ENCOUNTER — OFFICE VISIT (OUTPATIENT)
Dept: INTERNAL MEDICINE | Age: 58
End: 2023-02-21
Payer: COMMERCIAL

## 2023-02-21 VITALS
HEART RATE: 80 BPM | TEMPERATURE: 98.3 F | SYSTOLIC BLOOD PRESSURE: 130 MMHG | RESPIRATION RATE: 18 BRPM | OXYGEN SATURATION: 98 % | DIASTOLIC BLOOD PRESSURE: 67 MMHG

## 2023-02-21 DIAGNOSIS — R05.1 ACUTE COUGH: ICD-10-CM

## 2023-02-21 DIAGNOSIS — R09.81 NASAL CONGESTION: ICD-10-CM

## 2023-02-21 DIAGNOSIS — M17.0 OSTEOARTHRITIS OF BOTH KNEES, UNSPECIFIED OSTEOARTHRITIS TYPE: Primary | ICD-10-CM

## 2023-02-21 DIAGNOSIS — J11.1 FLU SYNDROME: ICD-10-CM

## 2023-02-21 LAB
INFLUENZA A ANTIBODY: NOT DETECTED
INFLUENZA B ANTIBODY: NOT DETECTED
Lab: ABNORMAL
PERFORMING INSTRUMENT: ABNORMAL
QC PASS/FAIL: ABNORMAL
SARS-COV-2, POC: DETECTED

## 2023-02-21 PROCEDURE — 1036F TOBACCO NON-USER: CPT

## 2023-02-21 PROCEDURE — 87426 SARSCOV CORONAVIRUS AG IA: CPT

## 2023-02-21 PROCEDURE — 3017F COLORECTAL CA SCREEN DOC REV: CPT

## 2023-02-21 PROCEDURE — G8427 DOCREV CUR MEDS BY ELIG CLIN: HCPCS

## 2023-02-21 PROCEDURE — 99213 OFFICE O/P EST LOW 20 MIN: CPT

## 2023-02-21 PROCEDURE — 3074F SYST BP LT 130 MM HG: CPT

## 2023-02-21 PROCEDURE — 3078F DIAST BP <80 MM HG: CPT

## 2023-02-21 PROCEDURE — G8417 CALC BMI ABV UP PARAM F/U: HCPCS

## 2023-02-21 PROCEDURE — G8482 FLU IMMUNIZE ORDER/ADMIN: HCPCS

## 2023-02-21 PROCEDURE — 87804 INFLUENZA ASSAY W/OPTIC: CPT

## 2023-02-21 PROCEDURE — 99212 OFFICE O/P EST SF 10 MIN: CPT

## 2023-02-21 RX ORDER — FLUTICASONE PROPIONATE 50 MCG
1 SPRAY, SUSPENSION (ML) NASAL DAILY
Qty: 16 G | Refills: 2 | Status: SHIPPED | OUTPATIENT
Start: 2023-02-21

## 2023-02-21 RX ORDER — BENZONATATE 100 MG/1
100 CAPSULE ORAL 2 TIMES DAILY PRN
Qty: 20 CAPSULE | Refills: 0 | Status: SHIPPED | OUTPATIENT
Start: 2023-02-21 | End: 2023-02-28

## 2023-02-21 ASSESSMENT — ENCOUNTER SYMPTOMS
SHORTNESS OF BREATH: 0
VOMITING: 0
DIARRHEA: 0
ABDOMINAL PAIN: 0
VOICE CHANGE: 0
TROUBLE SWALLOWING: 0
BACK PAIN: 0
COUGH: 1
NAUSEA: 0
WHEEZING: 0
SORE THROAT: 0
BLOOD IN STOOL: 0
CHEST TIGHTNESS: 0
RHINORRHEA: 1

## 2023-02-21 NOTE — PATIENT INSTRUCTIONS
Thank you for coming to your follow up appointment   Please take your medications as directed and keep your follow up appointment in 3/28/2023.   Call our office if you have any questions or concerns at [phone]      Valarie Bacon MD

## 2023-02-21 NOTE — PROGRESS NOTES
[facility]    Attending Physician Statement  I have discussed the case, including pertinent history and exam findings with the resident physician. I agree with the assessment, plan and orders as documented by the resident. I have reviewed all pertinent PMHx, PSHx, FamHx, SocialHx, medications, and allergies and updated history as appropriate. Patient presents for routine follow up of medical problems. Pt presented with 4-5 days duration of URI symptoms with fatigue, sinus congestion and cough, denied fever or SOB. O2 sat in RA is 98% and fever neg, COVID test was positive. Pt reported improvement on her symptoms, recommended to remain isolated from the public for 5-6 additional days and conservative treatment only with tessalon ajit and antihistamine with adequate hydration. Paxlovid is not started as the pt is almost out of window of opportunity and symptoms have been improved. Recommended to call [facility] if pt has an aggravation of current symptoms. Remainder of medical problems as per resident note.     Magali Poole MD  2/21/2023 12:01 PM

## 2023-02-21 NOTE — PROGRESS NOTES
[facility] Internal Medicine      SUBJECTIVE:  Sarina Souza (:  1965) is a 59 y.o. female here for evaluation of the following chief complaint(s):  Follow-up and Concern For COVID-19    The patient came to the clinic with complaints of runny nose, cough and feeling tired since last 4 days. She initially started getting cough which was productive with whitish to yellowish sputum but not associated with chest pain or shortness of breath. Did complain of ear fullness had runny nose for which she has been taking antihistaminics. She also complained of myalgias since last 3 to 4 days which has been improved since yesterday. She denied a fever, shortness of breath, dizziness, headache, abdominal pain, diarrhea, nausea and vomiting. No associated leg swelling. POCT COVID antigen and invented test were done which was positive for COVID. She denied any other symptoms and has been regularly taking her medications. She is regularly following up with cardiology and heart failure clinic. She had complaints of bilateral knee pain which is chronic but has not changed since her last visit. Uses on and off Tylenol which helps relieve the pain mildly. She was concerned on getting back to work. She is supposed to be back to work early April so wanted to get assessed on last week of March to see if she can get back to work or not. Review of Systems   Constitutional:  Positive for chills and fatigue. Negative for appetite change, diaphoresis, fever and unexpected weight change. HENT:  Positive for rhinorrhea. Negative for ear discharge, ear pain, hearing loss, nosebleeds, postnasal drip, sneezing, sore throat, trouble swallowing and voice change. Respiratory:  Positive for cough. Negative for chest tightness, shortness of breath and wheezing. Cardiovascular:  Negative for chest pain, palpitations and leg swelling.    Gastrointestinal:  Negative for abdominal pain, blood in stool, diarrhea, nausea and vomiting. Genitourinary:  Negative for dysuria and flank pain. Musculoskeletal:  Negative for back pain, joint swelling, myalgias and neck stiffness. Skin:  Negative for pallor, rash and wound. Neurological:  Negative for tremors, facial asymmetry, weakness, numbness and headaches. Psychiatric/Behavioral:  Negative for behavioral problems and confusion. Current Outpatient Medications on File Prior to Visit   Medication Sig Dispense Refill    aspirin 81 MG chewable tablet Take 1 tablet by mouth daily 30 tablet 3    rosuvastatin (CRESTOR) 40 MG tablet Take 1 tablet by mouth nightly 90 tablet 1    amLODIPine (NORVASC) 5 MG tablet Take 1 tablet by mouth daily 90 tablet 1    clopidogrel (PLAVIX) 75 MG tablet Take 1 tablet by mouth daily for 20 doses 20 tablet 0    carvedilol (COREG) 25 MG tablet Take 1 tablet by mouth 2 times daily (with meals) 180 tablet 1    furosemide (LASIX) 20 MG tablet Take 1 tablet by mouth daily 90 tablet 1    sacubitril-valsartan (ENTRESTO)  MG per tablet Take 1 tablet by mouth 2 times daily 180 tablet 1    spironolactone (ALDACTONE) 25 MG tablet Take 1 tablet by mouth daily 90 tablet 1    albuterol sulfate HFA (PROVENTIL;VENTOLIN;PROAIR) 108 (90 Base) MCG/ACT inhaler Inhale 2 puffs into the lungs every 6 hours as needed for Wheezing or Shortness of Breath 1 each 6    cetirizine (ZYRTEC) 10 MG tablet Take 1 tablet by mouth daily as needed for Allergies 30 tablet 4     No current facility-administered medications on file prior to visit.        OBJECTIVE:    VS:   Vitals:    02/21/23 1058   BP: 130/67   Site: Left Upper Arm   Position: Sitting   Cuff Size: Large Adult   Pulse: 80   Resp: 18   Temp: 98.3 °F (36.8 °C)   TempSrc: Temporal   SpO2: 98%     Physical Exam:  Vitals: /67 (Site: Left Upper Arm, Position: Sitting, Cuff Size: Large Adult)   Pulse 80   Temp 98.3 °F (36.8 °C) (Temporal)   Resp 18   SpO2 98% Comment: jairo I & O - 24hr: [unfilled]   General Appearance: alert, appears stated age, and cooperative  HEENT:  Head: Normal, normocephalic, atraumatic. Neck: no adenopathy, no carotid bruit, no JVD, and supple, symmetrical, trachea midline  Lung: clear to auscultation bilaterally  Heart: regular rate and rhythm, S1, S2 normal, no murmur, click, rub or gallop  Abdomen: soft, non-tender; bowel sounds normal; no masses,  no organomegaly  Extremities:  extremities normal, atraumatic, no cyanosis or edema  Musculokeletal: No joint swelling, no muscle tenderness. ROM normal in all joints of extremities. Neurologic: Mental status: Alert, oriented, thought content appropriate       ASSESSMENT/PLAN:  1. Osteoarthritis of both knees, unspecified osteoarthritis type  2. Nasal congestion  -     fluticasone (FLONASE) 50 MCG/ACT nasal spray; 1 spray by Each Nostril route daily, Disp-16 g, R-2Normal  3. Acute cough  -     benzonatate (TESSALON) 100 MG capsule; Take 1 capsule by mouth 2 times daily as needed for Cough, Disp-20 capsule, R-0Normal  4. Flu syndrome  -     POCT COVID-19, Antigen  -     POCT Influenza A/B       1.  HFmEF, with mild CAD  - ECHO on 11/1/22 showed EF 40 -45%, mild dilated left ventricle, anterior anterolateral wall hypokinetic, stage II left ventricular diastolic dysfunction  -Left heart cath on 12/7/2020 showed mild CAD, elevated LVEDP at 25 and uncontrolled blood pressure  -Regularly following with cardiology  -Continue on carvedilol 25 mg twice daily, Entresto  mg twice daily, spironolactone 25 mg daily  -Continue on aspirin 81 mg daily and Plavix 75 mg daily    2.  COVID-19 infection  -Symptomatic since past 4 days  -POCT COVID-19 antigen positive  -Continue symptomatic treatment  -Tessalon for cough and Flonase for nasal congestion     3.   Essential hypertension  -Blood pressure on presentation 130/67   -Recently adjusted blood pressure medications was taken off isosorbide dinitrate-hydralazine as patient is allergic  -Continue on medication and follow recommendation from cardio  -Monitor blood pressure regularly at home     History of asthma  -Was discharged on Combivent and rescue albuterol  -Not taking Combivent due to cost issues  -No episodes of worsening shortness of breath and baseline shortness of breath while walking uphill  -Discontinue on Combivent and use as needed albuterol      History of diverticulitis  -CT abdomen showed left hemicolon diverticulosis without diverticulitis 11/9/22     History hyperlipidemia  -Continue on rosuvastatin 40 mg nightly     Obesity  -BMI 42.48,   -Patient counseled for dietary and lifestyle modification    8. Bilateral osteoarthritis of knee  -Uses is on and off Tylenol    9. HCM  -Planned to talk about healthcare maintenance on next session    RTC:  Return in about 5 weeks (around 3/28/2023) for PCP follow up. I have reviewed my findings and recommendations with Eduar Nielsen and Dr. Alexander Garcia.     Janis Yanes MD   2/21/2023 12:53 PM

## 2023-03-03 ENCOUNTER — OFFICE VISIT (OUTPATIENT)
Dept: NEUROLOGY | Age: 58
End: 2023-03-03
Payer: COMMERCIAL

## 2023-03-03 VITALS
SYSTOLIC BLOOD PRESSURE: 100 MMHG | BODY MASS INDEX: 44.15 KG/M2 | HEIGHT: 65 IN | DIASTOLIC BLOOD PRESSURE: 60 MMHG | WEIGHT: 265 LBS

## 2023-03-03 DIAGNOSIS — G45.9 TIA (TRANSIENT ISCHEMIC ATTACK): ICD-10-CM

## 2023-03-03 DIAGNOSIS — M54.17 L-S RADICULOPATHY: ICD-10-CM

## 2023-03-03 DIAGNOSIS — I16.1 HYPERTENSIVE EMERGENCY: Primary | ICD-10-CM

## 2023-03-03 PROCEDURE — G8417 CALC BMI ABV UP PARAM F/U: HCPCS | Performed by: CLINICAL NURSE SPECIALIST

## 2023-03-03 PROCEDURE — 3017F COLORECTAL CA SCREEN DOC REV: CPT | Performed by: CLINICAL NURSE SPECIALIST

## 2023-03-03 PROCEDURE — 1036F TOBACCO NON-USER: CPT | Performed by: CLINICAL NURSE SPECIALIST

## 2023-03-03 PROCEDURE — 99215 OFFICE O/P EST HI 40 MIN: CPT | Performed by: CLINICAL NURSE SPECIALIST

## 2023-03-03 PROCEDURE — G8482 FLU IMMUNIZE ORDER/ADMIN: HCPCS | Performed by: CLINICAL NURSE SPECIALIST

## 2023-03-03 PROCEDURE — 3074F SYST BP LT 130 MM HG: CPT | Performed by: CLINICAL NURSE SPECIALIST

## 2023-03-03 PROCEDURE — 3078F DIAST BP <80 MM HG: CPT | Performed by: CLINICAL NURSE SPECIALIST

## 2023-03-03 PROCEDURE — G8427 DOCREV CUR MEDS BY ELIG CLIN: HCPCS | Performed by: CLINICAL NURSE SPECIALIST

## 2023-03-03 NOTE — PROGRESS NOTES
Arnel Riggs is a 59 y.o. right handed female    Patient presented to ED on 2/3/23 with shortness of breath, difficulty speaking, right-sided paresthesias and lightheadedness. Stroke alert was called with a NIH of 2. Patient's initial BP was 168/106. CT head was negative for acute pathology. CTA head and neck was unrevealing for LVO or high-grade stenosis. Perfusion showed no mismatch. MRI showed no acute abnormality; minimal cerebellar tonsillar ectopia. Patient was not considered for tPA due to low NIH and negative neuroimaging. Patient was started on aspirin and Plavix with admission for neuro evaluation. Since admission MRI brain completed and was negative for acute pathology. Was believed that she suffered a hypertensive emergency and with improvements in her blood pressure she has noticed improvements in her symptoms.   However, she now complains of being lightheaded and states her blood pressure runs low-I will forward my notes over cardiology for she has an appointment there on Tuesday    Unfortunately she has numerous low back and orthopedic issues which she has been doctoring herself for the past 3-4 decades  She is currently using a single prong cane in order to stand and ambulate however states that she would like to return to work but is unable to use a cane while at work    She is here alone and an excellent historian  Objective:     /60 (Site: Left Upper Arm, Position: Sitting, Cuff Size: Large Adult)   Ht 5' 5\" (1.651 m)   Wt 265 lb (120.2 kg)   BMI 44.10 kg/m²     General appearance: alert, appears stated age, cooperative and no distress  Head: normocephalic, without obvious abnormality, atraumatic  Extremities: normal, atraumatic, no cyanosis or edema  Pulses: 2+ and symmetric  Skin: color, texture, turgor normal---no rashes or lesions    Mental Status: alert, oriented x4, thought content appropriate     Speech: no dysarthria  Language: no aphasias     Cranial Nerves:  I: smell    II: visual acuity     II: visual fields Full to confrontation bilaterally   II: pupils PERRL   III,VII: ptosis None   III,IV,VI: extraocular muscles  EOMI without nystagmus    V: mastication Normal   V: facial light touch sensation  Normal   V,VII: corneal reflex     VII: facial muscle function - upper  Normal   VII: facial muscle function - lower Normal   VIII: hearing Normal   IX: soft palate elevation  Normal   IX,X: gag reflex    XI: trapezius strength  5/5   XI: sternocleidomastoid strength 5/5   XI: neck extension strength  5/5   XII: tongue strength  Normal     Motor:  5/5 throughout  Normal bulk and tone     Sensory:  LT appreciated in all limbs  Vibration moderately decreased in ankles    Coordination:   FN, FFM and JAQUELIN normal    Gait:  [place]  Wide-based  Bilateral foot drop    DTR:   No reflexes appreciated    No Cahterine's     Laboratory/Radiology:     CBC with Differential:    Lab Results   Component Value Date/Time    WBC 4.9 02/03/2023 10:14 AM    RBC 3.64 02/03/2023 10:14 AM    HGB 10.7 02/03/2023 10:14 AM    HCT 31.8 02/03/2023 10:14 AM     02/03/2023 10:14 AM    MCV 87.4 02/03/2023 10:14 AM    MCH 29.4 02/03/2023 10:14 AM    MCHC 33.6 02/03/2023 10:14 AM    RDW 12.3 02/03/2023 10:14 AM    SEGSPCT 78 12/06/2013 01:16 PM    LYMPHOPCT 45.5 02/03/2023 10:14 AM    MONOPCT 8.8 02/03/2023 10:14 AM    BASOPCT 0.6 02/03/2023 10:14 AM    MONOSABS 0.43 02/03/2023 10:14 AM    LYMPHSABS 2.23 02/03/2023 10:14 AM    EOSABS 0.30 02/03/2023 10:14 AM    BASOSABS 0.03 02/03/2023 10:14 AM     CMP:    Lab Results   Component Value Date/Time     02/07/2023 08:30 AM    K 3.8 02/07/2023 08:30 AM    K 4.1 02/03/2023 10:14 AM     02/07/2023 08:30 AM    CO2 26 02/07/2023 08:30 AM    BUN 16 02/07/2023 08:30 AM    CREATININE 0.9 02/07/2023 08:30 AM    GFRAA >60 03/13/2015 06:10 AM    LABGLOM >60 02/07/2023 08:30 AM    GLUCOSE 98 02/07/2023 08:30 AM    GLUCOSE 91 03/09/2012 10:43 AM  PROT 7.0 11/08/2022 10:44 PM    LABALBU 3.7 11/08/2022 10:44 PM    LABALBU 4.3 03/09/2012 10:43 AM    CALCIUM 9.0 02/07/2023 08:30 AM    BILITOT 0.4 11/08/2022 10:44 PM    ALKPHOS 89 11/08/2022 10:44 PM    AST 17 11/08/2022 10:44 PM    ALT 15 11/08/2022 10:44 PM     HgBA1c:    Lab Results   Component Value Date/Time    LABA1C 5.5 02/03/2023 10:14 AM     FLP:    Lab Results   Component Value Date/Time    TRIG 71 02/03/2023 10:14 AM    HDL 66 02/03/2023 10:14 AM    LDLCALC 77 02/03/2023 10:14 AM    LABVLDL 14 02/03/2023 10:14 AM     Complete echo 11/1/2022:   Mildly dilated left ventricle. Ejection fraction is visually estimated at 40-45%. Overall ejection fraction mild-to-moderately decreased . Anterior and anterolateral walls are hypokinetic. Moderate concentric left ventricular hypertrophy. Stage II diastolic dysfunction. Technically difficult examination. Definity echo contrast was used to   delineate endocardial borders.     I independently reviewed all pertinent labs and imaging studies today    Assessment:     Presented with complaint of difficulty speaking, lightheadedness and right-sided paresthesias   NIH in ED was 2 for sensory and aphasia   CT head, CTA head and neck and perfusion studies were unrevealing   MRI brain completed this morning showed no acute pathology   Patient with presumed hypertensive emergency-symptoms improved with medication adjustments    Unfortunately now with lightheadedness secondary to hypotension we will forward notes over to cardiology    Chronic low back pains and orthopedic difficulties-we will forward to physical medicine for evaluation in hopes of returning to work without utilization of assistive device which is against her works policy    Plan:     Continue aspirin, Plavix and high intensity statin     Refer to PM and R    Referred to RETAIN    We will follow with Madeline at her next appointment          DENI Koch - CNS   10:34 AM  3/3/2023

## 2023-03-07 ENCOUNTER — HOSPITAL ENCOUNTER (OUTPATIENT)
Dept: OTHER | Age: 58
Setting detail: THERAPIES SERIES
Discharge: HOME | End: 2023-03-07
Payer: COMMERCIAL

## 2023-03-07 VITALS
BODY MASS INDEX: 43.93 KG/M2 | OXYGEN SATURATION: 99 % | RESPIRATION RATE: 18 BRPM | WEIGHT: 264 LBS | HEART RATE: 58 BPM | DIASTOLIC BLOOD PRESSURE: 80 MMHG | SYSTOLIC BLOOD PRESSURE: 160 MMHG

## 2023-03-07 LAB
ANION GAP SERPL CALCULATED.3IONS-SCNC: 10 MMOL/L (ref 7–16)
BUN BLDV-MCNC: 23 MG/DL (ref 6–20)
CALCIUM SERPL-MCNC: 9.1 MG/DL (ref 8.6–10.2)
CHLORIDE BLD-SCNC: 107 MMOL/L (ref 98–107)
CO2: 23 MMOL/L (ref 22–29)
CREAT SERPL-MCNC: 1 MG/DL (ref 0.5–1)
GFR SERPL CREATININE-BSD FRML MDRD: >60 ML/MIN/1.73
GLUCOSE BLD-MCNC: 98 MG/DL (ref 74–99)
POTASSIUM SERPL-SCNC: 4.4 MMOL/L (ref 3.5–5)
PRO-BNP: 104 PG/ML (ref 0–125)
SODIUM BLD-SCNC: 140 MMOL/L (ref 132–146)

## 2023-03-07 PROCEDURE — 36415 COLL VENOUS BLD VENIPUNCTURE: CPT

## 2023-03-07 PROCEDURE — 99214 OFFICE O/P EST MOD 30 MIN: CPT

## 2023-03-07 PROCEDURE — 80048 BASIC METABOLIC PNL TOTAL CA: CPT

## 2023-03-07 PROCEDURE — 83880 ASSAY OF NATRIURETIC PEPTIDE: CPT

## 2023-03-07 NOTE — PLAN OF CARE
Problem: Chronic Conditions and Co-morbidities  Goal: Patient's chronic conditions and co-morbidity symptoms are monitored and maintained or improved  Flowsheets (Taken 3/7/2023 4558)  Care Plan - Patient's Chronic Conditions and Co-Morbidity Symptoms are Monitored and Maintained or Improved: Monitor and assess patient's chronic conditions and comorbid symptoms for stability, deterioration, or improvement

## 2023-03-07 NOTE — PROGRESS NOTES
Congestive Heart Failure [address] [facility]   1965          Referring Provider: Haydee Delgado  Primary Care Physician: [facility]  Cardiologist: Stephania Lawson  Nephrologist: N/A        History of Present Illness:     Carolyn Whitley is a 59 y.o. female with a history of HFrEF, most recent EF 40-45% 11/1/2022. Patient Story:    She does  have   slight dyspnea with exertion, shortness of breath, or decline in overall functional capacity. She does  not have orthopnea, PND, nocturnal cough or hemoptysis. She does NOT have abdominal distention or bloating, early satiety, anorexia/change in appetite. She has a good response to her oral diuretic. She does not have  lower extremity edema. She admits to occasional lightheadedness, dizziness. She denies palpitations, syncope or near syncope. She does not complain of chest pain, pressure, discomfort. Admits that she may not be drinking 64 oz daily. I encouraged her to drink more. She is receptive to that.      Allergies   Allergen Reactions    Bidil [Isosorb Dinitrate-Hydralazine] Other (See Comments)     Difficulty breathing    Doxycycline Swelling and Other (See Comments)    Iodine Hives     IV dye    Toradol [Ketorolac Tromethamine] Anaphylaxis    Hydralazine     Iodides Hives           Current Outpatient Medications on File Prior to Encounter   Medication Sig Dispense Refill    fluticasone (FLONASE) 50 MCG/ACT nasal spray 1 spray by Each Nostril route daily 16 g 2    aspirin 81 MG chewable tablet Take 1 tablet by mouth daily 30 tablet 3    rosuvastatin (CRESTOR) 40 MG tablet Take 1 tablet by mouth nightly 90 tablet 1    amLODIPine (NORVASC) 5 MG tablet Take 1 tablet by mouth daily 90 tablet 1    clopidogrel (PLAVIX) 75 MG tablet Take 1 tablet by mouth daily for 20 doses (Patient not taking: Reported on 3/7/2023) 20 tablet 0    carvedilol (COREG) 25 MG tablet Take 1 tablet by mouth 2 times daily (with meals) 180 tablet 1 furosemide (LASIX) 20 MG tablet Take 1 tablet by mouth daily 90 tablet 1    sacubitril-valsartan (ENTRESTO)  MG per tablet Take 1 tablet by mouth 2 times daily 180 tablet 1    spironolactone (ALDACTONE) 25 MG tablet Take 1 tablet by mouth daily 90 tablet 1    albuterol sulfate HFA (PROVENTIL;VENTOLIN;PROAIR) 108 (90 Base) MCG/ACT inhaler Inhale 2 puffs into the lungs every 6 hours as needed for Wheezing or Shortness of Breath 1 each 6    cetirizine (ZYRTEC) 10 MG tablet Take 1 tablet by mouth daily as needed for Allergies 30 tablet 4     No current facility-administered medications on file prior to encounter. Guideline directed medical:  ARNI/ACE I/ARB: Yes  Beta blocker:   Yes  Aldosterone antagonist:  Yes  SGLT2i:  No        Physical Examination:     BP (!) 160/80 Comment: DID NOT TAKE AM MEDS BECAUSE SHE HAS NOT EATEN  Pulse 58   Resp 18   Wt 264 lb (119.7 kg)   SpO2 99%   BMI 43.93 kg/m²     Assessment  Charting Type: Shift assessment                   Respiratory  L Breath Sounds: Clear, Diminished  R Breath Sounds: Clear, Diminished              Cardiac  Cardiac Regularity: Regular  Heart Sounds: S1, S2  Cardiac Rhythm: Sinus rhythm    Rhythm Interpretation  Heart Rate: 58         Gastrointestinal  Abdominal (WDL): Within Defined Limits               Peripheral Vascular  Peripheral Vascular (WDL): Within Defined Limits  RLE Edema: None  LLE Edema: None                   Genitourinary  Genitourinary (WDL): Within Defined Limits    Psychosocial  Psychosocial (WDL): Within Defined Limits                        Heart Rate: 58                     LAB DATA:    Last 3 BMP      Sodium (mmol/L)   Date Value   02/07/2023 140   02/03/2023 140   01/17/2023 141     Potassium (mmol/L)   Date Value   02/07/2023 3.8   01/17/2023 3.7   01/03/2023 4.0     Potassium reflex Magnesium (mmol/L)   Date Value   02/03/2023 4.1   10/30/2022 3.8     Chloride (mmol/L)   Date Value   02/07/2023 108 (H)   02/03/2023 107   01/17/2023 106     CO2 (mmol/L)   Date Value   02/07/2023 26   02/03/2023 25   01/17/2023 23     BUN (mg/dL)   Date Value   02/07/2023 16   02/03/2023 18   01/17/2023 16     Glucose (mg/dL)   Date Value   02/07/2023 98   02/03/2023 95   02/03/2023 99   03/09/2012 91   10/05/2010 89     Calcium (mg/dL)   Date Value   02/07/2023 9.0   02/03/2023 9.3   01/17/2023 9.1       Last 3 BNP       Pro-BNP (pg/mL)   Date Value   02/07/2023 264 (H)   01/17/2023 941 (H)   01/03/2023 762 (H)          CBC: No results for input(s): WBC, HGB, PLT in the last 72 hours. BMP:  No results for input(s): NA, K, CL, CO2, BUN, CREATININE, GLUCOSE in the last 72 hours. Hepatic: No results for input(s): AST, ALT, ALB, BILITOT, ALKPHOS in the last 72 hours. Troponin: No results for input(s): TROPONINI in the last 72 hours. BNP: No results for input(s): BNP in the last 72 hours. Lipids: No results for input(s): CHOL, HDL in the last 72 hours. Invalid input(s): LDLCALCU  INR: No results for input(s): INR in the last 72 hours. WEIGHTS:    Wt Readings from Last 3 Encounters:   03/07/23 264 lb (119.7 kg)   03/03/23 265 lb (120.2 kg)   02/07/23 264 lb (119.7 kg)         TELEMETRY:  Cardiac Regularity: Regular  Cardiac Rhythm/Interpretation: NSR        ASSESSMENT:  Sumit Loyd's weight is stable from last visit. Has no c/o. Assessment unremarkable. PT ADMITS [address] PT CONFIRMED WILL CALL FOR EARLIER APPT. IF NEEDED.       Interventions completed this visit:  IV diuretics given no  Lab work obtained yes, BMP,BNP   Reviewed currently prescribed medications with patient, educated on importance of compliance and answered any questions regarding their medication  Educated on signs and symptoms of HF  Educated on low sodium diet    PLAN:  Scheduled to follow up in CHF clinic on   Future Appointments   Date Time Provider Yesica Bryant   3/29/2023  8:30 AM Goble Fothergill, MD [facility]   4/4/2023  9:00 AM [facility] CHF ROOM 1 [address] [facility] [address]   5/1/2023 10:00 AM Ángel Kang MD [address] [facility]   5/18/2023  9:40 AM DENI Gay - [facility] -     Given clinic phone number and aware of signs and symptoms to call with any HF change in symptoms.

## 2023-03-29 ENCOUNTER — OFFICE VISIT (OUTPATIENT)
Dept: INTERNAL MEDICINE | Age: 58
End: 2023-03-29
Payer: COMMERCIAL

## 2023-03-29 VITALS
SYSTOLIC BLOOD PRESSURE: 113 MMHG | HEIGHT: 65 IN | DIASTOLIC BLOOD PRESSURE: 68 MMHG | TEMPERATURE: 97.2 F | OXYGEN SATURATION: 95 % | WEIGHT: 271.5 LBS | BODY MASS INDEX: 45.23 KG/M2 | RESPIRATION RATE: 18 BRPM | HEART RATE: 74 BPM

## 2023-03-29 DIAGNOSIS — E78.5 HYPERLIPIDEMIA, UNSPECIFIED HYPERLIPIDEMIA TYPE: ICD-10-CM

## 2023-03-29 DIAGNOSIS — I10 PRIMARY HYPERTENSION: ICD-10-CM

## 2023-03-29 DIAGNOSIS — Z12.31 BREAST CANCER SCREENING BY MAMMOGRAM: Primary | ICD-10-CM

## 2023-03-29 DIAGNOSIS — I50.43 CHF (CONGESTIVE HEART FAILURE), NYHA CLASS I, ACUTE ON CHRONIC, COMBINED (HCC): ICD-10-CM

## 2023-03-29 DIAGNOSIS — R09.81 NASAL CONGESTION: ICD-10-CM

## 2023-03-29 PROCEDURE — 99212 OFFICE O/P EST SF 10 MIN: CPT

## 2023-03-29 RX ORDER — ROSUVASTATIN CALCIUM 40 MG/1
40 TABLET, COATED ORAL NIGHTLY
Qty: 90 TABLET | Refills: 1 | Status: SHIPPED | OUTPATIENT
Start: 2023-03-29

## 2023-03-29 RX ORDER — AMLODIPINE BESYLATE 5 MG/1
5 TABLET ORAL DAILY
Qty: 90 TABLET | Refills: 1 | Status: SHIPPED | OUTPATIENT
Start: 2023-03-29

## 2023-03-29 RX ORDER — ZOSTER VACCINE RECOMBINANT, ADJUVANTED 50 MCG/0.5
0.5 KIT INTRAMUSCULAR SEE ADMIN INSTRUCTIONS
Qty: 0.5 ML | Refills: 0 | Status: CANCELLED | OUTPATIENT
Start: 2023-03-29 | End: 2023-09-25

## 2023-03-29 RX ORDER — CARVEDILOL 25 MG/1
25 TABLET ORAL 2 TIMES DAILY WITH MEALS
Qty: 180 TABLET | Refills: 1 | Status: SHIPPED | OUTPATIENT
Start: 2023-03-29

## 2023-03-29 RX ORDER — FUROSEMIDE 20 MG/1
20 TABLET ORAL DAILY
Qty: 90 TABLET | Refills: 1 | Status: SHIPPED | OUTPATIENT
Start: 2023-03-29

## 2023-03-29 RX ORDER — SPIRONOLACTONE 25 MG/1
25 TABLET ORAL DAILY
Qty: 90 TABLET | Refills: 1 | Status: SHIPPED | OUTPATIENT
Start: 2023-03-29

## 2023-03-29 RX ORDER — FLUTICASONE PROPIONATE 50 MCG
1 SPRAY, SUSPENSION (ML) NASAL DAILY
Qty: 16 G | Refills: 1 | Status: SHIPPED | OUTPATIENT
Start: 2023-03-29

## 2023-03-29 ASSESSMENT — ENCOUNTER SYMPTOMS
SORE THROAT: 0
ABDOMINAL PAIN: 0
RHINORRHEA: 0
COUGH: 0
CHEST TIGHTNESS: 0
VOMITING: 0
BACK PAIN: 1
COLOR CHANGE: 0
SHORTNESS OF BREATH: 0
NAUSEA: 0

## 2023-03-29 NOTE — PATIENT INSTRUCTIONS
Thank you for coming to your follow up appointment   Please take your medications as directed and keep your follow up appointment in 7/12/2023. Call our office if you have any questions or concerns at [phone]  Please follow up with cardiology and physical therapy as scheduled.     Miles Felix MD

## 2023-03-29 NOTE — LETTER
March 29, 2023       Priyanka Ramirez YOB: 1965   [address] [facility] [address] Date of Visit:  3/29/2023       To Whom It May Concern: It is my medical opinion that Coty Lomas may return to work on 4/16/2023 with the following restrictions: avoid excessive walking or standing for more than 2 hours and aviod lifting weight. If you have any questions or concerns, please don't hesitate to call.     Sincerely,        Cielo Hope MD

## 2023-03-29 NOTE — PROGRESS NOTES
[facility]  Internal Medicine Residency Clinic    Attending Physician Statement  I have discussed the case, including pertinent history and exam findings with the resident physician. I agree with the assessment, plan and orders as documented by the resident. I have reviewed all pertinent PMHx, PSHx, FamHx, SocialHx, medications, and allergies and updated history as appropriate. Patient here for routine follow up of medical problems. HTN  -controlled; The current medical regimen is effective;  continue present plan and medications. HFmrEF  -patient currently euvolemic and NHYA I  -on GDMT  -cotninue asa and plavix per cardiology recs 2/2 CAD    HLD  -controlled; The current medical regimen is effective;  continue present plan and medications. The 10-year ASCVD risk score (Travon CABALLERO, et al., 2019) is: 3.1%    Values used to calculate the score:      Age: 59 years      Sex: Female      Is Non- : Yes      Diabetic: No      Tobacco smoker: No      Systolic Blood Pressure: 776 mmHg      Is BP treated: Yes      HDL Cholesterol: 66 mg/dL      Total Cholesterol: 157 mg/dL      Remainder of medical problems as per resident note.     [address] DO  3/29/2023 8:59 AM    Encounter time including independent chart review, discussion with patient, interpreting test results and/or external communications: 30'
class I, acute on chronic, combined (HCC)  -     carvedilol (COREG) 25 MG tablet; Take 1 tablet by mouth 2 times daily (with meals), Disp-180 tablet, R-1Normal  -     furosemide (LASIX) 20 MG tablet; Take 1 tablet by mouth daily, Disp-90 tablet, R-1Normal  -     sacubitril-valsartan (ENTRESTO)  MG per tablet; Take 1 tablet by mouth 2 times daily, Disp-180 tablet, R-1Normal  -     spironolactone (ALDACTONE) 25 MG tablet; Take 1 tablet by mouth daily, Disp-90 tablet, R-1Normal  4. Primary hypertension  -     amLODIPine (NORVASC) 5 MG tablet; Take 1 tablet by mouth daily, Disp-90 tablet, R-1Normal  5. Hyperlipidemia, unspecified hyperlipidemia type  -     rosuvastatin (CRESTOR) 40 MG tablet; Take 1 tablet by mouth nightly, Disp-90 tablet, R-1Normal     HFmEF, with mild CAD  - ECHO on 11/1/22 showed EF 40 -45%, mild dilated left ventricle, anterior anterolateral wall hypokinetic, stage II left ventricular diastolic dysfunction  -Left heart cath on 12/7/2020 showed mild CAD, elevated LVEDP at 25 and uncontrolled blood pressure  -Regularly following with cardiology  -Continue on carvedilol 25 mg twice daily, Entresto  mg twice daily, spironolactone 25 mg daily  -Continue on aspirin 81 mg daily    2. TIA  - recently evaluated by neurology on 2/4/23 for rt sided paraesthesias and aphasia  - Started on DAPT for 21 days  - Continue on ASA 81 mg daily      3.   Essential hypertension  -Blood pressure today was 113/68  -Recently adjusted blood pressure medications was taken off isosorbide dinitrate-hydralazine as patient is allergic  -Continue on medication and follow recommendation from cardio  -Monitor blood pressure regularly at home     History of asthma  -Not taking Combivent due to cost issues  -No episodes of worsening shortness of breath and baseline shortness of breath while walking uphill  - Continue on Albuterol as needed     History of diverticulitis  -CT abdomen showed left hemicolon diverticulosis

## 2023-04-04 ENCOUNTER — HOSPITAL ENCOUNTER (OUTPATIENT)
Dept: OTHER | Age: 58
Setting detail: THERAPIES SERIES
Discharge: HOME | End: 2023-04-04
Payer: COMMERCIAL

## 2023-04-04 VITALS
RESPIRATION RATE: 18 BRPM | SYSTOLIC BLOOD PRESSURE: 122 MMHG | HEART RATE: 74 BPM | DIASTOLIC BLOOD PRESSURE: 57 MMHG | OXYGEN SATURATION: 98 % | WEIGHT: 274 LBS | BODY MASS INDEX: 45.6 KG/M2

## 2023-04-04 LAB
ANION GAP SERPL CALCULATED.3IONS-SCNC: 11 MMOL/L (ref 7–16)
BNP BLD-MCNC: 130 PG/ML (ref 0–125)
BUN SERPL-MCNC: 22 MG/DL (ref 6–20)
CALCIUM SERPL-MCNC: 9 MG/DL (ref 8.6–10.2)
CHLORIDE SERPL-SCNC: 107 MMOL/L (ref 98–107)
CO2 SERPL-SCNC: 22 MMOL/L (ref 22–29)
CREAT SERPL-MCNC: 1 MG/DL (ref 0.5–1)
GLUCOSE SERPL-MCNC: 125 MG/DL (ref 74–99)
POTASSIUM SERPL-SCNC: 3.5 MMOL/L (ref 3.5–5)
SODIUM SERPL-SCNC: 140 MMOL/L (ref 132–146)

## 2023-04-04 PROCEDURE — 99214 OFFICE O/P EST MOD 30 MIN: CPT

## 2023-04-04 PROCEDURE — 36415 COLL VENOUS BLD VENIPUNCTURE: CPT

## 2023-04-04 PROCEDURE — 83880 ASSAY OF NATRIURETIC PEPTIDE: CPT

## 2023-04-04 PROCEDURE — 80048 BASIC METABOLIC PNL TOTAL CA: CPT

## 2023-04-04 NOTE — PROGRESS NOTES
03/07/2023 23   02/07/2023 26   02/03/2023 25     BUN (mg/dL)   Date Value   03/07/2023 23 (H)   02/07/2023 16   02/03/2023 18     Glucose (mg/dL)   Date Value   03/07/2023 98   02/07/2023 98   02/03/2023 95   03/09/2012 91   10/05/2010 89     Calcium (mg/dL)   Date Value   03/07/2023 9.1   02/07/2023 9.0   02/03/2023 9.3       Last 3 BNP       Pro-BNP (pg/mL)   Date Value   03/07/2023 104   02/07/2023 264 (H)   01/17/2023 941 (H)          CBC: No results for input(s): WBC, HGB, PLT in the last 72 hours. BMP:  No results for input(s): NA, K, CL, CO2, BUN, CREATININE, GLUCOSE in the last 72 hours. Hepatic: No results for input(s): AST, ALT, ALB, BILITOT, ALKPHOS in the last 72 hours. Troponin: No results for input(s): TROPONINI in the last 72 hours. BNP: No results for input(s): BNP in the last 72 hours. Lipids: No results for input(s): CHOL, HDL in the last 72 hours. Invalid input(s): LDLCALCU  INR: No results for input(s): INR in the last 72 hours. WEIGHTS:    Wt Readings from Last 3 Encounters:   04/04/23 274 lb (124.3 kg)   03/29/23 271 lb 8 oz (123.2 kg)   03/07/23 264 lb (119.7 kg)         TELEMETRY:  Cardiac Regularity: Regular  Cardiac Rhythm/Interpretation: NSR        ASSESSMENT:  Rosemary Loyd's weight is UP 10 LBS FROM 3/7/23. PT ADMITS TO EATING MORE DENIES ANY INCREASE SHORTNESS OF BREATH OR DISCOMFORT. PT CONFIRMED WILL CALL FOR EARLIER APPT. IF NEEDED.     Interventions completed this visit:  IV diuretics given no  Lab work obtained yes, BMP,BNP   Reviewed currently prescribed medications with patient, educated on importance of compliance and answered any questions regarding their medication  Educated on signs and symptoms of HF  Educated on low sodium diet    PLAN:  Scheduled to follow up in CHF clinic on   Future Appointments   Date Time Provider [place]   5/1/2023 10:00 AM Bhavik Mtz MD [address] [facility]   5/4/2023 10:00 AM FEROZ SIMPSON RM 3 FEROZ CARDOZA [facility]

## 2023-05-01 ENCOUNTER — OFFICE VISIT (OUTPATIENT)
Dept: CARDIOLOGY CLINIC | Age: 58
End: 2023-05-01
Payer: COMMERCIAL

## 2023-05-01 VITALS
HEIGHT: 65 IN | HEART RATE: 66 BPM | WEIGHT: 277.7 LBS | BODY MASS INDEX: 46.27 KG/M2 | DIASTOLIC BLOOD PRESSURE: 70 MMHG | SYSTOLIC BLOOD PRESSURE: 128 MMHG

## 2023-05-01 DIAGNOSIS — I50.43 CHF (CONGESTIVE HEART FAILURE), NYHA CLASS I, ACUTE ON CHRONIC, COMBINED (HCC): Primary | ICD-10-CM

## 2023-05-01 PROCEDURE — 99214 OFFICE O/P EST MOD 30 MIN: CPT | Performed by: INTERNAL MEDICINE

## 2023-05-01 PROCEDURE — 3017F COLORECTAL CA SCREEN DOC REV: CPT | Performed by: INTERNAL MEDICINE

## 2023-05-01 PROCEDURE — 1036F TOBACCO NON-USER: CPT | Performed by: INTERNAL MEDICINE

## 2023-05-01 PROCEDURE — G8417 CALC BMI ABV UP PARAM F/U: HCPCS | Performed by: INTERNAL MEDICINE

## 2023-05-01 PROCEDURE — G8427 DOCREV CUR MEDS BY ELIG CLIN: HCPCS | Performed by: INTERNAL MEDICINE

## 2023-05-01 PROCEDURE — 3078F DIAST BP <80 MM HG: CPT | Performed by: INTERNAL MEDICINE

## 2023-05-01 PROCEDURE — 3074F SYST BP LT 130 MM HG: CPT | Performed by: INTERNAL MEDICINE

## 2023-05-01 PROCEDURE — 93000 ELECTROCARDIOGRAM COMPLETE: CPT | Performed by: INTERNAL MEDICINE

## 2023-05-01 NOTE — PROGRESS NOTES
Exam:  BP (!) 140/80   Pulse 66   Ht 5' 5\" (1.651 m)   Wt 277 lb 11.2 oz (126 kg)   BMI 46.21 kg/m²   Wt Readings from Last 3 Encounters:   05/01/23 277 lb 11.2 oz (126 kg)   04/04/23 274 lb (124.3 kg)   03/29/23 271 lb 8 oz (123.2 kg)     Appearance: Awake, alert and oriented x 3, no acute respiratory distress, morbidly obeset. Skin: Intact, no rash  Head: Normocephalic, atraumatic  Eyes: EOMI, no conjunctival erythema  ENMT: No pharyngeal erythema, MMM, no rhinorrhea  Neck: Supple, no elevated JVP, no carotid bruits  Lungs: Clear to auscultation bilaterally. No wheezes, rales, or rhonchi.   Cardiac: Regular rate and rhythm, +S1S2, no murmurs apparent  Abdomen: Soft, nontender, +bowel sounds  Extremities: Moves all extremities x 4, no lower extremity edema  Neurologic: No focal motor deficits apparent, normal mood and affect, alert and oriented x 3  Peripheral Pulses: Intact posterior tibial pulses bilaterally    Laboratory Tests:  Lab Results   Component Value Date    CREATININE 1.0 04/04/2023    BUN 22 (H) 04/04/2023     04/04/2023    K 3.5 04/04/2023     04/04/2023    CO2 22 04/04/2023     Lab Results   Component Value Date/Time    MG 2.1 11/08/2022 10:44 PM     Lab Results   Component Value Date    WBC 4.9 02/03/2023    HGB 10.7 (L) 02/03/2023    HCT 31.8 (L) 02/03/2023    MCV 87.4 02/03/2023     02/03/2023     Lab Results   Component Value Date    ALT 15 11/08/2022    AST 17 11/08/2022    ALKPHOS 89 11/08/2022    BILITOT 0.4 11/08/2022     No results found for: CKTOTAL, CKMB, CKMBINDEX, TROPONINI  Lab Results   Component Value Date    INR 1.0 02/03/2023    INR 1.0 12/06/2022    INR 1.2 03/08/2015    PROTIME 10.8 02/03/2023    PROTIME 11.0 12/06/2022    PROTIME 13.2 (H) 03/08/2015     Lab Results   Component Value Date    TSH 1.920 02/03/2023     Lab Results   Component Value Date    LABA1C 5.5 02/03/2023     No results found for: EAG  Lab Results   Component Value Date    CHOL 157

## 2023-05-01 NOTE — PATIENT INSTRUCTIONS
Continue Coreg 25 mg p.o. twice daily, Entresto to 97/103 mg  p.o. twice daily, Aldactone 25 mg po daily. Continue amlodipine 5 mg p.o. daily for hypertension. Take the water pill right 20 mg  Follow low-salt diet restrict daily salt intake less than 2 g. Continue furosemide 20 mg p.o. daily and rosuvastatin 20 mg p.o. daily recent lipid panel was reviewed, LDL is at goal level. Patient was also recommended to cut down calories and and also walk on a regular basis for weight loss management. Patient was advised to get a sleep study as an outpatient to rule out obstructive sleep apnea. Follow-up with me in 6 months.

## 2023-05-04 ENCOUNTER — HOSPITAL ENCOUNTER (OUTPATIENT)
Dept: GENERAL RADIOLOGY | Age: 58
Discharge: HOME | End: 2023-05-06
Payer: COMMERCIAL

## 2023-05-04 VITALS — BODY MASS INDEX: 44.98 KG/M2 | HEIGHT: 65 IN | WEIGHT: 270 LBS

## 2023-05-04 DIAGNOSIS — Z12.31 BREAST CANCER SCREENING BY MAMMOGRAM: ICD-10-CM

## 2023-05-04 PROCEDURE — 77063 BREAST TOMOSYNTHESIS BI: CPT

## 2023-05-09 ENCOUNTER — HOSPITAL ENCOUNTER (OUTPATIENT)
Dept: OTHER | Age: 58
Setting detail: THERAPIES SERIES
Discharge: HOME | End: 2023-05-09
Payer: COMMERCIAL

## 2023-05-09 VITALS
WEIGHT: 277 LBS | OXYGEN SATURATION: 98 % | DIASTOLIC BLOOD PRESSURE: 64 MMHG | BODY MASS INDEX: 46.1 KG/M2 | HEART RATE: 75 BPM | SYSTOLIC BLOOD PRESSURE: 133 MMHG

## 2023-05-09 LAB
ANION GAP SERPL CALCULATED.3IONS-SCNC: 9 MMOL/L (ref 7–16)
BNP BLD-MCNC: 139 PG/ML (ref 0–125)
BUN SERPL-MCNC: 24 MG/DL (ref 6–20)
CALCIUM SERPL-MCNC: 8.9 MG/DL (ref 8.6–10.2)
CHLORIDE SERPL-SCNC: 110 MMOL/L (ref 98–107)
CO2 SERPL-SCNC: 22 MMOL/L (ref 22–29)
CREAT SERPL-MCNC: 1 MG/DL (ref 0.5–1)
GLUCOSE SERPL-MCNC: 107 MG/DL (ref 74–99)
POTASSIUM SERPL-SCNC: 4.1 MMOL/L (ref 3.5–5)
SODIUM SERPL-SCNC: 141 MMOL/L (ref 132–146)

## 2023-05-09 PROCEDURE — 36415 COLL VENOUS BLD VENIPUNCTURE: CPT

## 2023-05-09 PROCEDURE — 99214 OFFICE O/P EST MOD 30 MIN: CPT

## 2023-05-09 PROCEDURE — 80048 BASIC METABOLIC PNL TOTAL CA: CPT

## 2023-05-09 PROCEDURE — 83880 ASSAY OF NATRIURETIC PEPTIDE: CPT

## 2023-05-09 NOTE — PROGRESS NOTES
Congestive Heart Failure [address] [facility]   1965      Referring Provider: Ty Moreno  Primary Care Physician: [facility]  Cardiologist: Amy Flores  Nephrologist: N/A      History of Present Illness:     Barbie Phan is a 59 y.o. female with a history of HFrEF, most recent EF 40-45% 11/1/2022. Patient Story:    She does  have slight dyspnea with exertion, shortness of breath, or decline in overall functional capacity. She does  not have orthopnea, PND, nocturnal cough or hemoptysis. She does NOT have abdominal distention or bloating, early satiety, anorexia/change in appetite. She has a good response to her oral diuretic. She have trace lower extremity edema. She denies lightheadedness, dizziness. She denies palpitations, syncope or near syncope. She does not complain of chest pain, pressure, discomfort. Admits that she may not be drinking 64 oz daily. I encouraged her to drink more. She is receptive to that.      Allergies   Allergen Reactions    Bidil [Isosorb Dinitrate-Hydralazine] Other (See Comments)     Difficulty breathing    Doxycycline Swelling and Other (See Comments)    Iodine Hives     IV dye    Toradol [Ketorolac Tromethamine] Anaphylaxis    Hydralazine     Iodides Hives           Current Outpatient Medications on File Prior to Encounter   Medication Sig Dispense Refill    fluticasone (FLONASE) 50 MCG/ACT nasal spray 1 spray by Each Nostril route daily 16 g 1    rosuvastatin (CRESTOR) 40 MG tablet Take 1 tablet by mouth nightly 90 tablet 1    amLODIPine (NORVASC) 5 MG tablet Take 1 tablet by mouth daily 90 tablet 1    carvedilol (COREG) 25 MG tablet Take 1 tablet by mouth 2 times daily (with meals) 180 tablet 1    furosemide (LASIX) 20 MG tablet Take 1 tablet by mouth daily 90 tablet 1    sacubitril-valsartan (ENTRESTO)  MG per tablet Take 1 tablet by mouth 2 times daily 180 tablet 1    spironolactone (ALDACTONE) 25 MG tablet Take 1

## 2023-05-09 NOTE — PLAN OF CARE
Problem: Chronic Conditions and Co-morbidities  Goal: Patient's chronic conditions and co-morbidity symptoms are monitored and maintained or improved  Flowsheets (Taken 5/9/2023 2470)  Care Plan - Patient's Chronic Conditions and Co-Morbidity Symptoms are Monitored and Maintained or Improved: Monitor and assess patient's chronic conditions and comorbid symptoms for stability, deterioration, or improvement

## 2023-06-09 ENCOUNTER — HOSPITAL ENCOUNTER (OUTPATIENT)
Dept: OTHER | Age: 58
Setting detail: THERAPIES SERIES
Discharge: HOME | End: 2023-06-09
Payer: COMMERCIAL

## 2023-06-09 VITALS
RESPIRATION RATE: 18 BRPM | HEART RATE: 60 BPM | WEIGHT: 279 LBS | SYSTOLIC BLOOD PRESSURE: 142 MMHG | BODY MASS INDEX: 46.43 KG/M2 | DIASTOLIC BLOOD PRESSURE: 72 MMHG | OXYGEN SATURATION: 96 %

## 2023-06-09 LAB
ANION GAP SERPL CALCULATED.3IONS-SCNC: 9 MMOL/L (ref 7–16)
BNP BLD-MCNC: 111 PG/ML (ref 0–125)
BUN SERPL-MCNC: 20 MG/DL (ref 6–20)
CALCIUM SERPL-MCNC: 9.1 MG/DL (ref 8.6–10.2)
CHLORIDE SERPL-SCNC: 109 MMOL/L (ref 98–107)
CO2 SERPL-SCNC: 24 MMOL/L (ref 22–29)
CREAT SERPL-MCNC: 1 MG/DL (ref 0.5–1)
GLUCOSE SERPL-MCNC: 100 MG/DL (ref 74–99)
POTASSIUM SERPL-SCNC: 4.2 MMOL/L (ref 3.5–5)
SODIUM SERPL-SCNC: 142 MMOL/L (ref 132–146)

## 2023-06-09 PROCEDURE — 99214 OFFICE O/P EST MOD 30 MIN: CPT

## 2023-06-09 PROCEDURE — 36415 COLL VENOUS BLD VENIPUNCTURE: CPT

## 2023-06-09 PROCEDURE — 83880 ASSAY OF NATRIURETIC PEPTIDE: CPT

## 2023-06-09 PROCEDURE — 80048 BASIC METABOLIC PNL TOTAL CA: CPT

## 2023-06-09 NOTE — PROGRESS NOTES
Congestive Heart Failure [address] [facility]   1965          Referring Provider: Kelly Putnam  Primary Care Physician: [facility]  Cardiologist: Eduardo Younger  Nephrologist: N/A        History of Present Illness:     Efren Daily is a 59 y.o. female with a history of HFrEF, most recent EF 40-45% 11/1/2022. Patient Story:    She does  have   slight dyspnea with exertion, shortness of breath, or decline in overall functional capacity. She does  not have orthopnea, PND, nocturnal cough or hemoptysis. She does NOT have abdominal distention or bloating, early satiety, anorexia/change in appetite. She has a good response to her oral diuretic. She does  haveslight  lower extremity edema. She admits to occasional lightheadedness, dizziness. She denies palpitations, syncope or near syncope. She does not complain of chest pain, pressure, discomfort. Admits that she may not be drinking 64 oz daily. I encouraged her to drink more. She is receptive to that.      Allergies   Allergen Reactions    Bidil [Isosorb Dinitrate-Hydralazine] Other (See Comments)     Difficulty breathing    Doxycycline Swelling and Other (See Comments)    Iodine Hives     IV dye    Toradol [Ketorolac Tromethamine] Anaphylaxis    Hydralazine     Iodides Hives           Current Outpatient Medications on File Prior to Encounter   Medication Sig Dispense Refill    fluticasone (FLONASE) 50 MCG/ACT nasal spray 1 spray by Each Nostril route daily 16 g 1    rosuvastatin (CRESTOR) 40 MG tablet Take 1 tablet by mouth nightly 90 tablet 1    amLODIPine (NORVASC) 5 MG tablet Take 1 tablet by mouth daily 90 tablet 1    carvedilol (COREG) 25 MG tablet Take 1 tablet by mouth 2 times daily (with meals) 180 tablet 1    furosemide (LASIX) 20 MG tablet Take 1 tablet by mouth daily 90 tablet 1    sacubitril-valsartan (ENTRESTO)  MG per tablet Take 1 tablet by mouth 2 times daily 180 tablet 1    spironolactone

## 2023-06-09 NOTE — PLAN OF CARE
Problem: Chronic Conditions and Co-morbidities  Goal: Patient's chronic conditions and co-morbidity symptoms are monitored and maintained or improved  Flowsheets (Taken 6/9/2023 8970)  Care Plan - Patient's Chronic Conditions and Co-Morbidity Symptoms are Monitored and Maintained or Improved: Monitor and assess patient's chronic conditions and comorbid symptoms for stability, deterioration, or improvement

## 2023-07-05 ENCOUNTER — OFFICE VISIT (OUTPATIENT)
Dept: INTERNAL MEDICINE | Age: 58
End: 2023-07-05
Payer: COMMERCIAL

## 2023-07-05 VITALS
HEIGHT: 65 IN | BODY MASS INDEX: 46.32 KG/M2 | RESPIRATION RATE: 20 BRPM | TEMPERATURE: 97.6 F | HEART RATE: 59 BPM | WEIGHT: 278 LBS | SYSTOLIC BLOOD PRESSURE: 130 MMHG | DIASTOLIC BLOOD PRESSURE: 62 MMHG | OXYGEN SATURATION: 99 %

## 2023-07-05 DIAGNOSIS — I10 PRIMARY HYPERTENSION: ICD-10-CM

## 2023-07-05 DIAGNOSIS — J45.909 UNCOMPLICATED ASTHMA, UNSPECIFIED ASTHMA SEVERITY, UNSPECIFIED WHETHER PERSISTENT: Primary | ICD-10-CM

## 2023-07-05 DIAGNOSIS — G62.9 NEUROPATHY: ICD-10-CM

## 2023-07-05 DIAGNOSIS — E78.5 HYPERLIPIDEMIA, UNSPECIFIED HYPERLIPIDEMIA TYPE: ICD-10-CM

## 2023-07-05 DIAGNOSIS — I50.43 CHF (CONGESTIVE HEART FAILURE), NYHA CLASS I, ACUTE ON CHRONIC, COMBINED (HCC): ICD-10-CM

## 2023-07-05 PROBLEM — J10.1 INFLUENZA B: Status: RESOLVED | Noted: 2022-10-30 | Resolved: 2023-07-05

## 2023-07-05 PROBLEM — I50.9 ACUTE CONGESTIVE HEART FAILURE, UNSPECIFIED HEART FAILURE TYPE (HCC): Status: RESOLVED | Noted: 2022-10-30 | Resolved: 2023-07-05

## 2023-07-05 PROBLEM — I16.1 HYPERTENSIVE EMERGENCY: Status: RESOLVED | Noted: 2023-02-04 | Resolved: 2023-07-05

## 2023-07-05 PROBLEM — I50.9 HEART FAILURE (HCC): Status: RESOLVED | Noted: 2022-11-02 | Resolved: 2023-07-05

## 2023-07-05 PROCEDURE — 3078F DIAST BP <80 MM HG: CPT

## 2023-07-05 PROCEDURE — 3017F COLORECTAL CA SCREEN DOC REV: CPT

## 2023-07-05 PROCEDURE — 3074F SYST BP LT 130 MM HG: CPT

## 2023-07-05 PROCEDURE — 99214 OFFICE O/P EST MOD 30 MIN: CPT

## 2023-07-05 PROCEDURE — G8417 CALC BMI ABV UP PARAM F/U: HCPCS

## 2023-07-05 PROCEDURE — G8427 DOCREV CUR MEDS BY ELIG CLIN: HCPCS

## 2023-07-05 PROCEDURE — 1036F TOBACCO NON-USER: CPT

## 2023-07-05 PROCEDURE — 99212 OFFICE O/P EST SF 10 MIN: CPT

## 2023-07-05 RX ORDER — AMLODIPINE BESYLATE 5 MG/1
5 TABLET ORAL DAILY
Qty: 90 TABLET | Refills: 1 | Status: SHIPPED | OUTPATIENT
Start: 2023-07-05

## 2023-07-05 RX ORDER — ROSUVASTATIN CALCIUM 40 MG/1
40 TABLET, COATED ORAL NIGHTLY
Qty: 90 TABLET | Refills: 1 | Status: SHIPPED | OUTPATIENT
Start: 2023-07-05

## 2023-07-05 RX ORDER — FUROSEMIDE 20 MG/1
20 TABLET ORAL DAILY
Qty: 90 TABLET | Refills: 1 | Status: SHIPPED | OUTPATIENT
Start: 2023-07-05

## 2023-07-05 RX ORDER — GABAPENTIN 100 MG/1
100 CAPSULE ORAL NIGHTLY
Qty: 30 CAPSULE | Refills: 2 | Status: SHIPPED | OUTPATIENT
Start: 2023-07-05 | End: 2023-10-03

## 2023-07-05 RX ORDER — ASPIRIN 81 MG/1
81 TABLET, CHEWABLE ORAL DAILY
Qty: 30 TABLET | Refills: 3 | Status: SHIPPED | OUTPATIENT
Start: 2023-07-05

## 2023-07-05 RX ORDER — ZOSTER VACCINE RECOMBINANT, ADJUVANTED 50 MCG/0.5
0.5 KIT INTRAMUSCULAR SEE ADMIN INSTRUCTIONS
Qty: 0.5 ML | Refills: 0 | Status: CANCELLED | OUTPATIENT
Start: 2023-07-05 | End: 2024-01-01

## 2023-07-05 RX ORDER — ALBUTEROL SULFATE 90 UG/1
2 AEROSOL, METERED RESPIRATORY (INHALATION) EVERY 6 HOURS PRN
Qty: 1 EACH | Refills: 6 | Status: SHIPPED | OUTPATIENT
Start: 2023-07-05

## 2023-07-05 RX ORDER — SPIRONOLACTONE 25 MG/1
25 TABLET ORAL DAILY
Qty: 90 TABLET | Refills: 1 | Status: SHIPPED | OUTPATIENT
Start: 2023-07-05

## 2023-07-05 RX ORDER — CARVEDILOL 25 MG/1
25 TABLET ORAL 2 TIMES DAILY WITH MEALS
Qty: 180 TABLET | Refills: 1 | Status: SHIPPED | OUTPATIENT
Start: 2023-07-05

## 2023-07-05 ASSESSMENT — ENCOUNTER SYMPTOMS
BACK PAIN: 1
SHORTNESS OF BREATH: 0
NAUSEA: 0
ABDOMINAL PAIN: 0
COUGH: 0
CHEST TIGHTNESS: 0
VOMITING: 0

## 2023-07-05 NOTE — PATIENT INSTRUCTIONS
Thank you for coming to your follow up appointment   Please take your medications as directed and keep your follow up appointment in 3 months . Call our office if you have any questions or concerns at [phone]  Have blood work done prior to next appointment. Follow up with [facility] for back pain, CHF clinic and Cardiology clinic as scheduled. Have a nice day.         Taiwo Lozano MD

## 2023-07-05 NOTE — PROGRESS NOTES
[address] [facility] Internal Medicine      SUBJECTIVE:  Ruby Mobley (:  1965) is a 59 y.o. female here for evaluation of the following chief complaint(s):  Follow-up    Patient presented to the internal medicine clinic for regular follow-up visit. On presentation the patient complained of having bilateral leg swelling , numbness and tingling sensation in the back of her thighs and chronic back pain. Patient has been following up with CHF clinic and cardiology clinic for HFmEF. She complained of having bilateral lower limb swelling which has been chronic but has been increasing. She was recently started on Jardiance 10 mg 2023 last month for CHF clinic. Swelling has not improved despite the medication. She may be following up with CHF clinic on 2023. Planned to increase her furosemide to 40 mg daily until she sees follows up with CHF clinic. Patient started developing numbness and tingling sensation in the back of her thighs along with chronic back pain. She is following up with [facility] Dr. Jessica Edward for that. MRI of the lumbar spine was done recently which showed multilevel foraminal stenosis along with disc protrusion. She was referred to physical therapy for her bilateral knee pain and also for the back pain. She completed her 10 days of physical therapy but still was complaining of on and off pain. DEXA scan was done recently. She wanted all the results to be seen by me that was done in [place]. All medical records to be obtained from [facility]. She was planned to be started on gabapentin 100 mg daily    As per cardiology recommendation patient was already planned for sleep study before. She could not Attend due to family issues but stated that she will call to the clinic again to set up a new date. Review of Systems   Constitutional:  Negative for chills, diaphoresis and fever.    Respiratory:  Negative for cough, chest

## 2023-07-05 NOTE — PROGRESS NOTES
[facility] [facility]  Internal Medicine Residency Clinic    Attending Physician Statement  I have discussed the case, including pertinent history and exam findings with the resident physician. I have seen and examined the patient and the key elements of the encounter have been performed by me. I agree with the assessment, plan and orders as documented by the resident. I have reviewed all pertinent PMHx, PSHx, FamHx, SocialHx, medications, and allergies and updated history as appropriate. Patient here for routine follow up of medical problems. HFmrEF, non-ischemic. Patient is tolerating GDMT. Patient has 1 lb weight gain and states that she has increasing BLE edema. Renal function has been normal. Advised today to increase lasix BID until seen by CHF clinic on Friday. Breath sounds clear, no JVD, no sacral edema. Rpt BNP. Also discussed compression socks. Patient will call to reschedule sleep study. Mild TR, Mild AS - last Echo 2022  HTN, controlled  HLD, tolerating statin  Asthma, controlled  Bilateral knee OA and chronic back pain. Patient will need to follow up with [facility] regarding further management of moderate spondylosis. Patient has worsening neuropathy - will trial low dose gabapentin. Rpt colonoscopy next year  TIA - competed DAPT x 21 days. Continue asa, statin. Remainder of medical problems as per resident note. Sofia Shell MD  7/5/2023 9:16 AM

## 2023-07-07 ENCOUNTER — HOSPITAL ENCOUNTER (OUTPATIENT)
Dept: OTHER | Age: 58
Setting detail: THERAPIES SERIES
Discharge: HOME | End: 2023-07-07
Payer: COMMERCIAL

## 2023-07-07 VITALS
SYSTOLIC BLOOD PRESSURE: 164 MMHG | WEIGHT: 280 LBS | RESPIRATION RATE: 18 BRPM | DIASTOLIC BLOOD PRESSURE: 77 MMHG | BODY MASS INDEX: 46.59 KG/M2 | OXYGEN SATURATION: 99 % | HEART RATE: 57 BPM

## 2023-07-07 LAB
ANION GAP SERPL CALCULATED.3IONS-SCNC: 11 MMOL/L (ref 7–16)
BNP BLD-MCNC: 107 PG/ML (ref 0–125)
BUN SERPL-MCNC: 19 MG/DL (ref 6–23)
CALCIUM SERPL-MCNC: 9.4 MG/DL (ref 8.6–10.2)
CHLORIDE SERPL-SCNC: 110 MMOL/L (ref 98–107)
CO2 SERPL-SCNC: 22 MMOL/L (ref 22–29)
CREAT SERPL-MCNC: 1 MG/DL (ref 0.5–1)
GLUCOSE SERPL-MCNC: 95 MG/DL (ref 74–99)
POTASSIUM SERPL-SCNC: 3.9 MMOL/L (ref 3.5–5)
SODIUM SERPL-SCNC: 143 MMOL/L (ref 132–146)

## 2023-07-07 PROCEDURE — 80048 BASIC METABOLIC PNL TOTAL CA: CPT

## 2023-07-07 PROCEDURE — 83880 ASSAY OF NATRIURETIC PEPTIDE: CPT

## 2023-07-07 PROCEDURE — 36415 COLL VENOUS BLD VENIPUNCTURE: CPT

## 2023-07-07 PROCEDURE — 99214 OFFICE O/P EST MOD 30 MIN: CPT

## 2023-07-07 NOTE — PROGRESS NOTES
Congestive Heart Failure [address] [facility]   1965          Referring Provider: Marizol Batista  Primary Care Physician: [facility]  Cardiologist: Kaya Beckham  Nephrologist: N/A        History of Present Illness:     Sidney Soulier is a 59 y.o. female with a history of HFrEF, most recent EF 40-45% 11/1/2022. Patient Story:    She does  have slight dyspnea with exertion, shortness of breath, or decline in overall functional capacity. She does  not have orthopnea, PND, nocturnal cough or hemoptysis. She does NOT have abdominal distention or bloating, early satiety, anorexia/change in appetite. She has a good response to her oral diuretic. She does  have trace lower extremity edema. She admits to occasional lightheadedness, dizziness. She denies palpitations, syncope or near syncope. She does not complain of chest pain, pressure, discomfort. Admits that she may not be drinking 64 oz daily. I encouraged her to drink more. She is receptive to that.      Allergies   Allergen Reactions    Bidil [Isosorb Dinitrate-Hydralazine] Other (See Comments)     Difficulty breathing    Doxycycline Swelling and Other (See Comments)    Iodine Hives     IV dye    Toradol [Ketorolac Tromethamine] Anaphylaxis    Hydralazine     Iodides Hives           Current Outpatient Medications on File Prior to Encounter   Medication Sig Dispense Refill    rosuvastatin (CRESTOR) 40 MG tablet Take 1 tablet by mouth nightly 90 tablet 1    furosemide (LASIX) 20 MG tablet Take 1 tablet by mouth daily 90 tablet 1    amLODIPine (NORVASC) 5 MG tablet Take 1 tablet by mouth daily 90 tablet 1    carvedilol (COREG) 25 MG tablet Take 1 tablet by mouth 2 times daily (with meals) 180 tablet 1    sacubitril-valsartan (ENTRESTO)  MG per tablet Take 1 tablet by mouth 2 times daily 180 tablet 1    spironolactone (ALDACTONE) 25 MG tablet Take 1 tablet by mouth daily 90 tablet 1    aspirin 81 MG chewable tablet

## 2023-07-07 NOTE — PLAN OF CARE
Problem: Chronic Conditions and Co-morbidities  Goal: Patient's chronic conditions and co-morbidity symptoms are monitored and maintained or improved  Flowsheets (Taken 7/7/2023 8459)  Care Plan - Patient's Chronic Conditions and Co-Morbidity Symptoms are Monitored and Maintained or Improved: Monitor and assess patient's chronic conditions and comorbid symptoms for stability, deterioration, or improvement

## 2023-07-10 ENCOUNTER — TELEPHONE (OUTPATIENT)
Dept: INTERNAL MEDICINE CLINIC | Age: 58
End: 2023-07-10

## 2023-07-10 NOTE — TELEPHONE ENCOUNTER
Patient was called on 07/07/2023 to follow up on her B/L pitting edema. Patient had recently followed up in the clinic on 7/5/2023 and was advised to increase Lasix BID until she was seen by CHF clinic. She followed up with CHF clinic on 7/72023 where Pro-BNP was ordered. Patient stated that she was having mild improvement on her swelling. She was advised to continue to take twice daily dosing of Lasix until the result was back. Result reviewed today on 7/10/2023. Pro BNP stable and improved at 107 compared to 111 in the past. She was called again today and notified to discontinue twice dosing and start on Lasix once daily. She agreed upon all of the plan.

## 2023-08-11 ENCOUNTER — HOSPITAL ENCOUNTER (EMERGENCY)
Age: 58
Discharge: HOME | End: 2023-08-11
Payer: COMMERCIAL

## 2023-08-11 ENCOUNTER — APPOINTMENT (OUTPATIENT)
Dept: CT IMAGING | Age: 58
End: 2023-08-11
Payer: COMMERCIAL

## 2023-08-11 ENCOUNTER — HOSPITAL ENCOUNTER (OUTPATIENT)
Dept: OTHER | Age: 58
Setting detail: THERAPIES SERIES
Discharge: HOME | End: 2023-08-11

## 2023-08-11 VITALS
HEART RATE: 58 BPM | OXYGEN SATURATION: 98 % | BODY MASS INDEX: 46.65 KG/M2 | WEIGHT: 280 LBS | SYSTOLIC BLOOD PRESSURE: 152 MMHG | TEMPERATURE: 97.8 F | RESPIRATION RATE: 16 BRPM | HEIGHT: 65 IN | DIASTOLIC BLOOD PRESSURE: 78 MMHG

## 2023-08-11 DIAGNOSIS — M51.36 DEGENERATIVE DISC DISEASE, LUMBAR: ICD-10-CM

## 2023-08-11 DIAGNOSIS — M54.41 ACUTE RIGHT-SIDED BACK PAIN WITH SCIATICA: Primary | ICD-10-CM

## 2023-08-11 PROCEDURE — 99284 EMERGENCY DEPT VISIT MOD MDM: CPT

## 2023-08-11 PROCEDURE — 72131 CT LUMBAR SPINE W/O DYE: CPT

## 2023-08-11 PROCEDURE — 6370000000 HC RX 637 (ALT 250 FOR IP): Performed by: PHYSICIAN ASSISTANT

## 2023-08-11 RX ORDER — OXYCODONE HYDROCHLORIDE AND ACETAMINOPHEN 5; 325 MG/1; MG/1
1 TABLET ORAL ONCE
Status: COMPLETED | OUTPATIENT
Start: 2023-08-11 | End: 2023-08-11

## 2023-08-11 RX ORDER — METHOCARBAMOL 750 MG/1
750 TABLET, FILM COATED ORAL NIGHTLY PRN
Qty: 10 TABLET | Refills: 0 | Status: SHIPPED | OUTPATIENT
Start: 2023-08-11 | End: 2023-08-21

## 2023-08-11 RX ORDER — PREDNISONE 20 MG/1
60 TABLET ORAL ONCE
Status: COMPLETED | OUTPATIENT
Start: 2023-08-11 | End: 2023-08-11

## 2023-08-11 RX ORDER — PREDNISONE 20 MG/1
40 TABLET ORAL DAILY
Qty: 10 TABLET | Refills: 0 | Status: SHIPPED | OUTPATIENT
Start: 2023-08-11 | End: 2023-08-16

## 2023-08-11 RX ORDER — LIDOCAINE 50 MG/G
1 PATCH TOPICAL EVERY 24 HOURS
Qty: 30 PATCH | Refills: 0 | Status: SHIPPED | OUTPATIENT
Start: 2023-08-11 | End: 2023-09-10

## 2023-08-11 RX ORDER — METHOCARBAMOL 500 MG/1
750 TABLET, FILM COATED ORAL ONCE
Status: COMPLETED | OUTPATIENT
Start: 2023-08-11 | End: 2023-08-11

## 2023-08-11 RX ADMIN — METHOCARBAMOL 750 MG: 500 TABLET ORAL at 11:55

## 2023-08-11 RX ADMIN — PREDNISONE 60 MG: 20 TABLET ORAL at 11:55

## 2023-08-11 RX ADMIN — OXYCODONE AND ACETAMINOPHEN 1 TABLET: 5; 325 TABLET ORAL at 11:55

## 2023-08-11 ASSESSMENT — PAIN DESCRIPTION - ORIENTATION: ORIENTATION: LOWER

## 2023-08-11 ASSESSMENT — PAIN DESCRIPTION - FREQUENCY: FREQUENCY: CONTINUOUS

## 2023-08-11 ASSESSMENT — PAIN DESCRIPTION - DESCRIPTORS: DESCRIPTORS: PRESSURE;SHOOTING

## 2023-08-11 ASSESSMENT — PAIN DESCRIPTION - PAIN TYPE: TYPE: ACUTE PAIN

## 2023-08-11 ASSESSMENT — PAIN SCALES - GENERAL: PAINLEVEL_OUTOF10: 8

## 2023-08-11 ASSESSMENT — PAIN - FUNCTIONAL ASSESSMENT
PAIN_FUNCTIONAL_ASSESSMENT: PREVENTS OR INTERFERES WITH ALL ACTIVE AND SOME PASSIVE ACTIVITIES
PAIN_FUNCTIONAL_ASSESSMENT: 0-10

## 2023-08-11 ASSESSMENT — PAIN DESCRIPTION - ONSET: ONSET: ON-GOING

## 2023-08-11 ASSESSMENT — PAIN DESCRIPTION - LOCATION: LOCATION: BACK

## 2023-08-11 NOTE — ED PROVIDER NOTES
Independent HE Visit. [facility]  Department of Emergency Medicine   ED  Encounter Note  Admit Date/RoomTime: 2023  2:35 PM  ED Room: [place] [address]    NAME: Shelley Navas  : 1965  MRN: [id number]     Chief Complaint:  Back Pain (Lower back pain for 3 days. )    History of Present Illness        Shelley Navas is a 59 y.o. old female with a prior history of chronic back pain, presents to the emergency department by private vehicle, for non-traumatic acute-on-chronic, sharp lower bilateral back pain lumbar spine pain to both buttocks, to both thighs, to the right knee, to the right lower leg, to the right foot, for 3 day(s) prior to arrival.  There has been no recent injury as it relates to today's visit. Patient does have a longstanding history of back problems. Patient did have an MRI in March that was found to show degenerative changes and some bulging disc but that are mild. Patient states she did do physical therapy and did not have much improvement. Patient states she stopped going. Since onset the symptoms have been remaining constant and is mild-to-moderate in severity. She has associated signs & symptoms of difficulty with ambulation. She also states that she had numbness and tingling  on the right side more than the left side. She denies any bladder or bowel incontinence , fever, abdominal pain, or saddle paresthesias . The pain is aggraveated by any movement and coughing and relieved by bending forward. She is not currently enrolled in an pain management program or managed by PCP or back specialist.  Denies recent trauma recent travel recent sick contacts. Patient denies chest pain shortness of breath and palpitation. ROS   Pertinent positives and negatives are stated within HPI, all other systems reviewed and are negative.     Past Medical History:  has a past medical history of Abnormal carotid pulse, Asthma, Depression, Dyslipidemia, Hypertension, Mitral

## 2023-08-25 ENCOUNTER — HOSPITAL ENCOUNTER (OUTPATIENT)
Dept: OTHER | Age: 58
Setting detail: THERAPIES SERIES
Discharge: HOME | End: 2023-08-25

## 2023-08-25 NOTE — PLAN OF CARE
Patient a no show to today's appointment despite reminder call. Called patient but no answer, left message requesting return call to reschedule.

## 2023-09-24 DIAGNOSIS — G62.9 NEUROPATHY: ICD-10-CM

## 2023-09-25 RX ORDER — GABAPENTIN 100 MG/1
CAPSULE ORAL
Qty: 30 CAPSULE | Refills: 0 | Status: SHIPPED
Start: 2023-09-25 | End: 2023-10-23

## 2023-09-25 NOTE — TELEPHONE ENCOUNTER
Last Appointment:  7/5/2023  Future Appointments   Date Time Provider [address]   10/16/2023  8:30 AM Grace Galeas MD [facility]      Gabapentin filled 7/5/23 #30 #2 refills (90 days) 10/3/23  Next appt 10/16/23 (days between 10/3 and 10/16/23 = 13 days)

## 2023-10-16 ENCOUNTER — OFFICE VISIT (OUTPATIENT)
Dept: INTERNAL MEDICINE | Age: 58
End: 2023-10-16
Payer: COMMERCIAL

## 2023-10-16 VITALS
TEMPERATURE: 97.2 F | OXYGEN SATURATION: 95 % | BODY MASS INDEX: 47.98 KG/M2 | HEART RATE: 72 BPM | RESPIRATION RATE: 20 BRPM | HEIGHT: 65 IN | SYSTOLIC BLOOD PRESSURE: 130 MMHG | WEIGHT: 288 LBS | DIASTOLIC BLOOD PRESSURE: 75 MMHG

## 2023-10-16 DIAGNOSIS — Z23 FLU VACCINE NEED: ICD-10-CM

## 2023-10-16 DIAGNOSIS — E78.5 HYPERLIPIDEMIA, UNSPECIFIED HYPERLIPIDEMIA TYPE: ICD-10-CM

## 2023-10-16 DIAGNOSIS — J45.909 UNCOMPLICATED ASTHMA, UNSPECIFIED ASTHMA SEVERITY, UNSPECIFIED WHETHER PERSISTENT: ICD-10-CM

## 2023-10-16 DIAGNOSIS — I10 PRIMARY HYPERTENSION: ICD-10-CM

## 2023-10-16 DIAGNOSIS — Z79.899 NEW MEDICATION ADDED: ICD-10-CM

## 2023-10-16 DIAGNOSIS — M25.569 KNEE PAIN, UNSPECIFIED CHRONICITY, UNSPECIFIED LATERALITY: ICD-10-CM

## 2023-10-16 DIAGNOSIS — I50.43 CHF (CONGESTIVE HEART FAILURE), NYHA CLASS I, ACUTE ON CHRONIC, COMBINED (HCC): ICD-10-CM

## 2023-10-16 DIAGNOSIS — D22.9 ATYPICAL MOLE: ICD-10-CM

## 2023-10-16 DIAGNOSIS — J30.9 ALLERGIC RHINITIS, UNSPECIFIED SEASONALITY, UNSPECIFIED TRIGGER: ICD-10-CM

## 2023-10-16 DIAGNOSIS — E66.01 CLASS 3 SEVERE OBESITY WITH BODY MASS INDEX (BMI) OF 45.0 TO 49.9 IN ADULT, UNSPECIFIED OBESITY TYPE, UNSPECIFIED WHETHER SERIOUS COMORBIDITY PRESENT (HCC): ICD-10-CM

## 2023-10-16 DIAGNOSIS — J45.20 MILD INTERMITTENT ASTHMA WITHOUT COMPLICATION: ICD-10-CM

## 2023-10-16 DIAGNOSIS — I50.20 SYSTOLIC CONGESTIVE HEART FAILURE, UNSPECIFIED HF CHRONICITY (HCC): ICD-10-CM

## 2023-10-16 DIAGNOSIS — M48.061 SPINAL STENOSIS OF LUMBAR REGION, UNSPECIFIED WHETHER NEUROGENIC CLAUDICATION PRESENT: Primary | ICD-10-CM

## 2023-10-16 DIAGNOSIS — R09.81 NASAL CONGESTION: ICD-10-CM

## 2023-10-16 PROCEDURE — 3074F SYST BP LT 130 MM HG: CPT

## 2023-10-16 PROCEDURE — 99213 OFFICE O/P EST LOW 20 MIN: CPT

## 2023-10-16 PROCEDURE — 3078F DIAST BP <80 MM HG: CPT

## 2023-10-16 PROCEDURE — 99214 OFFICE O/P EST MOD 30 MIN: CPT

## 2023-10-16 PROCEDURE — 90686 IIV4 VACC NO PRSV 0.5 ML IM: CPT | Performed by: INTERNAL MEDICINE

## 2023-10-16 RX ORDER — AMLODIPINE BESYLATE 5 MG/1
5 TABLET ORAL DAILY
Qty: 90 TABLET | Refills: 1 | Status: SHIPPED | OUTPATIENT
Start: 2023-10-16

## 2023-10-16 RX ORDER — FLUTICASONE PROPIONATE 50 MCG
1 SPRAY, SUSPENSION (ML) NASAL DAILY
Qty: 16 G | Refills: 5 | Status: SHIPPED | OUTPATIENT
Start: 2023-10-16

## 2023-10-16 RX ORDER — SPIRONOLACTONE 25 MG/1
25 TABLET ORAL DAILY
Qty: 90 TABLET | Refills: 1 | Status: SHIPPED | OUTPATIENT
Start: 2023-10-16

## 2023-10-16 RX ORDER — FUROSEMIDE 20 MG/1
20 TABLET ORAL DAILY
Qty: 90 TABLET | Refills: 1 | Status: SHIPPED | OUTPATIENT
Start: 2023-10-16

## 2023-10-16 RX ORDER — CETIRIZINE HYDROCHLORIDE 10 MG/1
10 TABLET ORAL DAILY PRN
Qty: 90 TABLET | Refills: 0 | Status: SHIPPED | OUTPATIENT
Start: 2023-10-16

## 2023-10-16 RX ORDER — ROSUVASTATIN CALCIUM 40 MG/1
40 TABLET, COATED ORAL NIGHTLY
Qty: 90 TABLET | Refills: 1 | Status: SHIPPED | OUTPATIENT
Start: 2023-10-16

## 2023-10-16 RX ORDER — ASPIRIN 81 MG/1
81 TABLET, CHEWABLE ORAL DAILY
Qty: 90 TABLET | Refills: 0 | Status: SHIPPED | OUTPATIENT
Start: 2023-10-16

## 2023-10-16 RX ORDER — ALBUTEROL SULFATE 90 UG/1
2 AEROSOL, METERED RESPIRATORY (INHALATION) EVERY 6 HOURS PRN
Qty: 1 EACH | Refills: 6 | Status: SHIPPED | OUTPATIENT
Start: 2023-10-16

## 2023-10-16 RX ORDER — CARVEDILOL 25 MG/1
25 TABLET ORAL 2 TIMES DAILY WITH MEALS
Qty: 180 TABLET | Refills: 1 | Status: SHIPPED | OUTPATIENT
Start: 2023-10-16

## 2023-10-16 ASSESSMENT — ENCOUNTER SYMPTOMS
BACK PAIN: 1
WHEEZING: 0
NAUSEA: 0
ABDOMINAL PAIN: 0
SHORTNESS OF BREATH: 0
COUGH: 0
DIARRHEA: 0
CHEST TIGHTNESS: 0
CONSTIPATION: 0

## 2023-10-16 NOTE — PATIENT INSTRUCTIONS
Thank you for coming to your follow up appointment   Please take your medications as directed and keep your follow up appointment in 1/29/2024. Call our office if you have any questions or concerns at [phone]  Follow up with Cardiology CHF clinic, pain management and Physical therapy as scheduled.     Alma Sanders MD

## 2023-10-16 NOTE — PROGRESS NOTES
[address] [place] [address]  Internal Medicine Residency Clinic    Attending Physician Statement  I have discussed the case, including pertinent history and exam findings with the resident physician. I agree with the assessment, plan and orders as documented by the resident. I have reviewed the relevant PMHx, PSHx, FamHx, SocialHx, medications, and allergies and updated history as appropriate. Patient presents for routine follow up of medical problems. Lumbar spinal stenosis   Improved symptoms s/p medrol pack from [facility] [address] for continued PT and interested in referral to see pain management      Osteopenia   Obtain DEXA scan result (from [facility])    Obesity class 3 BMI 47  Recently retired and sedentary lifestyle, counseled diet mods and really needs aggressive weight loss therapy to also improve back pain; referral to medical weight loss clinic. HFmEF  Continues on Entresto, aldactone, Jardiance, Coreg 25 mg BID    HTN    Continues on amlodipine 5 mg    Screening:   Flu shot today in office    Remainder of medical problems as per resident note.     Josefa Stahl DO  10/16/2023 9:23 AM

## 2023-10-16 NOTE — PROGRESS NOTES
[id number] [facility] Internal Medicine      SUBJECTIVE:  Sindy Lara (:  1965) is a 59 y.o. female here for evaluation of the following chief complaint(s):  Hypertension, Asthma, and Mole (Left side of face near ear is itching and partially red at times)    Patient presented to the internal medicine clinic for regular follow up visit. Patient recently visited to the ED with complaints of right sided back pain on 2023. Patient stated she did pull a muscle while doing some physical therapy at home and started having acute back pain. She was prescribed with medrol pack, muscle relaxant and lidocaine patch. She stated medrol pack to help her pain but muscle relaxant and lidocaine patch were not helpful. She stated improvement in back pain but stated of having on and off pain in the left lateral leg with some radiation to the feet. No numbness or tingling sensation. Examination showed tenderness in the lateral left leg. Patient did miss 2 of her last appointment with the CHF clinic as she had severe back pain. She was planning to set up a new appointment. She denied of any pitting edema, SOB, chest pain and has been complaint with her regular medications. Documents from the [facility] was obtained and results were reviewed. Patient stated of being retired and off work. She was mildly stressed about her house bills and her financial situation after being off work. Stated of feeling low and sleep disturbance but denied of anorexia, loss of concentration, suicidal ideation. Patient was advised to talk to a counselor in the clinic today which she denied and was not interested in starting other forms of treatment for possible depressive symptoms. Review of Systems   Constitutional:  Negative for chills, fatigue, fever and unexpected weight change. HENT:  Negative for postnasal drip.     Respiratory:  Negative for cough, chest tightness, shortness of breath and

## 2023-10-23 ENCOUNTER — HOSPITAL ENCOUNTER (OUTPATIENT)
Dept: PHYSICAL THERAPY | Age: 58
Setting detail: THERAPIES SERIES
Discharge: HOME | End: 2023-10-23
Payer: COMMERCIAL

## 2023-10-23 PROCEDURE — 97161 PT EVAL LOW COMPLEX 20 MIN: CPT

## 2023-10-23 NOTE — PROGRESS NOTES
[address]                Phone: [phone]   Fax: [phone]    Physical Therapy  Out Patient Initial Evaluation    Date:  10/23/2023    Patient Name:  Chris Bean    :  1965  MRN: [id number]    DIAGNOSIS:  Spinal stenosis lumbar  EVALUATION DATE:  10/23/23  REFERRING PHYSICIAN:  Dr Johnathon Gerard MD  ONSET DATE:    CERTIFICATION PERIOD:  10/23/23 to 23    PROBLEMS FOUND DURING EVALUATION  Pain low back 6/10 with radicular RTLE pain to ankle levels  Decreased pain free lumbar ROM 2/2 pain, soft tissue restrictions. Lumbar flex 50*, Ext +15*, RT/LT SB 15*  Functional ROM limitations 2/2 obesity BLE/hips/trunk   MMT impairment core 2/5 supine, B/L hips 3-3+/5  Dec static and dynamic stand cari 2/2 LBP and RTLE pain limited <5'    SHORT TERM GOALS (2 wks)  Imp pain in low back by 20% or better witrh 10% reduction RTLE pain  Imp pain free trunk ROM above planes 10% or >  MMT gains B/L LE hips 3+-/5,   Indep HEP  Imp static/dynamic stand cari by 10-20%    LONG TERM GOALS (3-4 wks)  Pain continued reductions by 20% 2-4/10 or better  Imp stand cari by additional 20%     PATIENT GOALS  To have less LBP and RTLE pajn  Imp standing cari    REHAB POTENTIAL:  F    RECOMMENDED TREATMENT PLAN TO ACHIEVE THE MUTUAL LONG TERM GOALS:  Progressive ROM, core strengthening, LE MMT, modalities, inst HEP    FREQUENCY/DURATION:  2x/week x 2-4 wks    Thank you for the opportunity to work with your patient. If you have questions or comments, please feel free to contact me by phone or fax.       Electronically Signed by Darby Aparicio PT  10/23/2023        ___________________________________  10/23/2023    Physician     Date

## 2023-10-23 NOTE — PROGRESS NOTES
[address]                Phone: [phone]   Fax: [phone]    Physical Therapy Daily Treatment Note  Date:  10/23/2023    Patient Name:  Smith Mirnada    :  1965  MRN: [id number]    Restrictions/Precautions:  General  Diagnosis:  L.  Spinal stenosis  Insurance/Certification information:    Referring Physician:  Dr Robin Adame MD  Plan of care signed (Y/N):    Visit# / total visits:  -  Pain level: /10   Time In:  Time Out:    Subjective:      Exercises:  Exercise/Equipment Resistance/Repetitions Other comments                                                                                                                                             Other Therapeutic Activities:      Home Exercise Program:      Manual Treatments:      Modalities:      Treatment/Activity Tolerance:  [] Patient tolerated treatment well [] Patient limited by fatique  [] Patient limited by pain  [] Patient limited by other medical complications  [] Other:       Plan:   [] Continue per plan of care [] Alter current plan (see comments)  [] Plan of care initiated [] Hold pending MD visit [] Discharge       Electronically signed by:  Allison Renae PT

## 2023-10-25 ENCOUNTER — HOSPITAL ENCOUNTER (OUTPATIENT)
Dept: PHYSICAL THERAPY | Age: 58
Setting detail: THERAPIES SERIES
Discharge: HOME | End: 2023-10-25
Payer: COMMERCIAL

## 2023-10-25 PROCEDURE — 97110 THERAPEUTIC EXERCISES: CPT

## 2023-10-25 PROCEDURE — 97530 THERAPEUTIC ACTIVITIES: CPT

## 2023-10-25 NOTE — PROGRESS NOTES
[address]                Phone: [phone]   Fax: [phone]    Physical Therapy Daily Treatment Note  Date:  10/25/2023    Patient Name:  Miah Moctezuma    :  1965  MRN: [id number]    Restrictions/Precautions:  General  Diagnosis:  L.  Spinal stenosis  Insurance/Certification information:    Referring Physician:  Dr Blake Goode MD  Plan of care signed (Y/N):    Visit# / total visits:  -  Pain level: /10   Time In: 9:26  Time Out: 10:21    Subjective:      Exercises:  Exercise/Equipment Resistance/Repetitions Other comments   Onestep   10'    Sitting P. Ball trunk flex  X 15 @ 10 sec holds    LTR   10 x ea way with 5 sec holds    Supine hip abd with TB  BL @ 3 x 10     Supine balls squeezes  X 30 with 5 sec holds    B/L seated hip flex   20X    LAQ B/L    20 x           Box step ups    6\" 40 x (up LT/down RT x 20 the up RT/down LT x 20)    Stand hip abd B/L                                                                            Other Therapeutic Activities:  NA    Home Exercise Program:  Provided and reviewed    Manual Treatments: NA     Modalities:      Treatment/Activity Tolerance:  [] Patient tolerated treatment well [x] Patient limited by fatique  [x] Patient limited by pain  [] Patient limited by other medical complications  [] Other:       Plan:   [x] Continue per plan of care [] Alter current plan (see comments)  [] Plan of care initiated [] Hold pending MD visit [] Discharge       Electronically signed by:  Lubna Clay PT

## 2023-10-27 ENCOUNTER — HOSPITAL ENCOUNTER (OUTPATIENT)
Dept: PHYSICAL THERAPY | Age: 58
Setting detail: THERAPIES SERIES
Discharge: HOME | End: 2023-10-27
Payer: COMMERCIAL

## 2023-10-27 PROCEDURE — 97110 THERAPEUTIC EXERCISES: CPT

## 2023-10-27 PROCEDURE — 97530 THERAPEUTIC ACTIVITIES: CPT

## 2023-10-27 NOTE — PROGRESS NOTES
[address]                Phone: [phone]   Fax: [phone]    Physical Therapy Daily Treatment Note  Date:  10/27/2023    Patient Name:  Mignon Garcia    :  1965  MRN: [id number]    Restrictions/Precautions:  General  Diagnosis:  L. Spinal stenosis  Insurance/Certification information:    Referring Physician:  Dr. Radha Pillai MD  Plan of care signed (Y/N):    Visit# / total visits:  3/6-  Pain level: /10   Time In: 9:30  Time Out: 10:25    Subjective:  Reporting was sore day @ last session but better today; still sore but ok. No inc LBP following Tx's. Amb with SC outside home. Reports LBP is getting better but still there. O: There-ex per grid  Amb with SC indep but slower cycle. Amb with SC 2/2 LT knee pain and LBP as well as some instability infreq. Obesity with global hip/trunk ROM soft tissue and flexibility loss limitations. A/P: Reporting inc LT knee pain with supine knee flexed positions; LT knee flex 10-20* < RT knee; had meniscal SX LT knee 12 yrs ago. Stiffness with B/L trunk rotations with RT rotation > LT. Use of SC AAT even in home setting as LT knee. Low back pain as well as infreq. instability makes me use it and family argues if I done use it. Exercises:  Exercise/Equipment Resistance/Repetitions Other comments   Onestep   10' No diff on/off bike, no c/o pain, no dyspnea   Sitting [place] P. Ball trunk flex  X 15 @ 10 sec holds Great seated trunk flexion ROM, Obesity with abdominal soft tissue to anterior thigh contact/ROM limits, no c/o inc pain.       LTR supine  20 x ea way with 5 sec holds Dec B/L LE/trunk rotations with RT rotation 25% better than LT, gets about 20* rotations 2/2 obesity/flexibility loss, LT knee flexes to table 10-20*< than RT knee, Pain LT knee    Supine hip abd with TB  BL @ 4 x 10  Symmetrical hip abd/knee separations, no inc pain    Supine balls squeezes  X 30 with 5 sec holds Good squeezes and 5 sec

## 2023-11-01 ENCOUNTER — HOSPITAL ENCOUNTER (OUTPATIENT)
Dept: PHYSICAL THERAPY | Age: 58
Setting detail: THERAPIES SERIES
Discharge: HOME | End: 2023-11-01
Payer: COMMERCIAL

## 2023-11-01 PROCEDURE — 97110 THERAPEUTIC EXERCISES: CPT

## 2023-11-01 PROCEDURE — 97530 THERAPEUTIC ACTIVITIES: CPT

## 2023-11-01 NOTE — PROGRESS NOTES
[address]                Phone: [phone]   Fax: [phone]    Physical Therapy Daily Treatment Note  Date:  2023    Patient Name:  Nay Clarke    :  1965  MRN: [id number]    Restrictions/Precautions:  General  Diagnosis:  L. Spinal stenosis  Insurance/Certification information:    Referring Physician:  Dr. Robin Owusu MD  Plan of care signed (Y/N):    Visit# / total visits:  -  Pain level: /10   Time In: 9:32  Time Out: 10:29    Subjective:  Reporting was sore day @ last session but better today; still sore but ok. No inc LBP following Tx's. Amb with SC outside home. Reports LBP is getting better but still there. O: There-ex per grid  Amb with SC indep but slower cycle. Amb with SC 2/2 LT knee pain and LBP as well as some instability infreq. Obesity with global hip/trunk ROM soft tissue and flexibility loss limitations. A/P: Reporting inc LT knee pain with supine knee flexed positions; LT knee flex 10-20* < RT knee; had meniscal SX LT knee 12 yrs ago. Stiffness with B/L trunk rotations with RT rotation > LT. Use of SC AAT even in home setting as LT knee. Low back pain as well as infreq. instability makes me use it and family argues if I done use it. Exercises:  Exercise/Equipment Resistance/Repetitions Other comments   Onestep   10' No diff on/off bike, no c/o pain, no dyspnea   Sitting [place] P. Ball trunk flex  X 15 @ 10 sec holds Great seated trunk flexion ROM, Obesity with abdominal soft tissue to anterior thigh contact/ROM limits, no c/o inc pain.       LTR supine  20 x ea way with 5 sec holds Dec B/L LE/trunk rotations with RT rotation 25% better than LT, gets about 20* rotations 2/2 obesity/flexibility loss, LT knee flexes to table 10-20*< than RT knee, Pain LT knee    Supine hip abd with TB  BL @ 4 x 10  Symmetrical hip abd/knee separations, no inc pain    Supine balls squeezes  X 30 with 5 sec holds Good squeezes and 5 sec

## 2023-11-03 ENCOUNTER — HOSPITAL ENCOUNTER (OUTPATIENT)
Dept: PHYSICAL THERAPY | Age: 58
Setting detail: THERAPIES SERIES
Discharge: HOME | End: 2023-11-03
Payer: COMMERCIAL

## 2023-11-03 NOTE — PROGRESS NOTES
[address]                Phone: [phone]  Fax: [phone]    Physical Therapy  Cancellation/No-show Note  Patient Name:  Kristan Coles  :  1965   Date:  11/3/2023    For today's appointment patient:  []  Cancelled  []  Rescheduled appointment  [x]  No-show     Reason given by patient:  []  Patient ill  []  Conflicting appointment  []  No transportation    []  Conflict with work  []  No reason given  []  Other:     Comments:      Electronically signed by:  Alicia Nunez PT r/o medication reaction

## 2023-11-06 ENCOUNTER — TELEPHONE (OUTPATIENT)
Dept: ADMINISTRATIVE | Age: 58
End: 2023-11-06

## 2023-11-06 NOTE — TELEPHONE ENCOUNTER
Pt called, the pharmacy was to have sent over a form for Dr Krysta Garrido to fill out, advising the insurance carrier why she is on Entresto 97/103 mg. Please advise when form is received & sent back; call her at [phone]

## 2023-11-08 ENCOUNTER — HOSPITAL ENCOUNTER (OUTPATIENT)
Dept: PHYSICAL THERAPY | Age: 58
Setting detail: THERAPIES SERIES
Discharge: HOME | End: 2023-11-08
Payer: COMMERCIAL

## 2023-11-08 PROCEDURE — 97530 THERAPEUTIC ACTIVITIES: CPT

## 2023-11-08 PROCEDURE — 97110 THERAPEUTIC EXERCISES: CPT

## 2023-11-08 NOTE — PROGRESS NOTES
holds, no c/o inc pain in LT knee or low back. B/L seated hip flex   30x Slight dec LT hip flex vs RT with some c/o LT hip pain on occas. B/L flexion at hip still markedly limited to approx 4\" off seat/table surface.  Dec AROM 2/2 MMT and obesity/mass LE's    LAQ B/L    3# B/L @ 3 x 10  Symmetrical knee ext    Side lie hip clam shell B/L       Box step ups    6\" 40 x (up LT/down RT x 20 the up RT/down LT x 20) No circum to advance up, no inc LBP, good balances   Stand hip abd B/L      3 x 10 B/L     Stand B/L hip flex    3 x 10 B/L     Chair taps/squats in //                                                          Other Therapeutic Activities:  NA    Home Exercise Program:  Provided and reviewed    Manual Treatments: NA     Modalities:      Treatment/Activity Tolerance:  [] Patient tolerated treatment well [x] Patient limited by fatique  [x] Patient limited by pain  [] Patient limited by other medical complications  [] Other:       Plan:   [x] Continue per plan of care [] Alter current plan (see comments)  [] Plan of care initiated [] Hold pending MD visit [] Discharge       Electronically signed by:  Agustín Haque PT

## 2023-11-10 ENCOUNTER — HOSPITAL ENCOUNTER (OUTPATIENT)
Dept: PHYSICAL THERAPY | Age: 58
Setting detail: THERAPIES SERIES
Discharge: HOME | End: 2023-11-10
Payer: COMMERCIAL

## 2023-11-10 PROCEDURE — 97110 THERAPEUTIC EXERCISES: CPT

## 2023-11-10 PROCEDURE — 97530 THERAPEUTIC ACTIVITIES: CPT

## 2023-11-10 NOTE — PROGRESS NOTES
[address]                Phone: [phone]   Fax: [phone]    Physical Therapy Daily Treatment Note  Date:  11/10/2023    Patient Name:  Luis Cobian    :  1965  MRN: [id number]    Restrictions/Precautions:  General  Diagnosis:  L. Spinal stenosis  Insurance/Certification information:    Referring Physician:  Dr. Angeline Saxena MD  Plan of care signed (Y/N):    Visit# / total visits:  -  Pain level: /10   Time In: 9:34  Time Out: 10:30    Subjective:  Reporting was sore day @ last session but better today; still sore but ok. No inc LBP following Tx's. Amb with SC outside home. Reports LBP is getting better but still there. O: There-ex per grid  Amb with SC indep but slower cycle. Amb with SC 2/2 LT knee pain and LBP as well as some instability infreq. Obesity with global hip/trunk ROM soft tissue and flexibility loss limitations. A/P: Reporting inc LT knee pain with supine knee flexed positions; LT knee flex 10-20* < RT knee; had meniscal SX LT knee 12 yrs ago. Stiffness with B/L trunk rotations with RT rotation > LT. Use of SC AAT even in home setting as LT knee. Low back pain as well as infreq. instability makes me use it and family argues if I done use it. Exercises:  Exercise/Equipment Resistance/Repetitions Other comments   Onestep   10' No diff on/off bike, no c/o pain, no dyspnea   Sitting [place] P. Ball trunk flex  X 15 @ 10 sec holds Great seated trunk flexion ROM, Obesity with abdominal soft tissue to anterior thigh contact/ROM limits, no c/o inc pain. LTR supine  30 x ea way with 5 sec holds Symmetrical LTR    Supine hip abd with TB  BL @ 4 x 10  Symmetrical hip abd/knee separations, no inc pain    Supine balls squeezes  X 30 with 5 sec holds Good squeezes and 5 sec holds, no c/o inc pain in LT knee or low back.     B/L seated hip flex   30x with 3# CW ankles Symmetrical hip flexions with 3# CW   LAQ B/L    3# B/L @ 4 x 10

## 2023-11-15 ENCOUNTER — HOSPITAL ENCOUNTER (OUTPATIENT)
Dept: PHYSICAL THERAPY | Age: 58
Setting detail: THERAPIES SERIES
Discharge: HOME | End: 2023-11-15
Payer: COMMERCIAL

## 2023-11-15 PROCEDURE — 97530 THERAPEUTIC ACTIVITIES: CPT

## 2023-11-15 PROCEDURE — 97110 THERAPEUTIC EXERCISES: CPT

## 2023-11-15 NOTE — PROGRESS NOTES
[address]                Phone: [phone]   Fax: [phone]    Physical Therapy Daily Treatment Note  Date:  11/15/2023    Patient Name:  Cesar Hope    :  1965  MRN: [id number]    Restrictions/Precautions:  General  Diagnosis:  L. Spinal stenosis  Insurance/Certification information:    Referring Physician:  Dr. Maggie Gibbs MD  Plan of care signed (Y/N):    Visit# / total visits:  -  Pain level: /10   Time In: 9:30  Time Out: 10:33    Subjective:  Reporting was sore day @ last session but better today; still sore but ok. No inc LBP following Tx's. Amb with SC outside home. Reports LBP is getting better but still there. O: There-ex per grid  Amb with SC indep but slower cycle. Amb with SC 2/2 LT knee pain and LBP as well as some instability infreq. Obesity with global hip/trunk ROM soft tissue and flexibility loss limitations. A/P: Reporting inc LT knee pain with supine knee flexed positions; LT knee flex 10-20* < RT knee; had meniscal SX LT knee 12 yrs ago. Stiffness with B/L trunk rotations with RT rotation > LT. Use of SC AAT even in home setting as LT knee. Low back pain as well as infreq. instability makes me use it and family argues if I done use it. Exercises:  Exercise/Equipment Resistance/Repetitions Other comments   Onestep   10' No diff on/off bike, no c/o pain, no dyspnea   Sitting [place] P. Ball trunk flex  X 15 @ 10 sec holds Great seated trunk flexion ROM, Obesity with abdominal soft tissue to anterior thigh contact/ROM limits, no c/o inc pain. LTR supine  30 x ea way with 5 sec holds Symmetrical LTR    Supine hip abd with TB  Purple @ 4 x 10  Symmetrical hip abd/knee separations, no inc pain    Supine balls squeezes  X 30 with 5 sec holds Good squeezes and 5 sec holds, no c/o inc pain in LT knee or low back.     B/L seated hip flex   40x with 3# CW ankles Symmetrical hip flexions with 3# CW   LAQ B/L    3# B/L @ 4 x 10

## 2023-11-17 ENCOUNTER — HOSPITAL ENCOUNTER (OUTPATIENT)
Dept: PHYSICAL THERAPY | Age: 58
Setting detail: THERAPIES SERIES
Discharge: HOME | End: 2023-11-17
Payer: COMMERCIAL

## 2023-11-29 ENCOUNTER — HOSPITAL ENCOUNTER (OUTPATIENT)
Dept: PHYSICAL THERAPY | Age: 58
Setting detail: THERAPIES SERIES
Discharge: HOME | End: 2023-11-29
Payer: COMMERCIAL

## 2023-11-29 ENCOUNTER — HOSPITAL ENCOUNTER (OUTPATIENT)
Dept: OTHER | Age: 58
Setting detail: THERAPIES SERIES
Discharge: HOME | End: 2023-11-29
Payer: COMMERCIAL

## 2023-11-29 VITALS
HEART RATE: 74 BPM | RESPIRATION RATE: 18 BRPM | DIASTOLIC BLOOD PRESSURE: 62 MMHG | SYSTOLIC BLOOD PRESSURE: 129 MMHG | OXYGEN SATURATION: 96 % | WEIGHT: 290 LBS | BODY MASS INDEX: 48.26 KG/M2

## 2023-11-29 LAB
ANION GAP SERPL CALCULATED.3IONS-SCNC: 11 MMOL/L (ref 7–16)
BNP SERPL-MCNC: 42 PG/ML (ref 0–125)
BUN SERPL-MCNC: 18 MG/DL (ref 6–23)
CALCIUM SERPL-MCNC: 9.2 MG/DL (ref 8.6–10.2)
CHLORIDE SERPL-SCNC: 110 MMOL/L (ref 98–107)
CO2 SERPL-SCNC: 23 MMOL/L (ref 22–29)
CREAT SERPL-MCNC: 1 MG/DL (ref 0.5–1)
GFR SERPL CREATININE-BSD FRML MDRD: >60 ML/MIN/1.73M2
GLUCOSE SERPL-MCNC: 99 MG/DL (ref 74–99)
POTASSIUM SERPL-SCNC: 4 MMOL/L (ref 3.5–5)
SODIUM SERPL-SCNC: 144 MMOL/L (ref 132–146)

## 2023-11-29 PROCEDURE — 99214 OFFICE O/P EST MOD 30 MIN: CPT

## 2023-11-29 PROCEDURE — 83880 ASSAY OF NATRIURETIC PEPTIDE: CPT

## 2023-11-29 PROCEDURE — 80048 BASIC METABOLIC PNL TOTAL CA: CPT

## 2023-11-29 ASSESSMENT — PATIENT HEALTH QUESTIONNAIRE - PHQ9
1. LITTLE INTEREST OR PLEASURE IN DOING THINGS: SEVERAL DAYS
2. FEELING DOWN, DEPRESSED OR HOPELESS: SEVERAL DAYS
10. IF YOU CHECKED OFF ANY PROBLEMS, HOW DIFFICULT HAVE THESE PROBLEMS MADE IT FOR YOU TO DO YOUR WORK, TAKE CARE OF THINGS AT HOME, OR GET ALONG WITH OTHER PEOPLE: NOT DIFFICULT AT ALL
SUM OF ALL RESPONSES TO PHQ9 QUESTIONS 1 & 2: 2

## 2023-11-29 NOTE — PROGRESS NOTES
BNP)  No  Potassium binders (2b indication for hyperkalemia while taking RAASi)  No        G    HEART FAILURE FOCUSED PHYSICAL EXAMINATION:    Vitals:    11/29/23 0830   BP: 129/62   Pulse: 74   Resp: 18   SpO2: 96%        Assessment  Charting Type: Shift assessment           Respiratory  L Breath Sounds: Clear, Diminished  R Breath Sounds: Clear, Diminished  Breath Sounds  Right Upper Lobe: Clear  Right Middle Lobe: Clear  Right Lower Lobe: Diminished  Left Upper Lobe: Clear  Left Lower Lobe: Diminished     Cardiac  Cardiac Regularity: Regular  Heart Sounds: S1, S2  Cardiac Rhythm: Sinus rhythm  Rhythm Interpretation  Pulse: 74     Gastrointestinal  Abdominal (WDL): Within Defined Limits         Peripheral Vascular  Peripheral Vascular (WDL): Within Defined Limits  Edema: Right lower extremity, Left lower extremity  RLE Edema: Trace, Non-pitting  LLE Edema: Trace, Non-pitting           Genitourinary  Genitourinary (WDL): Within Defined Limits  Psychosocial  Psychosocial (WDL): Within Defined Limits              Pulse: 74               LAB DATA:  BMP:  Sodium (mmol/L)   Date Value   11/29/2023 144   07/07/2023 143   06/09/2023 142     Potassium (mmol/L)   Date Value   11/29/2023 4.0   07/07/2023 3.9   06/09/2023 4.2     Potassium reflex Magnesium (mmol/L)   Date Value   02/03/2023 4.1   10/30/2022 3.8     Chloride (mmol/L)   Date Value   11/29/2023 110 (H)   07/07/2023 110 (H)   06/09/2023 109 (H)     CO2 (mmol/L)   Date Value   11/29/2023 23   07/07/2023 22   06/09/2023 24     BUN (mg/dL)   Date Value   11/29/2023 18   07/07/2023 19   06/09/2023 20     Creatinine (mg/dL)   Date Value   11/29/2023 1.0   07/07/2023 1.0   06/09/2023 1.0     Glucose (mg/dL)   Date Value   11/29/2023 99   07/07/2023 95   06/09/2023 100 (H)   03/09/2012 91   10/05/2010 89     Calcium (mg/dL)   Date Value   11/29/2023 9.2   07/07/2023 9.4   06/09/2023 9.1     BNP:  Pro-BNP (pg/mL)   Date Value   11/29/2023 42   07/07/2023 107   06/09/2023

## 2023-11-29 NOTE — PLAN OF CARE
Problem: Chronic Conditions and Co-morbidities  Goal: Patient's chronic conditions and co-morbidity symptoms are monitored and maintained or improved  Flowsheets (Taken 11/29/2023 4419)  Care Plan - Patient's Chronic Conditions and Co-Morbidity Symptoms are Monitored and Maintained or Improved: Monitor and assess patient's chronic conditions and comorbid symptoms for stability, deterioration, or improvement

## 2023-11-29 NOTE — PROGRESS NOTES
[address]                Phone: [phone]  Fax: [phone]    Physical Therapy  Cancellation/No-show Note  Patient Name:  Kendrick Knott  :  1965   Date:  2023    For today's appointment patient:  []  Cancelled  []  Rescheduled appointment  [x]  No-show     Reason given by patient:  []  Patient ill  []  Conflicting appointment  []  No transportation    []  Conflict with work  []  No reason given  []  Other:     Comments:      Electronically signed by:  Marlene Alfonso PT

## 2023-12-01 ENCOUNTER — HOSPITAL ENCOUNTER (OUTPATIENT)
Dept: PHYSICAL THERAPY | Age: 58
Setting detail: THERAPIES SERIES
Discharge: HOME | End: 2023-12-01

## 2023-12-01 NOTE — PROGRESS NOTES
[address]                Phone: [phone]  Fax: [phone]    Physical Therapy  Cancellation/No-show Note  Patient Name:  Ki Rosenberg  :  1965   Date:  2023    For today's appointment patient:  []  Cancelled  []  Rescheduled appointment  [x]  No-show     Reason given by patient:  []  Patient ill  []  Conflicting appointment  []  No transportation    []  Conflict with work  []  No reason given  []  Other:     Comments:  D/C today    Electronically signed by:  Heather Ortiz PT

## 2023-12-05 ENCOUNTER — TELEPHONE (OUTPATIENT)
Dept: BARIATRICS/WEIGHT MGMT | Age: 58
End: 2023-12-05

## 2023-12-14 ENCOUNTER — TELEPHONE (OUTPATIENT)
Dept: BARIATRICS/WEIGHT MGMT | Age: 58
End: 2023-12-14

## 2023-12-28 ENCOUNTER — HOSPITAL ENCOUNTER (OUTPATIENT)
Dept: PHYSICAL THERAPY | Age: 58
Setting detail: THERAPIES SERIES
Discharge: HOME | End: 2023-12-28

## 2023-12-28 NOTE — PROGRESS NOTES
[address]                Phone: [phone]   Fax: [phone]    Physical Therapy  Out Patient Discharge Summary     Date:  2023    Patient Name:  Nagi Schmidt    :  1965  MRN: [id number]    DIAGNOSIS:  Spinal stenosis lumbar   REFERRING PHYSICIAN:  Dr Destiney Douglas MD     ATTENDANCE:  Pt has attended 6 of 11 scheduled treatments   TREATMENTS RECEIVED:  Progressive ROM, core strengthening, LE MMT, modalities, inst HEP     INITIAL STATUS:  Pain low back /10 with radicular RTLE pain to ankle levels  Decreased pain free lumbar ROM 2/2 pain, soft tissue restrictions. Lumbar flex 50*, Ext +15*, RT/LT SB 15*  Functional ROM limitations 2/2 obesity BLE/hips/trunk   MMT impairment core 2/5 supine, B/L hips 3-3+/5  Dec static and dynamic stand cari 2/2 LBP and RTLE pain limited <5'     FINAL STATUS:  Imp pain 50% low back   Imp pain free lumbar/trunk ROM all planes   Imp MMt BLE's 4-5/5, core remains 2/5  Indep HEP    GOALS:  3 out of 5 Long Term Goals were obtained. LONG TERM GOALS NOT OBTAINED/REASON: Obesity, pain tolerances,     PATIENT GOALS: Some met    REASON FOR DISCHARGE: NS status @ 6th visit. MMI and plan was to D/C     PATIENT EDUCATION/INSTRUCTIONS: Provided and reviewed2    RECOMMENDATIONS: D/C         Thank you for the opportunity to work with your patient. If you have questions or comments, please feel free to contact me by phone or fax.       Electronically Signed by: Percy Cancino PT  2023

## 2024-01-10 ENCOUNTER — TELEPHONE (OUTPATIENT)
Dept: BARIATRICS/WEIGHT MGMT | Age: 59
End: 2024-01-10

## 2024-01-29 ENCOUNTER — OFFICE VISIT (OUTPATIENT)
Dept: INTERNAL MEDICINE | Age: 59
End: 2024-01-29
Payer: COMMERCIAL

## 2024-01-29 VITALS
WEIGHT: 287.4 LBS | HEART RATE: 67 BPM | HEIGHT: 65 IN | DIASTOLIC BLOOD PRESSURE: 60 MMHG | SYSTOLIC BLOOD PRESSURE: 124 MMHG | RESPIRATION RATE: 18 BRPM | OXYGEN SATURATION: 96 % | BODY MASS INDEX: 47.88 KG/M2 | TEMPERATURE: 97.4 F

## 2024-01-29 DIAGNOSIS — I10 PRIMARY HYPERTENSION: ICD-10-CM

## 2024-01-29 DIAGNOSIS — Z79.899 NEW MEDICATION ADDED: ICD-10-CM

## 2024-01-29 DIAGNOSIS — G62.9 NEUROPATHY: ICD-10-CM

## 2024-01-29 DIAGNOSIS — J45.20 MILD INTERMITTENT ASTHMA WITHOUT COMPLICATION: ICD-10-CM

## 2024-01-29 DIAGNOSIS — M85.851 OSTEOPENIA OF RIGHT HIP: ICD-10-CM

## 2024-01-29 DIAGNOSIS — J45.909 UNCOMPLICATED ASTHMA, UNSPECIFIED ASTHMA SEVERITY, UNSPECIFIED WHETHER PERSISTENT: ICD-10-CM

## 2024-01-29 DIAGNOSIS — E55.9 VITAMIN D DEFICIENCY: ICD-10-CM

## 2024-01-29 DIAGNOSIS — M79.644 PAIN OF RIGHT THUMB: Primary | ICD-10-CM

## 2024-01-29 DIAGNOSIS — M25.569 KNEE PAIN, UNSPECIFIED CHRONICITY, UNSPECIFIED LATERALITY: ICD-10-CM

## 2024-01-29 DIAGNOSIS — I50.20 SYSTOLIC CONGESTIVE HEART FAILURE, UNSPECIFIED HF CHRONICITY (HCC): ICD-10-CM

## 2024-01-29 DIAGNOSIS — R09.81 NASAL CONGESTION: ICD-10-CM

## 2024-01-29 DIAGNOSIS — J30.9 ALLERGIC RHINITIS, UNSPECIFIED SEASONALITY, UNSPECIFIED TRIGGER: ICD-10-CM

## 2024-01-29 DIAGNOSIS — I50.43 CHF (CONGESTIVE HEART FAILURE), NYHA CLASS I, ACUTE ON CHRONIC, COMBINED (HCC): ICD-10-CM

## 2024-01-29 DIAGNOSIS — E78.5 HYPERLIPIDEMIA, UNSPECIFIED HYPERLIPIDEMIA TYPE: ICD-10-CM

## 2024-01-29 PROCEDURE — 99214 OFFICE O/P EST MOD 30 MIN: CPT | Performed by: INTERNAL MEDICINE

## 2024-01-29 PROCEDURE — 3078F DIAST BP <80 MM HG: CPT | Performed by: INTERNAL MEDICINE

## 2024-01-29 PROCEDURE — 3074F SYST BP LT 130 MM HG: CPT | Performed by: INTERNAL MEDICINE

## 2024-01-29 PROCEDURE — 99212 OFFICE O/P EST SF 10 MIN: CPT | Performed by: INTERNAL MEDICINE

## 2024-01-29 RX ORDER — GABAPENTIN 100 MG/1
100 CAPSULE ORAL NIGHTLY
Qty: 180 CAPSULE | Refills: 1 | Status: SHIPPED | OUTPATIENT
Start: 2024-01-29 | End: 2025-01-23

## 2024-01-29 RX ORDER — ASPIRIN 81 MG/1
81 TABLET, CHEWABLE ORAL DAILY
Qty: 90 TABLET | Refills: 1 | Status: SHIPPED | OUTPATIENT
Start: 2024-01-29

## 2024-01-29 RX ORDER — FUROSEMIDE 20 MG/1
20 TABLET ORAL DAILY
Qty: 90 TABLET | Refills: 1 | Status: SHIPPED | OUTPATIENT
Start: 2024-01-29

## 2024-01-29 RX ORDER — PHENOL 1.4 %
1 AEROSOL, SPRAY (ML) MUCOUS MEMBRANE DAILY
Qty: 90 TABLET | Refills: 1 | Status: SHIPPED | OUTPATIENT
Start: 2024-01-29

## 2024-01-29 RX ORDER — ERGOCALCIFEROL 1.25 MG/1
50000 CAPSULE ORAL WEEKLY
Qty: 12 CAPSULE | Refills: 1 | Status: SHIPPED | OUTPATIENT
Start: 2024-01-29

## 2024-01-29 RX ORDER — PHENOL 1.4 %
1 AEROSOL, SPRAY (ML) MUCOUS MEMBRANE 2 TIMES DAILY
Qty: 180 TABLET | Refills: 1 | Status: SHIPPED | OUTPATIENT
Start: 2024-01-29

## 2024-01-29 RX ORDER — FLUTICASONE PROPIONATE 50 MCG
1 SPRAY, SUSPENSION (ML) NASAL DAILY
Qty: 16 G | Refills: 5 | Status: SHIPPED | OUTPATIENT
Start: 2024-01-29

## 2024-01-29 RX ORDER — AMLODIPINE BESYLATE 5 MG/1
5 TABLET ORAL DAILY
Qty: 90 TABLET | Refills: 1 | Status: SHIPPED | OUTPATIENT
Start: 2024-01-29

## 2024-01-29 RX ORDER — CETIRIZINE HYDROCHLORIDE 10 MG/1
10 TABLET ORAL DAILY PRN
Qty: 90 TABLET | Refills: 1 | Status: SHIPPED | OUTPATIENT
Start: 2024-01-29

## 2024-01-29 RX ORDER — CARVEDILOL 25 MG/1
25 TABLET ORAL 2 TIMES DAILY WITH MEALS
Qty: 180 TABLET | Refills: 1 | Status: SHIPPED | OUTPATIENT
Start: 2024-01-29

## 2024-01-29 RX ORDER — ROSUVASTATIN CALCIUM 40 MG/1
40 TABLET, COATED ORAL NIGHTLY
Qty: 90 TABLET | Refills: 1 | Status: SHIPPED | OUTPATIENT
Start: 2024-01-29

## 2024-01-29 RX ORDER — MELATONIN
1000 DAILY
Qty: 90 TABLET | Refills: 1 | Status: SHIPPED | OUTPATIENT
Start: 2024-01-29

## 2024-01-29 RX ORDER — ALBUTEROL SULFATE 90 UG/1
2 AEROSOL, METERED RESPIRATORY (INHALATION) EVERY 6 HOURS PRN
Qty: 1 EACH | Refills: 6 | Status: SHIPPED | OUTPATIENT
Start: 2024-01-29

## 2024-01-29 RX ORDER — SPIRONOLACTONE 25 MG/1
25 TABLET ORAL DAILY
Qty: 90 TABLET | Refills: 1 | Status: SHIPPED | OUTPATIENT
Start: 2024-01-29

## 2024-01-29 SDOH — ECONOMIC STABILITY: TRANSPORTATION INSECURITY
IN THE PAST 12 MONTHS, HAS LACK OF TRANSPORTATION KEPT YOU FROM MEETINGS, WORK, OR FROM GETTING THINGS NEEDED FOR DAILY LIVING?: YES

## 2024-01-29 SDOH — ECONOMIC STABILITY: INCOME INSECURITY: HOW HARD IS IT FOR YOU TO PAY FOR THE VERY BASICS LIKE FOOD, HOUSING, MEDICAL CARE, AND HEATING?: HARD

## 2024-01-29 SDOH — ECONOMIC STABILITY: FOOD INSECURITY: WITHIN THE PAST 12 MONTHS, YOU WORRIED THAT YOUR FOOD WOULD RUN OUT BEFORE YOU GOT MONEY TO BUY MORE.: SOMETIMES TRUE

## 2024-01-29 SDOH — ECONOMIC STABILITY: FOOD INSECURITY: WITHIN THE PAST 12 MONTHS, THE FOOD YOU BOUGHT JUST DIDN'T LAST AND YOU DIDN'T HAVE MONEY TO GET MORE.: SOMETIMES TRUE

## 2024-01-29 ASSESSMENT — PATIENT HEALTH QUESTIONNAIRE - PHQ9
SUM OF ALL RESPONSES TO PHQ QUESTIONS 1-9: 6
3. TROUBLE FALLING OR STAYING ASLEEP: 2
1. LITTLE INTEREST OR PLEASURE IN DOING THINGS: 1
9. THOUGHTS THAT YOU WOULD BE BETTER OFF DEAD, OR OF HURTING YOURSELF: 0
5. POOR APPETITE OR OVEREATING: 1
2. FEELING DOWN, DEPRESSED OR HOPELESS: 1
SUM OF ALL RESPONSES TO PHQ9 QUESTIONS 1 & 2: 2
8. MOVING OR SPEAKING SO SLOWLY THAT OTHER PEOPLE COULD HAVE NOTICED. OR THE OPPOSITE, BEING SO FIGETY OR RESTLESS THAT YOU HAVE BEEN MOVING AROUND A LOT MORE THAN USUAL: 0
SUM OF ALL RESPONSES TO PHQ QUESTIONS 1-9: 6
6. FEELING BAD ABOUT YOURSELF - OR THAT YOU ARE A FAILURE OR HAVE LET YOURSELF OR YOUR FAMILY DOWN: 0
7. TROUBLE CONCENTRATING ON THINGS, SUCH AS READING THE NEWSPAPER OR WATCHING TELEVISION: 0
4. FEELING TIRED OR HAVING LITTLE ENERGY: 1
SUM OF ALL RESPONSES TO PHQ QUESTIONS 1-9: 6
10. IF YOU CHECKED OFF ANY PROBLEMS, HOW DIFFICULT HAVE THESE PROBLEMS MADE IT FOR YOU TO DO YOUR WORK, TAKE CARE OF THINGS AT HOME, OR GET ALONG WITH OTHER PEOPLE: 0
SUM OF ALL RESPONSES TO PHQ QUESTIONS 1-9: 6

## 2024-01-29 ASSESSMENT — ENCOUNTER SYMPTOMS
VOMITING: 0
WHEEZING: 0
COUGH: 0
ABDOMINAL PAIN: 0
SORE THROAT: 0
RHINORRHEA: 0
SHORTNESS OF BREATH: 0
CONSTIPATION: 0
NAUSEA: 0

## 2024-01-29 NOTE — PATIENT INSTRUCTIONS
1 Take your medications regularly.  2. Start taking Vit D 50,000 units once a week and Vit D 1000 units daily.  3. Start taking calcium 600 mg tab twice a day.  4. Continue to follow-up with Dr. Borja.

## 2024-01-29 NOTE — PROGRESS NOTES
Nasra Loyd (:  1965) is a 58 y.o. female,Established patient, here for evaluation of the following chief complaint(s):  Follow-up and Hand Pain (Pt complains of pain and stiffness in right thumb)  Handicap sticker    Last seen in the clinic on 10/16/23     ASSESSMENT/PLAN:  R thumb pain, likely OA  -on and off since the past 2-3 months  -no trauma or fall  Plan  -Xray R hand  -Voltaren gel QID prn    Hx TIA  -23: R sided paresthesia and aphasia  -CT head, CTA head and neck and brain perfusion: negative  -MRI brain at that time was negative as well  -s/p DAPT x 21 days  -currently on aspirin 81 mg daily  Plan  -continue aspirin 81 mg daily    Chronic back pain 2/2 lumbar spinal stenosis  -Discharge from PT 23  -improvement of pain by 50%    Asthma, mild intermittent  -last attack:years ago  -on albuterol inhaler prn  Plan  -continue albuterol prn    HTN  -BP at home: not able to check  BP in the clinic: 124/60 mmHg  -denies orthostasis  -with allergic reaction to isosorbide dinitrate-hydralazine  -on amlodipine 5 mg daily, carvedilol 25 mg BID  -trying   Plan  -continue amlodipine and carvedilol     HLD  -last lipid panel 2/3/23 LDL 77 HDL 66  TG 71  -The 10-year ASCVD risk score (Travon CABALLERO, et al., 2019) is: 5%    Values used to calculate the score:      Age: 58 years      Sex: Female      Is Non- : Yes      Diabetic: No      Tobacco smoker: No      Systolic Blood Pressure: 129 mmHg      Is BP treated: Yes      HDL Cholesterol: 66 mg/dL      Total Cholesterol: 157 mg/dL  -on rosuvastatin 40 mg daily  Plan  -repeat lipid panel      HFmrEF, NYHA Class I  -LHC 22: mild CAD, elevated LVEDP at 25  -last 2D echo 22: EF 40-45%, mildly dilated LV,  mod conc LVH, hypokinetic anterior, anterolateral wall, stage II DD, mild pulm HTN  Echo 10/30/22: EF 45%, NRWMA  -last BMP 23: crea 1 GFR >60 K 4, Na 144  -currently on carvedilol 25 mg BID, Entresto  mg

## 2024-01-29 NOTE — PROGRESS NOTES
[facility]  Internal Medicine Residency Clinic    Attending Physician Statement  I have discussed the case, including pertinent history and exam findings with the resident physician. I was present in the room for the encounter.  I agree with the assessment, plan and orders as documented by the resident. I have reviewed all pertinent PMHx, PSHx, FamHx, SocialHx, medications, and allergies and updated history as appropriate.    Patient here for routine follow up of medical problems.   Complains of R thumb pain - ongoing at 1st MCP joint of the R hand.  This could represent OA of the 1st MCP joint.     Check X-ray of the hand     CHF - Compensated.  Follows with cardiology.    Continue management with carvedilol, lasix, Jardiance, Entresto and spironolactone.     HTN - asymptomatic and well controlled.    Continue carvedilol, amlodipine, lasix, Entresto and spironolactone.     Asthma - controlled.   Continue PRN albuterol.    Osteopenia - start calcium and Vitamin D    Will need a recheck in 2 years.     Remainder of medical problems as per resident note.    Alivia Munoz MD  1/29/2024 9:55 AM

## 2024-03-25 ENCOUNTER — HOSPITAL ENCOUNTER (OUTPATIENT)
Dept: OTHER | Age: 59
Setting detail: THERAPIES SERIES
Discharge: HOME | End: 2024-03-25
Payer: COMMERCIAL

## 2024-03-25 VITALS
BODY MASS INDEX: 48.92 KG/M2 | OXYGEN SATURATION: 96 % | SYSTOLIC BLOOD PRESSURE: 117 MMHG | RESPIRATION RATE: 18 BRPM | HEART RATE: 74 BPM | WEIGHT: 293 LBS | DIASTOLIC BLOOD PRESSURE: 68 MMHG

## 2024-03-25 LAB
ANION GAP SERPL CALCULATED.3IONS-SCNC: 10 MMOL/L (ref 7–16)
BNP SERPL-MCNC: 74 PG/ML (ref 0–125)
BUN SERPL-MCNC: 16 MG/DL (ref 6–23)
CALCIUM SERPL-MCNC: 9.3 MG/DL (ref 8.6–10.2)
CHLORIDE SERPL-SCNC: 107 MMOL/L (ref 98–107)
CO2 SERPL-SCNC: 23 MMOL/L (ref 22–29)
CREAT SERPL-MCNC: 1.2 MG/DL (ref 0.5–1)
GFR SERPL CREATININE-BSD FRML MDRD: 50 ML/MIN/1.73M2
GLUCOSE SERPL-MCNC: 121 MG/DL (ref 74–99)
POTASSIUM SERPL-SCNC: 3.6 MMOL/L (ref 3.5–5)
SODIUM SERPL-SCNC: 140 MMOL/L (ref 132–146)

## 2024-03-25 PROCEDURE — 99214 OFFICE O/P EST MOD 30 MIN: CPT

## 2024-03-25 PROCEDURE — 80048 BASIC METABOLIC PNL TOTAL CA: CPT

## 2024-03-25 PROCEDURE — 83880 ASSAY OF NATRIURETIC PEPTIDE: CPT

## 2024-03-25 ASSESSMENT — PATIENT HEALTH QUESTIONNAIRE - PHQ9
SUM OF ALL RESPONSES TO PHQ9 QUESTIONS 1 & 2: 2
10. IF YOU CHECKED OFF ANY PROBLEMS, HOW DIFFICULT HAVE THESE PROBLEMS MADE IT FOR YOU TO DO YOUR WORK, TAKE CARE OF THINGS AT HOME, OR GET ALONG WITH OTHER PEOPLE: SOMEWHAT DIFFICULT
2. FEELING DOWN, DEPRESSED OR HOPELESS: SEVERAL DAYS
1. LITTLE INTEREST OR PLEASURE IN DOING THINGS: SEVERAL DAYS

## 2024-03-25 NOTE — PLAN OF CARE
Problem: Chronic Conditions and Co-morbidities  Goal: Patient's chronic conditions and co-morbidity symptoms are monitored and maintained or improved  Flowsheets (Taken 3/25/2024 3831)  Care Plan - Patient's Chronic Conditions and Co-Morbidity Symptoms are Monitored and Maintained or Improved: Monitor and assess patient's chronic conditions and comorbid symptoms for stability, deterioration, or improvement

## 2024-03-25 NOTE — PROGRESS NOTES
Date Value   02/03/2023 31.8 (L)     Platelets (E9/L)   Date Value   02/03/2023 194     Iron Studies:  Ferritin (ng/mL)   Date Value   12/02/2022 150     Iron (mcg/dL)   Date Value   12/02/2022 70     TIBC (mcg/dL)   Date Value   12/02/2022 255     Hepatic:  AST (U/L)   Date Value   11/08/2022 17     ALT (U/L)   Date Value   11/08/2022 15     Total Bilirubin (mg/dL)   Date Value   11/08/2022 0.4     Alkaline Phosphatase (U/L)   Date Value   11/08/2022 89     INR:  INR (no units)   Date Value   02/03/2023 1.0         Wt Readings from Last 3 Encounters:   03/25/24 133.4 kg (294 lb)   01/29/24 130.4 kg (287 lb 6.4 oz)   11/29/23 131.5 kg (290 lb)           ASSESSMENT/PLAN:    [x] Euvolemic, NO JVD, DENIES ANY DISCOMFORT.  CONFIRMED WILL CALL FOR EARLIER APPT. IF NEEDED.          [] Hypervolemic, with increase from baseline:  [] Shortness of breath/MOORE  [] JVD  [] HJR  [] Abnormal lung assessment:   [] Orthopnea  [] PND  [] Decreased urinary response to oral diuretic   [] Scrotal swelling   [] Lower extremity edema  [] Compression stockings provided  [] Decline in functional capacity (unable to perform activities they had previously been able to do)  [] Weight gain     [] IV diuretics given No  [] Provider notified of recurrent IV diuretic use    Additional Notes:             [x]Lab work obtained    [x] Patient/Family Educated On:  [x] HF zones (Green, Yellow, Red) and aware of when to take action   [x] Daily weights  [] Scale provided   [x] Low sodium diet (2000 mg)  Barriers to compliance  [] Refuses to monitor diet  [] Socioeconomic difficulties  [] Unable to cook for self (use of frozen meals, can goods, etc)  [] CHF CHW consulted  [] Low sodium meal delivery options given to patient  [] Dietician consulted   [] Low sodium recipes provided  [] Sodium free seasoning provided   [x] Fluid intake 6-8 cups (around 64 oz)  [x] Reviewed currently prescribed medications with patient, educated on importance of compliance

## 2024-03-25 NOTE — FLOWSHEET NOTE
03/25/24 0851   Over the past 2 weeks, how often have you been bothered by any of the following problems?   Little interest or pleasure in doing things Several days   Feeling down, depressed, or hopeless Several days   Patient Health Questionnaire-2 Score 2   If you checked off any problems on this questionnaire so far,   How difficult have these problems made it for you to do your work, take care of things at home, or get along with other people? Somewhat difficult

## 2024-05-13 ENCOUNTER — OFFICE VISIT (OUTPATIENT)
Dept: INTERNAL MEDICINE | Age: 59
End: 2024-05-13
Payer: COMMERCIAL

## 2024-05-13 VITALS
TEMPERATURE: 97.2 F | DIASTOLIC BLOOD PRESSURE: 62 MMHG | OXYGEN SATURATION: 95 % | WEIGHT: 293 LBS | RESPIRATION RATE: 18 BRPM | HEART RATE: 57 BPM | BODY MASS INDEX: 48.82 KG/M2 | SYSTOLIC BLOOD PRESSURE: 129 MMHG | HEIGHT: 65 IN

## 2024-05-13 DIAGNOSIS — I10 PRIMARY HYPERTENSION: ICD-10-CM

## 2024-05-13 DIAGNOSIS — G62.9 NEUROPATHY: ICD-10-CM

## 2024-05-13 DIAGNOSIS — Z23 IMMUNIZATION DUE: ICD-10-CM

## 2024-05-13 DIAGNOSIS — J45.20 MILD INTERMITTENT ASTHMA WITHOUT COMPLICATION: ICD-10-CM

## 2024-05-13 DIAGNOSIS — Z79.899 NEW MEDICATION ADDED: ICD-10-CM

## 2024-05-13 DIAGNOSIS — M25.569 KNEE PAIN, UNSPECIFIED CHRONICITY, UNSPECIFIED LATERALITY: ICD-10-CM

## 2024-05-13 DIAGNOSIS — I50.43 CHF (CONGESTIVE HEART FAILURE), NYHA CLASS I, ACUTE ON CHRONIC, COMBINED (HCC): ICD-10-CM

## 2024-05-13 DIAGNOSIS — Z12.11 ENCOUNTER FOR SCREENING COLONOSCOPY: Primary | ICD-10-CM

## 2024-05-13 DIAGNOSIS — J30.9 ALLERGIC RHINITIS, UNSPECIFIED SEASONALITY, UNSPECIFIED TRIGGER: ICD-10-CM

## 2024-05-13 DIAGNOSIS — R09.81 NASAL CONGESTION: ICD-10-CM

## 2024-05-13 DIAGNOSIS — M85.851 OSTEOPENIA OF RIGHT HIP: ICD-10-CM

## 2024-05-13 DIAGNOSIS — E55.9 VITAMIN D DEFICIENCY: ICD-10-CM

## 2024-05-13 DIAGNOSIS — J45.909 UNCOMPLICATED ASTHMA, UNSPECIFIED ASTHMA SEVERITY, UNSPECIFIED WHETHER PERSISTENT: ICD-10-CM

## 2024-05-13 DIAGNOSIS — I50.20 SYSTOLIC CONGESTIVE HEART FAILURE, UNSPECIFIED HF CHRONICITY (HCC): ICD-10-CM

## 2024-05-13 DIAGNOSIS — E78.5 HYPERLIPIDEMIA, UNSPECIFIED HYPERLIPIDEMIA TYPE: ICD-10-CM

## 2024-05-13 PROCEDURE — 99213 OFFICE O/P EST LOW 20 MIN: CPT

## 2024-05-13 RX ORDER — GABAPENTIN 100 MG/1
100 CAPSULE ORAL NIGHTLY
Qty: 180 CAPSULE | Refills: 1 | Status: SHIPPED | OUTPATIENT
Start: 2024-05-13 | End: 2025-05-08

## 2024-05-13 RX ORDER — ASPIRIN 81 MG/1
81 TABLET, CHEWABLE ORAL DAILY
Qty: 90 TABLET | Refills: 1 | Status: SHIPPED | OUTPATIENT
Start: 2024-05-13

## 2024-05-13 RX ORDER — SPIRONOLACTONE 25 MG/1
25 TABLET ORAL DAILY
Qty: 90 TABLET | Refills: 1 | Status: SHIPPED | OUTPATIENT
Start: 2024-05-13

## 2024-05-13 RX ORDER — AMLODIPINE BESYLATE 5 MG/1
5 TABLET ORAL DAILY
Qty: 90 TABLET | Refills: 1 | Status: SHIPPED | OUTPATIENT
Start: 2024-05-13

## 2024-05-13 RX ORDER — FEXOFENADINE HCL 180 MG/1
180 TABLET ORAL DAILY
Qty: 90 TABLET | Refills: 0 | Status: SHIPPED | OUTPATIENT
Start: 2024-05-13 | End: 2024-08-11

## 2024-05-13 RX ORDER — ZOSTER VACCINE RECOMBINANT, ADJUVANTED 50 MCG/0.5
0.5 KIT INTRAMUSCULAR SEE ADMIN INSTRUCTIONS
Qty: 0.5 ML | Refills: 1 | Status: SHIPPED | OUTPATIENT
Start: 2024-05-13 | End: 2024-05-14

## 2024-05-13 RX ORDER — MELATONIN
1000 DAILY
Qty: 90 TABLET | Refills: 1 | Status: SHIPPED | OUTPATIENT
Start: 2024-05-13

## 2024-05-13 RX ORDER — CETIRIZINE HYDROCHLORIDE 10 MG/1
10 TABLET ORAL DAILY PRN
Qty: 90 TABLET | Refills: 1 | Status: CANCELLED | OUTPATIENT
Start: 2024-05-13

## 2024-05-13 RX ORDER — FUROSEMIDE 20 MG/1
20 TABLET ORAL DAILY
Qty: 90 TABLET | Refills: 1 | Status: SHIPPED | OUTPATIENT
Start: 2024-05-13

## 2024-05-13 RX ORDER — ALBUTEROL SULFATE 90 UG/1
2 AEROSOL, METERED RESPIRATORY (INHALATION) EVERY 6 HOURS PRN
Qty: 1 EACH | Refills: 6 | Status: SHIPPED | OUTPATIENT
Start: 2024-05-13

## 2024-05-13 RX ORDER — ROSUVASTATIN CALCIUM 40 MG/1
40 TABLET, COATED ORAL NIGHTLY
Qty: 90 TABLET | Refills: 1 | Status: SHIPPED | OUTPATIENT
Start: 2024-05-13

## 2024-05-13 RX ORDER — PHENOL 1.4 %
1 AEROSOL, SPRAY (ML) MUCOUS MEMBRANE DAILY
Qty: 90 TABLET | Refills: 1 | Status: SHIPPED | OUTPATIENT
Start: 2024-05-13

## 2024-05-13 RX ORDER — CARVEDILOL 25 MG/1
25 TABLET ORAL 2 TIMES DAILY WITH MEALS
Qty: 180 TABLET | Refills: 1 | Status: SHIPPED | OUTPATIENT
Start: 2024-05-13

## 2024-05-13 RX ORDER — FLUTICASONE PROPIONATE 50 MCG
1 SPRAY, SUSPENSION (ML) NASAL DAILY
Qty: 16 G | Refills: 5 | Status: SHIPPED | OUTPATIENT
Start: 2024-05-13

## 2024-05-13 ASSESSMENT — ENCOUNTER SYMPTOMS
COUGH: 0
CHEST TIGHTNESS: 0
NAUSEA: 0
VOMITING: 0
ABDOMINAL PAIN: 0

## 2024-05-13 NOTE — PATIENT INSTRUCTIONS
Thank you for coming to your follow up appointment   Please take your medications as directed and keep your follow up appointment after 3 months around 8/26/2024.  Call our office if you have any questions or concerns at [phone]  Have blood work done prior to next appointment.  Follow up with CHF clinic and Cardiology as scheduled    Gabriele Lr MD          Learning About Asthma  What is asthma?     Asthma is a lifelong condition that can make it hard to breathe. It causes the airways that lead to the lungs to swell and get inflamed.  Some people have a hard time breathing only at certain times. This may be during allergy season, when they get a cold, or when they exercise. Others have breathing problems a lot of the time.  When asthma symptoms suddenly get worse (or flare up), the airways tighten and become narrower. This makes it hard to breathe, and you may wheeze or cough. These flare-ups are also called asthma attacks or exacerbations (say \"ht-PUP-rq-BAY-shuns\").  Even though asthma is a lifelong condition, treatment can help you feel and breathe better and help keep your lungs healthy.  What are the symptoms of asthma?  When you have asthma, you may:  Wheeze, making a loud or soft whistling noise when you breathe in and out.  Cough a lot. This is the only symptom for some people.  Feel tightness in your chest.  Feel short of breath. You may have rapid, shallow breathing or trouble breathing.  Have trouble sleeping because you're coughing or having a hard time breathing.  Get tired quickly during exercise.  Symptoms may start soon after you're around things (triggers) that cause your asthma attacks. This is an early phase response. Or they may start several hours after exposure (late phase response). A late phase response can make it harder to figure out what triggers your symptoms.  Symptoms can be mild or severe. You may have symptoms daily or just now and then. Or you may have something in

## 2024-05-13 NOTE — PROGRESS NOTES
[facility]  Internal Medicine Residency Clinic    Attending Physician Statement  I have discussed the case, including pertinent history and exam findings with the resident physician.  I agree with the assessment, plan and orders as documented by the resident. I have reviewed all pertinent PMHx, PSHx, FamHx, SocialHx, medications, and allergies and updated history as appropriate.    Patient here for routine follow up of medical problems.   HFrEF - follows with CHF clinic.  Compensated on GDMT.  Has follow-up with Dr. Jonh castro.    Continue same.     R thumb and ring finger trigger fingers - will refer to injection clinic.  X-ray was ordered but has not been completed.    Complete X-rays and await injections.     TIA - (2023)-  currently on ASA.     Continue same     Asthma - only uses albuterol PRN.  Not using frequently.    No change in therapy.     Remainder of medical problems as per resident note.    Alivia Munoz MD  5/13/2024 9:11 AM      
5/13/2024, Disp-2 g, R-2, Normal  10. Systolic congestive heart failure, unspecified HF chronicity (HCC)  -     empagliflozin (JARDIANCE) 10 MG tablet; Take 1 tablet by mouth daily, Disp-90 tablet, R-1Pharmacist:education consult initial med . Indication HF. Apply copay card. If too expensive or does not  notify cardiology [phone] option 2Normal  -     CBC with Auto Differential; Future  -     Basic Metabolic Panel; Future  11. New medication added  -     empagliflozin (JARDIANCE) 10 MG tablet; Take 1 tablet by mouth daily, Disp-90 tablet, R-1Pharmacist:education consult initial med . Indication HF. Apply copay card. If too expensive or does not  notify cardiology [phone] option 2Normal  12. Nasal congestion  -     fluticasone (FLONASE) 50 MCG/ACT nasal spray; 1 spray by Each Nostril route daily, Disp-16 g, R-5Normal  13. Neuropathy  -     gabapentin (NEURONTIN) 100 MG capsule; Take 1 capsule by mouth at bedtime for 360 days., Disp-180 capsule, R-1Normal  14. Hyperlipidemia, unspecified hyperlipidemia type  -     rosuvastatin (CRESTOR) 40 MG tablet; Take 1 tablet by mouth nightly, Disp-90 tablet, R-1Normal  -     CBC with Auto Differential; Future  -     Basic Metabolic Panel; Future  15. Immunization due  -     zoster recombinant adjuvanted vaccine (SHINGRIX) 50 MCG/0.5ML SUSR injection; Inject 0.5 mLs into the muscle See Admin Instructions for 1 day, Disp-0.5 mL, R-1Print       HFmEF, with mild CAD  - ECHO on 11/1/22 showed EF 40 -45%, mild dilated left ventricle, anterior anterolateral wall hypokinetic, stage II left ventricular diastolic dysfunction  -Left heart cath on 12/7/2020 showed mild CAD, elevated LVEDP at 25 and uncontrolled blood pressure  -Regularly following with cardiology, next visit in a month with Dr Borja  -Continue on carvedilol 25 mg twice daily, Entresto  mg twice daily, spironolactone 25 mg daily and Jardiance 10 mg daily  -Continue on aspirin 81

## 2024-06-04 LAB
LEFT VENTRICULAR EJECTION FRACTION HIGH VALUE: 60 %
LEFT VENTRICULAR EJECTION FRACTION MODE: NORMAL
LV EF: 55 %

## 2024-06-10 ENCOUNTER — HOSPITAL ENCOUNTER (OUTPATIENT)
Dept: OTHER | Age: 59
Setting detail: THERAPIES SERIES
Discharge: HOME | End: 2024-06-10
Payer: COMMERCIAL

## 2024-06-10 VITALS
OXYGEN SATURATION: 98 % | WEIGHT: 293 LBS | DIASTOLIC BLOOD PRESSURE: 74 MMHG | HEART RATE: 71 BPM | RESPIRATION RATE: 18 BRPM | BODY MASS INDEX: 49.26 KG/M2 | SYSTOLIC BLOOD PRESSURE: 137 MMHG

## 2024-06-10 LAB
25(OH)D3 SERPL-MCNC: 36.3 NG/ML (ref 30–100)
ANION GAP SERPL CALCULATED.3IONS-SCNC: 10 MMOL/L (ref 7–16)
BASOPHILS # BLD: 0.03 K/UL (ref 0–0.2)
BASOPHILS NFR BLD: 1 % (ref 0–2)
BNP SERPL-MCNC: 75 PG/ML (ref 0–125)
BUN SERPL-MCNC: 17 MG/DL (ref 6–23)
CALCIUM SERPL-MCNC: 9.1 MG/DL (ref 8.6–10.2)
CHLORIDE SERPL-SCNC: 109 MMOL/L (ref 98–107)
CHOLEST SERPL-MCNC: 128 MG/DL
CO2 SERPL-SCNC: 22 MMOL/L (ref 22–29)
CREAT SERPL-MCNC: 1.1 MG/DL (ref 0.5–1)
EOSINOPHIL # BLD: 0.25 K/UL (ref 0.05–0.5)
EOSINOPHILS RELATIVE PERCENT: 4 % (ref 0–6)
ERYTHROCYTE [DISTWIDTH] IN BLOOD BY AUTOMATED COUNT: 12.5 % (ref 11.5–15)
GFR, ESTIMATED: 55 ML/MIN/1.73M2
GLUCOSE SERPL-MCNC: 108 MG/DL (ref 74–99)
HCT VFR BLD AUTO: 32.6 % (ref 34–48)
HDLC SERPL-MCNC: 64 MG/DL
HGB BLD-MCNC: 10.6 G/DL (ref 11.5–15.5)
IMM GRANULOCYTES # BLD AUTO: <0.03 K/UL (ref 0–0.58)
IMM GRANULOCYTES NFR BLD: 0 % (ref 0–5)
LDLC SERPL CALC-MCNC: 50 MG/DL
LYMPHOCYTES NFR BLD: 2.69 K/UL (ref 1.5–4)
LYMPHOCYTES RELATIVE PERCENT: 44 % (ref 20–42)
MCH RBC QN AUTO: 29.9 PG (ref 26–35)
MCHC RBC AUTO-ENTMCNC: 32.5 G/DL (ref 32–34.5)
MCV RBC AUTO: 91.8 FL (ref 80–99.9)
MONOCYTES NFR BLD: 0.45 K/UL (ref 0.1–0.95)
MONOCYTES NFR BLD: 7 % (ref 2–12)
NEUTROPHILS NFR BLD: 44 % (ref 43–80)
NEUTS SEG NFR BLD: 2.72 K/UL (ref 1.8–7.3)
PLATELET # BLD AUTO: 183 K/UL (ref 130–450)
PMV BLD AUTO: 10.9 FL (ref 7–12)
POTASSIUM SERPL-SCNC: 3.8 MMOL/L (ref 3.5–5)
RBC # BLD AUTO: 3.55 M/UL (ref 3.5–5.5)
SODIUM SERPL-SCNC: 141 MMOL/L (ref 132–146)
TRIGL SERPL-MCNC: 68 MG/DL
VLDLC SERPL CALC-MCNC: 14 MG/DL
WBC OTHER # BLD: 6.2 K/UL (ref 4.5–11.5)

## 2024-06-10 PROCEDURE — 80048 BASIC METABOLIC PNL TOTAL CA: CPT

## 2024-06-10 PROCEDURE — 99214 OFFICE O/P EST MOD 30 MIN: CPT

## 2024-06-10 PROCEDURE — 85025 COMPLETE CBC W/AUTO DIFF WBC: CPT

## 2024-06-10 PROCEDURE — 82306 VITAMIN D 25 HYDROXY: CPT

## 2024-06-10 PROCEDURE — 83880 ASSAY OF NATRIURETIC PEPTIDE: CPT

## 2024-06-10 PROCEDURE — 80061 LIPID PANEL: CPT

## 2024-06-10 ASSESSMENT — PATIENT HEALTH QUESTIONNAIRE - PHQ9
2. FEELING DOWN, DEPRESSED OR HOPELESS: SEVERAL DAYS
SUM OF ALL RESPONSES TO PHQ QUESTIONS 1-9: 2
1. LITTLE INTEREST OR PLEASURE IN DOING THINGS: SEVERAL DAYS
SUM OF ALL RESPONSES TO PHQ9 QUESTIONS 1 & 2: 2
SUM OF ALL RESPONSES TO PHQ QUESTIONS 1-9: 2

## 2024-06-10 NOTE — PLAN OF CARE
Problem: Chronic Conditions and Co-morbidities  Goal: Patient's chronic conditions and co-morbidity symptoms are monitored and maintained or improved  Flowsheets (Taken 6/10/2024 6889)  Care Plan - Patient's Chronic Conditions and Co-Morbidity Symptoms are Monitored and Maintained or Improved: Monitor and assess patient's chronic conditions and comorbid symptoms for stability, deterioration, or improvement

## 2024-06-10 NOTE — PROGRESS NOTES
Congestive Heart Failure Clinic   [facility]       Referring Provider: -  Primary Care Physician: Gabriele Lr MD   Cardiologist: Dr. Borja  Nephrologist: N/A      HISTORY OF PRESENT ILLNESS:     Nasra Loyd is a 58 y.o. (1965) female with a history of HFmrEF(EF 41%-49%), most recent EF:  Lab Results   Component Value Date    LVEF 43 11/01/2022         She presents to the CHF clinic for ongoing evaluation and monitoring of heart failure.    In the CHF clinic today she denies any adverse symptoms except:  [] Dizziness or lightheadedness   [] Syncope or near syncope  [] Recent Fall  [] Shortness of breath at rest   [x] Dyspnea with exertion  [] Decline in functional capacity (unable to perform activities they had previously been able to do)  [] Fatigue   [] Orthopnea  [] PND  [] Nocturnal cough  [] Hemoptysis  [] Chest pain, pressure, or discomfort  [] Palpitations  [] Abdominal distention  [] Abdominal bloating  [] Early satiety  [] Blood in stool   [] Diarrhea  [] Constipation  [] Nausea/Vomiting  [] Decreased urinary response to oral diuretic   [] Scrotal swelling   [] Lower extremity edema  [] Used PRN doses of oral diuretic   [] Weight gain     Wt Readings from Last 3 Encounters:   06/10/24 134.3 kg (296 lb)   05/13/24 134.3 kg (296 lb)   03/25/24 133.4 kg (294 lb)           SOCIAL HISTORY:  [x] Denies tobacco, alcohol or illicit drug abuse  [] Tobacco use:  [] ETOH use:  [] Illicit drug use:        MEDICATIONS:    Allergies   Allergen Reactions    Bidil [Isosorb Dinitrate-Hydralazine] Other (See Comments)     Difficulty breathing    Doxycycline Swelling and Other (See Comments)    Iodine Hives     IV dye    Toradol [Ketorolac Tromethamine] Anaphylaxis    Hydralazine     Iodides Hives     Prior to Visit Medications    Medication Sig Taking? Authorizing Provider   albuterol sulfate HFA (PROVENTIL;VENTOLIN;PROAIR) 108 (90 Base) MCG/ACT inhaler Inhale 2 puffs into the

## 2024-06-13 ENCOUNTER — OFFICE VISIT (OUTPATIENT)
Dept: CARDIOLOGY CLINIC | Age: 59
End: 2024-06-13

## 2024-06-13 VITALS
DIASTOLIC BLOOD PRESSURE: 72 MMHG | SYSTOLIC BLOOD PRESSURE: 118 MMHG | BODY MASS INDEX: 48.82 KG/M2 | HEART RATE: 67 BPM | HEIGHT: 65 IN | WEIGHT: 293 LBS | RESPIRATION RATE: 18 BRPM

## 2024-06-13 DIAGNOSIS — I50.43 CHF (CONGESTIVE HEART FAILURE), NYHA CLASS I, ACUTE ON CHRONIC, COMBINED (HCC): Primary | ICD-10-CM

## 2024-06-13 DIAGNOSIS — I42.9 CARDIOMYOPATHY, UNSPECIFIED TYPE (HCC): ICD-10-CM

## 2024-06-13 NOTE — PATIENT INSTRUCTIONS
Patient on GDMT for LV dysfunction and at goal levels continue Coreg 25 mg p.o. twice daily, Entresto to 97/103 mg  p.o. twice daily, Aldactone 25 mg po daily and Jardiance 10 mg p.o. daily.  Continue amlodipine 5 mg p.o. daily for hypertension.    Repeat limited echo to assess LV function  Follow low-salt diet restrict daily salt intake less than 2 g.  Continue furosemide 20 mg p.o. daily and rosuvastatin 20 mg p.o. daily recent lipid panel was reviewed, LDL is at goal level.  Patient was also recommended to cut down calories and and also walk on a regular basis for weight loss management.  Patient was again advised to get a sleep study as an outpatient to rule out obstructive sleep apnea.   Patient was advised to discuss with her primary care provider regarding GLP-1 agonist such as Ozempic, or Mounjaro for weight loss management.  Follow-up with me in 6 months.

## 2024-06-13 NOTE — PROGRESS NOTES
OUTPATIENT CARDIOLOGY FOLLOW-UP    Name: Nasra Loyd    Age: 58 y.o.    Primary Care Physician: Gabriele Lr MD    Date of Service: 6/13/2024    Chief Complaint:   Chief Complaint   Patient presents with    Follow-up     6 month follow up.       Interim History:   Mrs. Loyd is a 58-year-old -American female with history of mitral valve prolapse, hyperlipidemia, hypertension, bronchial asthma, morbid obesity with a BMI of 43, osteoarthritis, GERD, presented to the office for follow up visit. She was admitted to the hospital on 10/30/22 with worsening of SOB which started 3 weeks prior to presentation. Her symptoms started as MOORE and progressed to orthopnea and PND. She had also had flu like symptoms with intermittent  MS chest pain with exertion 3 weeks prior to presentation. Denies smoking/ETOH/illicit drugs.  Her mother had CABG in her 50s. She had a stress test and an echo 20 years back and was told that she has mitral valve prolapse. She was seen as new consult for new onset heart failure and LV dysfunction with an EF of 45%. She was diagnosed with decompensated heart failure and poorly controlled hypertension and other co-morbid conditions including HLD, asthma, GERD and OA. She was initiated on GDMT including coreg and lisinopril .  Patient was discharged home on 10/30/22 after treating for flash pulmonary edema, non ischemic cardiomyopathy and hypertensive emergency.  She was also diagnosed with hypertensive emergency.  She was evaluated in the emergency room on 11/10/2022 with enteritis and hematuria and was discharged home.  Now, she is compliant with medications. She does not take any over-the-counter arthritis medications.  She told me that she is not very compliant with her salt intake.    She was hospitalized from 10/30/2022 to 11/2/2020 due to flash pulm edema and hypertensive emergency.  She was also diagnosed a new onset LV dysfunction with an EF of 40-45% along with hyperlipidemia,

## 2024-06-14 ENCOUNTER — TELEPHONE (OUTPATIENT)
Dept: SURGERY | Age: 59
End: 2024-06-14

## 2024-06-14 NOTE — TELEPHONE ENCOUNTER
MA made attempt to contact patient regarding missed appointment on 06/08. Patient unavailable at the time. MA left detailed message requesting return call to reschedule appointment per referral.     Electronically signed by Mariam Cevallos MA on 6/14/2024 at 1:11 PM

## 2024-06-24 ENCOUNTER — HOSPITAL ENCOUNTER (OUTPATIENT)
Dept: CARDIOLOGY | Age: 59
Discharge: HOME | End: 2024-06-26
Attending: INTERNAL MEDICINE
Payer: COMMERCIAL

## 2024-06-24 VITALS
DIASTOLIC BLOOD PRESSURE: 72 MMHG | HEIGHT: 65 IN | BODY MASS INDEX: 48.82 KG/M2 | WEIGHT: 293 LBS | SYSTOLIC BLOOD PRESSURE: 118 MMHG

## 2024-06-24 DIAGNOSIS — I42.9 CARDIOMYOPATHY, UNSPECIFIED TYPE (HCC): ICD-10-CM

## 2024-06-24 PROCEDURE — 2580000003 HC RX 258: Performed by: INTERNAL MEDICINE

## 2024-06-24 PROCEDURE — 93308 TTE F-UP OR LMTD: CPT

## 2024-06-24 RX ORDER — SODIUM CHLORIDE 0.9 % (FLUSH) 0.9 %
10 SYRINGE (ML) INJECTION PRN
Status: DISCONTINUED | OUTPATIENT
Start: 2024-06-24 | End: 2024-06-27 | Stop reason: HOSPADM

## 2024-06-24 RX ADMIN — SODIUM CHLORIDE, PRESERVATIVE FREE 10 ML: 5 INJECTION INTRAVENOUS at 12:16

## 2024-06-24 RX ADMIN — SODIUM CHLORIDE, PRESERVATIVE FREE 10 ML: 5 INJECTION INTRAVENOUS at 12:18

## 2024-06-27 ENCOUNTER — TELEPHONE (OUTPATIENT)
Dept: CARDIOLOGY CLINIC | Age: 59
End: 2024-06-27

## 2024-06-27 LAB
ECHO AO ASC DIAM: 2.7 CM
ECHO AO ASCENDING AORTA INDEX: 1.16 CM/M2
ECHO AO SINUS VALSALVA DIAM: 2.8 CM
ECHO AO SINUS VALSALVA INDEX: 1.2 CM/M2
ECHO BSA: 2.47 M2
ECHO EST RA PRESSURE: 8 MMHG
ECHO LA DIAMETER INDEX: 1.63 CM/M2
ECHO LA DIAMETER: 3.8 CM
ECHO LA VOL A-L A2C: 85 ML (ref 22–52)
ECHO LA VOL A-L A4C: 57 ML (ref 22–52)
ECHO LA VOL MOD A2C: 76 ML (ref 22–52)
ECHO LA VOL MOD A4C: 55 ML (ref 22–52)
ECHO LA VOLUME AREA LENGTH: 72 ML
ECHO LA VOLUME INDEX A-L A2C: 36 ML/M2 (ref 16–34)
ECHO LA VOLUME INDEX A-L A4C: 24 ML/M2 (ref 16–34)
ECHO LA VOLUME INDEX AREA LENGTH: 31 ML/M2 (ref 16–34)
ECHO LA VOLUME INDEX MOD A2C: 33 ML/M2 (ref 16–34)
ECHO LA VOLUME INDEX MOD A4C: 24 ML/M2 (ref 16–34)
ECHO LV EDV A2C: 129 ML
ECHO LV EDV A4C: 110 ML
ECHO LV EDV BP: 119 ML (ref 56–104)
ECHO LV EDV INDEX A4C: 47 ML/M2
ECHO LV EDV INDEX BP: 51 ML/M2
ECHO LV EDV NDEX A2C: 55 ML/M2
ECHO LV EJECTION FRACTION A2C: 64 %
ECHO LV EJECTION FRACTION A4C: 57 %
ECHO LV EJECTION FRACTION BIPLANE: 60 % (ref 55–100)
ECHO LV ESV A2C: 47 ML
ECHO LV ESV A4C: 47 ML
ECHO LV ESV BP: 47 ML (ref 19–49)
ECHO LV ESV INDEX A2C: 20 ML/M2
ECHO LV ESV INDEX A4C: 20 ML/M2
ECHO LV ESV INDEX BP: 20 ML/M2
ECHO LV FRACTIONAL SHORTENING: 26 % (ref 28–44)
ECHO LV INTERNAL DIMENSION DIASTOLE INDEX: 1.97 CM/M2
ECHO LV INTERNAL DIMENSION DIASTOLIC: 4.6 CM (ref 3.9–5.3)
ECHO LV INTERNAL DIMENSION SYSTOLIC INDEX: 1.46 CM/M2
ECHO LV INTERNAL DIMENSION SYSTOLIC: 3.4 CM
ECHO LV ISOVOLUMETRIC RELAXATION TIME (IVRT): 106.1 MS
ECHO LV IVSD: 1.3 CM (ref 0.6–0.9)
ECHO LV IVSS: 1.4 CM
ECHO LV MASS 2D: 217.4 G (ref 67–162)
ECHO LV MASS INDEX 2D: 93.3 G/M2 (ref 43–95)
ECHO LV POSTERIOR WALL DIASTOLIC: 1.2 CM (ref 0.6–0.9)
ECHO LV POSTERIOR WALL SYSTOLIC: 1.6 CM
ECHO LV RELATIVE WALL THICKNESS RATIO: 0.52
ECHO MV "A" WAVE DURATION: 166.1 MSEC
ECHO MV A VELOCITY: 0.88 M/S
ECHO MV AREA PHT: 2.4 CM2
ECHO MV E DECELERATION TIME (DT): 231.1 MS
ECHO MV E VELOCITY: 0.98 M/S
ECHO MV E/A RATIO: 1.11
ECHO MV MAX VELOCITY: 1 M/S
ECHO MV MEAN GRADIENT: 2 MMHG
ECHO MV MEAN VELOCITY: 0.6 M/S
ECHO MV PEAK GRADIENT: 4 MMHG
ECHO MV PRESSURE HALF TIME (PHT): 93.5 MS
ECHO MV VTI: 37.6 CM
ECHO PVEIN A DURATION: 152.3 MS
ECHO PVEIN A VELOCITY: 0.2 M/S
ECHO PVEIN PEAK D VELOCITY: 0.5 M/S
ECHO PVEIN PEAK S VELOCITY: 0.5 M/S
ECHO PVEIN S/D RATIO: 1
ECHO RIGHT VENTRICULAR SYSTOLIC PRESSURE (RVSP): 34 MMHG
ECHO RV INTERNAL DIMENSION: 3.7 CM
ECHO RV TAPSE: 2.3 CM (ref 1.7–?)
ECHO TV REGURGITANT MAX VELOCITY: 2.54 M/S
ECHO TV REGURGITANT PEAK GRADIENT: 26 MMHG

## 2024-06-27 NOTE — TELEPHONE ENCOUNTER
Patient notified of echo results and Dr. Borja's recommendations. Patient put on list to call when January 2025 schedule opens.

## 2024-06-27 NOTE — TELEPHONE ENCOUNTER
----- Message from Parker Borja MD sent at 6/27/2024  1:15 PM EDT -----  Echocardiogram showed normal heart function mild stiffness of the heart mild leaky valve continue current medications and follow-up with me as scheduled.

## 2024-07-22 NOTE — PROGRESS NOTES
[facility] Internal Medicine      SUBJECTIVE:  Jones Thornton (:  1965) is a 59 y.o. female here for evaluation of the following chief complaint(s):  Follow-Up from Hospital (Patient was in the ER last night. Patient is on antibiotics ) and Abdominal Pain    The patient came for hospital follow-up today in the clinic. The patient did have symptoms of cramping lower abdominal pain and 2 episodes of vomiting but denied on fever diarrhea, dysuria, vaginal discharge and bleeding for the last 1 day. She visited to the ED last night for the problem and CT abdomen was done which showed diverticulosis without diverticulitis and features suggestive of small bowel enteritis. She was prescribed with cefdinir and Flagyl with other antispasmodic medication and was discharged. The patient had not started on antibiotics so based upon her symptoms Flagyl was discontinued and patient was advised to continue on cefdinir for total of 7 days in the clinic today. The patient was still complaining of coughing which was productive with whitish sputum since discharge from the hospital.  So cefdinir was continued for total 7 days. Patient stated that shortness of breath has improved after discharge from the hospital.  Due to insurance and cost issues the patient is not able to afford combination inhalation drug of albuterol and ipratropium. The patient is not taking any inhalation medication since discharge. The patient has not had any episodes of shortness of breath or any night symptoms. She mentioned of being short of breath after climbing up about 2-3 flights of stairs which improves with rest.  Patient denied of any chest pain, palpitation, headache and any new leg swelling. She is compliant on all medications. She will be following up with heart failure clinic on 2022  Plan for ischemic work-up by cardiology as outpatient, follow-up scheduled on 2022.       Review Patient call requesting medication refill a soon is possible.   of Systems   Constitutional:  Negative for activity change, appetite change, chills, fatigue and fever. HENT:  Negative for rhinorrhea, sneezing, sore throat and trouble swallowing. Respiratory:  Positive for cough and shortness of breath. Negative for chest tightness and wheezing. Cardiovascular:  Negative for chest pain, palpitations and leg swelling. Gastrointestinal:  Positive for abdominal pain, nausea and vomiting. Negative for blood in stool, constipation and diarrhea. Endocrine: Negative for polydipsia, polyphagia and polyuria. Genitourinary:  Negative for dysuria, frequency, genital sores and urgency. Musculoskeletal:  Negative for back pain, joint swelling, myalgias, neck pain and neck stiffness. Skin:  Negative for color change, pallor, rash and wound. Neurological:  Negative for dizziness, tremors, syncope, light-headedness, numbness and headaches. Hematological:  Negative for adenopathy. Does not bruise/bleed easily. Psychiatric/Behavioral:  Negative for confusion, decreased concentration and sleep disturbance. The patient is not nervous/anxious.       Current Outpatient Medications on File Prior to Visit   Medication Sig Dispense Refill    cefdinir (OMNICEF) 300 MG capsule Take 1 capsule by mouth 2 times daily for 7 days 14 capsule 0    metroNIDAZOLE (FLAGYL) 500 MG tablet Take 1 tablet by mouth 2 times daily for 7 days 14 tablet 0    prochlorperazine (COMPAZINE) 10 MG tablet Take 1 tablet by mouth every 6 hours as needed (nausea) 28 tablet 0    albuterol sulfate HFA (PROVENTIL;VENTOLIN;PROAIR) 108 (90 Base) MCG/ACT inhaler Inhale 2 puffs into the lungs every 6 hours as needed for Wheezing or Shortness of Breath 1 each 6    cetirizine (ZYRTEC) 10 MG tablet Take 1 tablet by mouth daily as needed for Allergies 30 tablet 4    rosuvastatin (CRESTOR) 20 MG tablet Take 1 tablet by mouth nightly 30 tablet 2    lisinopril (PRINIVIL;ZESTRIL) 20 MG tablet Take 1 tablet by mouth daily 30 tablet 2    carvedilol (COREG) 12.5 MG tablet Take 1 tablet by mouth 2 times daily (with meals) 60 tablet 2    Vitamin D, Ergocalciferol, 06249 units CAPS Take 50,000 Units by mouth once a week for 8 doses 6 capsule 0    fluticasone (FLONASE) 50 MCG/ACT nasal spray 1 spray by Nasal route daily 1 Bottle 4    albuterol-ipratropium (COMBIVENT RESPIMAT)  MCG/ACT AERS inhaler Inhale 1 puff into the lungs every 6 hours (Patient not taking: Reported on 11/9/2022) 1 each 2    ibuprofen (IBU) 800 MG tablet Take 1 tablet by mouth every 8 hours as needed for Pain 21 tablet 0    Acetaminophen 650 MG TABS Take 500 mg by mouth every 6 hours as needed for Pain 20 tablet 0     No current facility-administered medications on file prior to visit. OBJECTIVE:    VS:   Vitals:    11/09/22 0925   BP: (!) 143/68   Site: Right Upper Arm   Position: Sitting   Cuff Size: Large Adult   Pulse: 83   Resp: 20   Temp: 97.8 °F (36.6 °C)   TempSrc: Temporal   SpO2: 93%   Weight: 255 lb 4.8 oz (115.8 kg)   Height: 5' 5\" (1.651 m)     Physical Exam:  Vitals: BP (!) 143/68 (Site: Right Upper Arm, Position: Sitting, Cuff Size: Large Adult)   Pulse 83   Temp 97.8 °F (36.6 °C) (Temporal)   Resp 20   Ht 5' 5\" (1.651 m)   Wt 255 lb 4.8 oz (115.8 kg)   SpO2 93%   BMI 42.48 kg/m²     I & O - 24hr: [unfilled]   General Appearance: alert, appears stated age, and cooperative  HEENT:  Head: Normocephalic, no lesions, without obvious abnormality.   Neck: no adenopathy, no carotid bruit, no JVD, supple, symmetrical, trachea midline, and thyroid not enlarged, symmetric, no tenderness/mass/nodules  Lung: clear to auscultation bilaterally, decreased breath sounds B/L, NVBS, no added sound  Heart: regular rate and rhythm, S1, S2 normal, no murmur, click, rub or gallop  Abdomen: soft, non-tender; bowel sounds normal; no masses,  no organomegaly  Extremities:  extremities normal, atraumatic, no cyanosis or edema  Musculokeletal: No joint swelling, no muscle tenderness. ROM normal in all joints of extremities. Neurologic: Mental status: Alert, oriented, thought content appropriate         ASSESSMENT/PLAN:  1. Uncomplicated asthma, unspecified asthma severity, unspecified whether persistent  -     albuterol sulfate HFA (VENTOLIN HFA) 108 (90 Base) MCG/ACT inhaler; Inhale 2 puffs into the lungs 4 times daily as needed for Wheezing, Disp-18 g, R-0Normal  2. Hematuria, unspecified type  -     Urinalysis;  Future     New onset decompensated heart failure with left ventricular dysfunction  -Echo (11/1/22) EF 40 to 45%, mild dilated left ventricle, anterior anterolateral wall hypokinetic, stage II left ventricular diastolic dysfunction, moderate concentric LVH  -Cardio followed up during admission started on lisinopril 20 mg daily, carvedilol 12.5 mg twice daily and rosuvastatin 20 mg daily  -Ischemic work-up as per cardiology on outpatient basis, follow-up scheduled on 12/1/22    Enteritis  -Crampy lower abdominal pain with episodes of vomiting and nausea  -Presented to the ED, CT abdomen showed left hemicolon diverticulosis without diverticulitis and several segments of small bowel enteritis  -UA showed 5-10 RBC, no features of infection  -Started on cefdinir and Flagyl  -Discontinue Flagyl  -Continue cefdinir as patient has productive cough without fever  -Repeat UA after 1 week to see for hematuria    Essential hypertension  -Blood pressure on presentation 143/68 improving  -On carvedilol 12.5 mg 2 times daily and lisinopril 20 mg daily   -Continue on medication and follow recommendation from cardio  -Monitor blood pressure regularly at home    History of asthma  -Was discharged on Combivent and rescue albuterol  -Not taking Combivent due to cost issues  -No episodes of worsening shortness of breath and baseline shortness of breath while walking uphill  -Discontinue on Combivent and use as needed albuterol   -PFT in future    History of diverticulitis  -CT abdomen showed left hemicolon diverticulosis without diverticulitis 11/9/22    History hyperlipidemia  -On rosuvastatin 20 mg nightly  -Lipid panel in future    Obesity  -BMI 42.48,   -Patient counseled for dietary and lifestyle modification    RTC:  Return in about 5 weeks (around 12/14/2022) for PCP follow up. I have reviewed my findings and recommendations with Ciera Melvin and Dr. Elan Andrews.     Gabrielle Vogel MD   11/9/2022 6:02 PM

## 2024-09-09 ENCOUNTER — HOSPITAL ENCOUNTER (OUTPATIENT)
Dept: OTHER | Age: 59
Setting detail: THERAPIES SERIES
Discharge: HOME | End: 2024-09-09
Payer: COMMERCIAL

## 2024-09-09 VITALS
SYSTOLIC BLOOD PRESSURE: 136 MMHG | OXYGEN SATURATION: 96 % | BODY MASS INDEX: 49.09 KG/M2 | DIASTOLIC BLOOD PRESSURE: 64 MMHG | WEIGHT: 293 LBS | RESPIRATION RATE: 18 BRPM | HEART RATE: 70 BPM

## 2024-09-09 LAB
ANION GAP SERPL CALCULATED.3IONS-SCNC: 12 MMOL/L (ref 7–16)
BNP SERPL-MCNC: <36 PG/ML (ref 0–125)
BUN SERPL-MCNC: 18 MG/DL (ref 6–23)
CALCIUM SERPL-MCNC: 9.3 MG/DL (ref 8.6–10.2)
CHLORIDE SERPL-SCNC: 106 MMOL/L (ref 98–107)
CO2 SERPL-SCNC: 21 MMOL/L (ref 22–29)
CREAT SERPL-MCNC: 1.2 MG/DL (ref 0.5–1)
GFR, ESTIMATED: 53 ML/MIN/1.73M2
GLUCOSE SERPL-MCNC: 103 MG/DL (ref 74–99)
POTASSIUM SERPL-SCNC: 4.1 MMOL/L (ref 3.5–5)
SODIUM SERPL-SCNC: 139 MMOL/L (ref 132–146)

## 2024-09-09 PROCEDURE — 99214 OFFICE O/P EST MOD 30 MIN: CPT

## 2024-09-09 PROCEDURE — 83880 ASSAY OF NATRIURETIC PEPTIDE: CPT

## 2024-09-09 PROCEDURE — 80048 BASIC METABOLIC PNL TOTAL CA: CPT

## 2024-09-09 ASSESSMENT — PATIENT HEALTH QUESTIONNAIRE - PHQ9
SUM OF ALL RESPONSES TO PHQ QUESTIONS 1-9: 3
4. FEELING TIRED OR HAVING LITTLE ENERGY: NOT AT ALL
SUM OF ALL RESPONSES TO PHQ QUESTIONS 1-9: 3
9. THOUGHTS THAT YOU WOULD BE BETTER OFF DEAD, OR OF HURTING YOURSELF: NOT AT ALL
1. LITTLE INTEREST OR PLEASURE IN DOING THINGS: NOT AT ALL
SUM OF ALL RESPONSES TO PHQ QUESTIONS 1-9: 3
7. TROUBLE CONCENTRATING ON THINGS, SUCH AS READING THE NEWSPAPER OR WATCHING TELEVISION: NOT AT ALL
3. TROUBLE FALLING OR STAYING ASLEEP: SEVERAL DAYS
SUM OF ALL RESPONSES TO PHQ9 QUESTIONS 1 & 2: 1
5. POOR APPETITE OR OVEREATING: NOT AT ALL
8. MOVING OR SPEAKING SO SLOWLY THAT OTHER PEOPLE COULD HAVE NOTICED. OR THE OPPOSITE, BEING SO FIGETY OR RESTLESS THAT YOU HAVE BEEN MOVING AROUND A LOT MORE THAN USUAL: SEVERAL DAYS
SUM OF ALL RESPONSES TO PHQ QUESTIONS 1-9: 3
2. FEELING DOWN, DEPRESSED OR HOPELESS: SEVERAL DAYS
6. FEELING BAD ABOUT YOURSELF - OR THAT YOU ARE A FAILURE OR HAVE LET YOURSELF OR YOUR FAMILY DOWN: NOT AT ALL

## 2024-10-14 ENCOUNTER — OFFICE VISIT (OUTPATIENT)
Dept: INTERNAL MEDICINE | Age: 59
End: 2024-10-14
Payer: COMMERCIAL

## 2024-10-14 VITALS
BODY MASS INDEX: 48.48 KG/M2 | RESPIRATION RATE: 18 BRPM | WEIGHT: 291 LBS | TEMPERATURE: 97.7 F | DIASTOLIC BLOOD PRESSURE: 62 MMHG | OXYGEN SATURATION: 95 % | SYSTOLIC BLOOD PRESSURE: 100 MMHG | HEART RATE: 79 BPM | HEIGHT: 65 IN

## 2024-10-14 DIAGNOSIS — I50.43 CHF (CONGESTIVE HEART FAILURE), NYHA CLASS I, ACUTE ON CHRONIC, COMBINED (HCC): ICD-10-CM

## 2024-10-14 DIAGNOSIS — Z12.11 ENCOUNTER FOR SCREENING COLONOSCOPY: ICD-10-CM

## 2024-10-14 DIAGNOSIS — M85.851 OSTEOPENIA OF RIGHT HIP: ICD-10-CM

## 2024-10-14 DIAGNOSIS — Z79.899 NEW MEDICATION ADDED: ICD-10-CM

## 2024-10-14 DIAGNOSIS — E78.5 HYPERLIPIDEMIA, UNSPECIFIED HYPERLIPIDEMIA TYPE: ICD-10-CM

## 2024-10-14 DIAGNOSIS — G62.9 NEUROPATHY: ICD-10-CM

## 2024-10-14 DIAGNOSIS — E55.9 VITAMIN D DEFICIENCY: ICD-10-CM

## 2024-10-14 DIAGNOSIS — Z23 FLU VACCINE NEED: Primary | ICD-10-CM

## 2024-10-14 DIAGNOSIS — J45.909 UNCOMPLICATED ASTHMA, UNSPECIFIED ASTHMA SEVERITY, UNSPECIFIED WHETHER PERSISTENT: ICD-10-CM

## 2024-10-14 DIAGNOSIS — I50.20 SYSTOLIC CONGESTIVE HEART FAILURE, UNSPECIFIED HF CHRONICITY (HCC): ICD-10-CM

## 2024-10-14 DIAGNOSIS — E66.01 CLASS 3 SEVERE OBESITY WITH BODY MASS INDEX (BMI) OF 45.0 TO 49.9 IN ADULT, UNSPECIFIED OBESITY TYPE, UNSPECIFIED WHETHER SERIOUS COMORBIDITY PRESENT: ICD-10-CM

## 2024-10-14 DIAGNOSIS — Z23 IMMUNIZATION DUE: ICD-10-CM

## 2024-10-14 DIAGNOSIS — E66.813 CLASS 3 SEVERE OBESITY WITH BODY MASS INDEX (BMI) OF 45.0 TO 49.9 IN ADULT, UNSPECIFIED OBESITY TYPE, UNSPECIFIED WHETHER SERIOUS COMORBIDITY PRESENT: ICD-10-CM

## 2024-10-14 DIAGNOSIS — I10 PRIMARY HYPERTENSION: ICD-10-CM

## 2024-10-14 DIAGNOSIS — G47.33 OSA (OBSTRUCTIVE SLEEP APNEA): ICD-10-CM

## 2024-10-14 PROCEDURE — 99214 OFFICE O/P EST MOD 30 MIN: CPT

## 2024-10-14 PROCEDURE — 99213 OFFICE O/P EST LOW 20 MIN: CPT

## 2024-10-14 PROCEDURE — 3074F SYST BP LT 130 MM HG: CPT

## 2024-10-14 PROCEDURE — 90656 IIV3 VACC NO PRSV 0.5 ML IM: CPT

## 2024-10-14 PROCEDURE — 3078F DIAST BP <80 MM HG: CPT

## 2024-10-14 RX ORDER — ASPIRIN 81 MG/1
81 TABLET, CHEWABLE ORAL DAILY
Qty: 90 TABLET | Refills: 1 | Status: SHIPPED | OUTPATIENT
Start: 2024-10-14

## 2024-10-14 RX ORDER — CHOLECALCIFEROL (VITAMIN D3) 25 MCG
1000 TABLET ORAL DAILY
Qty: 90 TABLET | Refills: 1 | Status: SHIPPED | OUTPATIENT
Start: 2024-10-14

## 2024-10-14 RX ORDER — ROSUVASTATIN CALCIUM 40 MG/1
40 TABLET, COATED ORAL NIGHTLY
Qty: 90 TABLET | Refills: 1 | Status: SHIPPED | OUTPATIENT
Start: 2024-10-14

## 2024-10-14 RX ORDER — CARVEDILOL 25 MG/1
25 TABLET ORAL 2 TIMES DAILY WITH MEALS
Qty: 180 TABLET | Refills: 1 | Status: SHIPPED | OUTPATIENT
Start: 2024-10-14

## 2024-10-14 RX ORDER — FUROSEMIDE 20 MG
20 TABLET ORAL DAILY
Qty: 90 TABLET | Refills: 1 | Status: SHIPPED | OUTPATIENT
Start: 2024-10-14

## 2024-10-14 RX ORDER — PHENOL 1.4 %
1 AEROSOL, SPRAY (ML) MUCOUS MEMBRANE DAILY
Qty: 90 TABLET | Refills: 1 | Status: SHIPPED | OUTPATIENT
Start: 2024-10-14

## 2024-10-14 RX ORDER — AMLODIPINE BESYLATE 5 MG/1
5 TABLET ORAL DAILY
Qty: 90 TABLET | Refills: 1 | Status: SHIPPED | OUTPATIENT
Start: 2024-10-14

## 2024-10-14 RX ORDER — ZOSTER VACCINE RECOMBINANT, ADJUVANTED 50 MCG/0.5
0.5 KIT INTRAMUSCULAR SEE ADMIN INSTRUCTIONS
Qty: 0.5 ML | Refills: 1 | Status: SHIPPED | OUTPATIENT
Start: 2024-10-14 | End: 2025-04-12

## 2024-10-14 RX ORDER — SPIRONOLACTONE 25 MG/1
25 TABLET ORAL DAILY
Qty: 90 TABLET | Refills: 1 | Status: SHIPPED | OUTPATIENT
Start: 2024-10-14

## 2024-10-14 RX ORDER — ALBUTEROL SULFATE 90 UG/1
2 INHALANT RESPIRATORY (INHALATION) EVERY 6 HOURS PRN
Qty: 1 EACH | Refills: 6 | Status: SHIPPED | OUTPATIENT
Start: 2024-10-14

## 2024-10-14 RX ORDER — GABAPENTIN 100 MG/1
100 CAPSULE ORAL NIGHTLY
Qty: 180 CAPSULE | Refills: 1 | Status: SHIPPED | OUTPATIENT
Start: 2024-10-14 | End: 2025-10-09

## 2024-10-14 ASSESSMENT — ENCOUNTER SYMPTOMS
VOMITING: 0
NAUSEA: 0
COUGH: 0
SHORTNESS OF BREATH: 0
CHEST TIGHTNESS: 0
ABDOMINAL PAIN: 0

## 2024-10-14 NOTE — PROGRESS NOTES
[facility]  Internal Medicine Residency Clinic  Attending Physician Statement:  Rufus Pizarro M.D., F.A.C.P.  Patient is seen for fu visit today.  -- acute and chronic problems addressed  Addressed when applicable- Health maintenance issues of vaccinations, social determinants/depression/CA screening, tobacco cessation etc...  I have seen/discussed the case, including pertinent history and exam findings with the resident, review of last visit medical records/labs-   I agree with the assessment, plan and orders as documented by the resident.    -Billiing assessed by medical complexity of case  My assessment of high points of todays visit as follows:    Hx of reduced EF  Chf stable  No active symptoms  Repeat EF- now normal  Tolerating multiple cardiac meds  /62 (Site: Left Upper Arm, Position: Sitting, Cuff Size: Large Adult)   Pulse 79   Temp 97.7 °F (36.5 °C) (Temporal)   Resp 18   Ht 1.651 m (5' 5\")   Wt 132 kg (291 lb)   SpO2 95%   BMI 48.42 kg/m²   No orthostatic hypotension symptoms noted  - may wean BP meds if need-  EF now normal  Today euvolemic  - on jardiance daily  +spironlactone +lasix 20mg QD- occasionally takes extra if recurrent edema  Follows chf clinic    On asa  Crestor - ldl 50  Inc bmi- referred in past bariatrics (didn't go in past)  Re refer  Sleep study also ordered - .       Colon CA order- Rx again today  Flu vaccine today  (Ordered shingles in past Rx- didn't do)             
tablet by mouth nightly, Disp-90 tablet, R-1Normal  11. Encounter for screening colonoscopy  -     [facility]  12. GRACE (obstructive sleep apnea)  -     Baseline Diagnostic Sleep Study; Future  13. Immunization due  -     zoster recombinant adjuvanted vaccine (SHINGRIX) 50 MCG/0.5ML SUSR injection; Inject 0.5 mLs into the muscle See Admin Instructions 1 dose now and repeat in 2-6 months, Disp-0.5 mL, R-1Normal  14. Class 3 severe obesity with body mass index (BMI) of 45.0 to 49.9 in adult, unspecified obesity type, unspecified whether serious comorbidity present  -     Alfonso Eng MD, Bariatrics, [facility]       HFimpEF, with mild CAD  - ECHO on 11/1/22 showed EF 40 -45%, mild dilated left ventricle, anterior anterolateral wall hypokinetic, stage II left ventricular diastolic dysfunction  - EF improved to 55-60% with grade I diastolic dysfunction on 6/2024  -Left heart cath on 12/7/2020 showed mild CAD, elevated LVEDP at 25 and uncontrolled blood pressure  -Regularly following with cardiology Dr Borja  -Continue on carvedilol 25 mg twice daily, Entresto  mg twice daily, spironolactone 25 mg daily and Jardiance 10 mg daily and on Furosemide 20 mg daily  -Continue on aspirin 81 mg daily     2. TIA  - Seen by neurology on 2/4/23 for rt sided paraesthesias and aphasia  - S/P DAPT for 21 days  - Continue on ASA 81 mg daily      3. Lumbar spinal stenosis  - Chronic pain still present  - Followed with PT and continuing some exercises at home  - Uses Diclofenac gel and Tylenol as needed     4.  Essential hypertension  -Blood pressure today was 100/62, not symptomatic  - No orthostatic symptoms  -Previously adjusted blood pressure medications, was taken off isosorbide dinitrate-hydralazine as patient is allergic  -Continue on medication and follow recommendation from cardio  -Monitor blood pressure regularly at home  - Will need dose adjustment if having hypotensive

## 2024-10-14 NOTE — PATIENT INSTRUCTIONS
Thank you for coming to your follow up appointment   Please take your medications as directed and keep your follow up appointment in 3 months.  Call our office if you have any questions or concerns at [phone]  Follow up with weight loss clinic and General surgery as scheduled.     Gabriele Lr MD

## 2024-10-17 ENCOUNTER — TELEPHONE (OUTPATIENT)
Dept: SURGERY | Age: 59
End: 2024-10-17

## 2024-11-22 ENCOUNTER — TELEPHONE (OUTPATIENT)
Dept: INTERNAL MEDICINE | Age: 59
End: 2024-11-22

## 2024-11-22 NOTE — TELEPHONE ENCOUNTER
Pt called regarding insurance. States this is time for enrollment in insurance plan. [facility].   States she left message with billing office yesterday and has not heard back. Notified would check with front office staff and someone would call her back later to let her know.

## 2024-12-09 ENCOUNTER — HOSPITAL ENCOUNTER (OUTPATIENT)
Dept: OTHER | Age: 59
Setting detail: THERAPIES SERIES
Discharge: HOME | End: 2024-12-09
Payer: COMMERCIAL

## 2024-12-09 VITALS
HEART RATE: 78 BPM | RESPIRATION RATE: 18 BRPM | SYSTOLIC BLOOD PRESSURE: 130 MMHG | OXYGEN SATURATION: 95 % | BODY MASS INDEX: 48.92 KG/M2 | WEIGHT: 293 LBS | DIASTOLIC BLOOD PRESSURE: 60 MMHG

## 2024-12-09 LAB
ANION GAP SERPL CALCULATED.3IONS-SCNC: 9 MMOL/L (ref 7–16)
BNP SERPL-MCNC: 58 PG/ML (ref 0–125)
BUN SERPL-MCNC: 16 MG/DL (ref 6–23)
CALCIUM SERPL-MCNC: 9.5 MG/DL (ref 8.6–10.2)
CHLORIDE SERPL-SCNC: 107 MMOL/L (ref 98–107)
CO2 SERPL-SCNC: 23 MMOL/L (ref 22–29)
CREAT SERPL-MCNC: 1.3 MG/DL (ref 0.5–1)
GFR, ESTIMATED: 48 ML/MIN/1.73M2
GLUCOSE SERPL-MCNC: 120 MG/DL (ref 74–99)
POTASSIUM SERPL-SCNC: 3.8 MMOL/L (ref 3.5–5)
SODIUM SERPL-SCNC: 139 MMOL/L (ref 132–146)

## 2024-12-09 PROCEDURE — 99214 OFFICE O/P EST MOD 30 MIN: CPT

## 2024-12-09 PROCEDURE — 80048 BASIC METABOLIC PNL TOTAL CA: CPT

## 2024-12-09 PROCEDURE — 83880 ASSAY OF NATRIURETIC PEPTIDE: CPT

## 2024-12-09 ASSESSMENT — PATIENT HEALTH QUESTIONNAIRE - PHQ9
1. LITTLE INTEREST OR PLEASURE IN DOING THINGS: NOT AT ALL
SUM OF ALL RESPONSES TO PHQ QUESTIONS 1-9: 1
SUM OF ALL RESPONSES TO PHQ9 QUESTIONS 1 & 2: 1
SUM OF ALL RESPONSES TO PHQ QUESTIONS 1-9: 1
SUM OF ALL RESPONSES TO PHQ QUESTIONS 1-9: 1
2. FEELING DOWN, DEPRESSED OR HOPELESS: SEVERAL DAYS
SUM OF ALL RESPONSES TO PHQ QUESTIONS 1-9: 1

## 2024-12-09 NOTE — PLAN OF CARE
Problem: Chronic Conditions and Co-morbidities  Goal: Patient's chronic conditions and co-morbidity symptoms are monitored and maintained or improved  Flowsheets (Taken 12/9/2024 0903)  Care Plan - Patient's Chronic Conditions and Co-Morbidity Symptoms are Monitored and Maintained or Improved: Monitor and assess patient's chronic conditions and comorbid symptoms for stability, deterioration, or improvement

## 2024-12-09 NOTE — PROGRESS NOTES
Congestive Heart Failure Clinic   [facility]       Referring Provider: -  Primary Care Physician: Gabriele Lr MD   Cardiologist: Dr. Borja  Nephrologist: N/A      HISTORY OF PRESENT ILLNESS:     Nasra Loyd is a 59 y.o. (1965) female with a history of HFmrEF(EF 41%-49%), most recent EF:  Lab Results   Component Value Date    LVEF 43 11/01/2022         She presents to the CHF clinic for ongoing evaluation and monitoring of heart failure.    In the CHF clinic today she denies any adverse symptoms except:  [] Dizziness or lightheadedness   [] Syncope or near syncope  [] Recent Fall  [] Shortness of breath at rest   [x] Dyspnea with exertion- recovers with rest  [] Decline in functional capacity (unable to perform activities they had previously been able to do)  [] Fatigue   [] Orthopnea  [] PND  [] Nocturnal cough  [] Hemoptysis  [] Chest pain, pressure, or discomfort  [] Palpitations  [] Abdominal distention  [] Abdominal bloating  [] Early satiety  [] Blood in stool   [] Diarrhea  [] Constipation  [] Nausea/Vomiting  [] Decreased urinary response to oral diuretic   [] Scrotal swelling   [] Lower extremity edema  [] Used PRN doses of oral diuretic   [] Weight gain (4 pounds over 4 months, eating more)    Wt Readings from Last 3 Encounters:   12/09/24 133.4 kg (294 lb)   10/14/24 132 kg (291 lb)   09/09/24 133.8 kg (295 lb)           SOCIAL HISTORY:  [x] Denies tobacco, alcohol or illicit drug abuse  [] Tobacco use:  [] ETOH use:  [] Illicit drug use:        MEDICATIONS:    Allergies   Allergen Reactions    Bidil [Isosorb Dinitrate-Hydralazine] Other (See Comments)     Difficulty breathing    Doxycycline Swelling and Other (See Comments)    Iodine Hives     IV dye    Toradol [Ketorolac Tromethamine] Anaphylaxis    Hydralazine     Iodides Hives     Prior to Visit Medications    Medication Sig Taking? Authorizing Provider   albuterol sulfate HFA (PROVENTIL;VENTOLIN;PROAIR)

## 2025-02-03 ENCOUNTER — TELEPHONE (OUTPATIENT)
Dept: INTERNAL MEDICINE | Age: 60
End: 2025-02-03

## 2025-02-03 DIAGNOSIS — G47.33 OSA (OBSTRUCTIVE SLEEP APNEA): Primary | ICD-10-CM

## 2025-02-06 ENCOUNTER — OFFICE VISIT (OUTPATIENT)
Dept: CARDIOLOGY CLINIC | Age: 60
End: 2025-02-06

## 2025-02-06 VITALS
BODY MASS INDEX: 48.82 KG/M2 | SYSTOLIC BLOOD PRESSURE: 112 MMHG | OXYGEN SATURATION: 98 % | WEIGHT: 293 LBS | DIASTOLIC BLOOD PRESSURE: 70 MMHG | RESPIRATION RATE: 18 BRPM | TEMPERATURE: 97.1 F | HEART RATE: 54 BPM | HEIGHT: 65 IN

## 2025-02-06 DIAGNOSIS — I10 PRIMARY HYPERTENSION: ICD-10-CM

## 2025-02-06 DIAGNOSIS — E78.5 HYPERLIPIDEMIA, UNSPECIFIED HYPERLIPIDEMIA TYPE: ICD-10-CM

## 2025-02-06 DIAGNOSIS — I50.43 CHF (CONGESTIVE HEART FAILURE), NYHA CLASS I, ACUTE ON CHRONIC, COMBINED (HCC): Primary | ICD-10-CM

## 2025-02-06 DIAGNOSIS — I50.20 SYSTOLIC CONGESTIVE HEART FAILURE, UNSPECIFIED HF CHRONICITY (HCC): ICD-10-CM

## 2025-02-06 DIAGNOSIS — Z79.899 NEW MEDICATION ADDED: ICD-10-CM

## 2025-02-06 RX ORDER — CARVEDILOL 25 MG/1
25 TABLET ORAL 2 TIMES DAILY WITH MEALS
Qty: 180 TABLET | Refills: 3 | Status: SHIPPED | OUTPATIENT
Start: 2025-02-06

## 2025-02-06 RX ORDER — AMLODIPINE BESYLATE 5 MG/1
5 TABLET ORAL DAILY
Qty: 90 TABLET | Refills: 3 | Status: SHIPPED | OUTPATIENT
Start: 2025-02-06

## 2025-02-06 RX ORDER — SPIRONOLACTONE 25 MG/1
25 TABLET ORAL DAILY
Qty: 90 TABLET | Refills: 3 | Status: SHIPPED | OUTPATIENT
Start: 2025-02-06

## 2025-02-06 RX ORDER — ROSUVASTATIN CALCIUM 40 MG/1
40 TABLET, COATED ORAL NIGHTLY
Qty: 90 TABLET | Refills: 3 | Status: SHIPPED | OUTPATIENT
Start: 2025-02-06

## 2025-02-06 NOTE — PATIENT INSTRUCTIONS
Patient on GDMT for LV dysfunction and at goal levels continue Coreg 25 mg p.o. twice daily, Entresto to 97/103 mg  p.o. twice daily, Aldactone 25 mg po daily and Jardiance 10 mg p.o. daily.  Continue amlodipine 5 mg p.o. daily for hypertension.    Repeat echo results were reviewed, as above and discussed with patient has normal LV systolic function grade 1 diastolic dysfunction, mild to moderate TR, RVSP 34 mmHg.  EF improved back to normal.  Follow low-salt diet restrict daily salt intake less than 2 g.  Continue furosemide 20 mg p.o. daily and rosuvastatin 40 mg p.o. daily recent lipid panel was reviewed, LDL is at goal level.  Patient was also recommended to cut down calories and and also walk on a regular basis for weight loss management.  Patient was again advised to get a sleep study as an outpatient to rule out obstructive sleep apnea, her insurance declined sleep study  Patient was advised to discuss with her primary care provider regarding GLP-1 agonist such as Ozempic, or Mounjaro for weight loss management.  Follow-up with me in 6 months.

## 2025-02-06 NOTE — PROGRESS NOTES
Mildly enlarged right atrium size.   Mild aortic stenosis.   Mild tricuspid regurgitation.   Pulmonary hypertension is mild .    TTE- 11/1/22   Mildly dilated left ventricle.   Ejection fraction is visually estimated at 40-45%.   Overall ejection fraction mild-to-moderately decreased .   Anterior and anterolateral walls are hypokinetic.   Moderate concentric left ventricular hypertrophy.   Stage II diastolic dysfunction.   Technically difficult examination. Definity echo contrast was used to   delineate endocardial borders.    TTE-6/24/2024:    Left Ventricle: Normal left ventricular systolic function with a visually estimated EF of 55 - 60%. Left ventricle size is normal. Normal wall thickness. Ventricular mass is normal. Findings consistent with concentric remodeling. Normal wall motion. Grade I diastolic dysfunction with normal LAP.    Right Ventricle: Right ventricle size is normal. Normal systolic function. TAPSE is 2.3 cm.    Tricuspid Valve: Mild to moderate regurgitation with a centrally directed jet. Normal RVSP. The estimated RVSP is 34 mmHg.    Interatrial Septum: Agitated saline study was negative with and without provocation.    Right Atrium: Right atrium is mildly dilated.    Pericardium: No pericardial effusion.    Image quality is good.      Stress test:        Cardiac catheterization 12/7/2022:   CORONARY ANATOMY:  Left main:  It is a long and short artery with no angiographic stenosis  noted.     LAD:  It is a large artery wrapping around the apex and giving rise to a  large bifurcating diagonal branch and several septal perforators.  There  was 30-40% discrete mid LAD stenosis.     Left circumflex:  It is a large artery giving rise to a very small first  obtuse marginal branch, a large trifurcating second obtuse marginal  branch, then to a smaller third obtuse marginal branch.  No angiographic  stenosis noted in the circumflex or its branches.     Right coronary artery:  It is medium in size

## 2025-02-07 SDOH — ECONOMIC STABILITY: INCOME INSECURITY: IN THE LAST 12 MONTHS, WAS THERE A TIME WHEN YOU WERE NOT ABLE TO PAY THE MORTGAGE OR RENT ON TIME?: NO

## 2025-02-07 SDOH — ECONOMIC STABILITY: FOOD INSECURITY: WITHIN THE PAST 12 MONTHS, THE FOOD YOU BOUGHT JUST DIDN'T LAST AND YOU DIDN'T HAVE MONEY TO GET MORE.: PATIENT DECLINED

## 2025-02-07 SDOH — ECONOMIC STABILITY: FOOD INSECURITY: WITHIN THE PAST 12 MONTHS, YOU WORRIED THAT YOUR FOOD WOULD RUN OUT BEFORE YOU GOT MONEY TO BUY MORE.: PATIENT DECLINED

## 2025-02-07 ASSESSMENT — PATIENT HEALTH QUESTIONNAIRE - PHQ9
SUM OF ALL RESPONSES TO PHQ QUESTIONS 1-9: 6
2. FEELING DOWN, DEPRESSED OR HOPELESS: NOT AT ALL
1. LITTLE INTEREST OR PLEASURE IN DOING THINGS: MORE THAN HALF THE DAYS
9. THOUGHTS THAT YOU WOULD BE BETTER OFF DEAD, OR OF HURTING YOURSELF: NOT AT ALL
8. MOVING OR SPEAKING SO SLOWLY THAT OTHER PEOPLE COULD HAVE NOTICED. OR THE OPPOSITE, BEING SO FIGETY OR RESTLESS THAT YOU HAVE BEEN MOVING AROUND A LOT MORE THAN USUAL: NOT AT ALL
5. POOR APPETITE OR OVEREATING: NOT AT ALL
5. POOR APPETITE OR OVEREATING: NOT AT ALL
9. THOUGHTS THAT YOU WOULD BE BETTER OFF DEAD, OR OF HURTING YOURSELF: NOT AT ALL
SUM OF ALL RESPONSES TO PHQ QUESTIONS 1-9: 6
SUM OF ALL RESPONSES TO PHQ QUESTIONS 1-9: 6
4. FEELING TIRED OR HAVING LITTLE ENERGY: MORE THAN HALF THE DAYS
1. LITTLE INTEREST OR PLEASURE IN DOING THINGS: MORE THAN HALF THE DAYS
SUM OF ALL RESPONSES TO PHQ QUESTIONS 1-9: 6
10. IF YOU CHECKED OFF ANY PROBLEMS, HOW DIFFICULT HAVE THESE PROBLEMS MADE IT FOR YOU TO DO YOUR WORK, TAKE CARE OF THINGS AT HOME, OR GET ALONG WITH OTHER PEOPLE: NOT DIFFICULT AT ALL
4. FEELING TIRED OR HAVING LITTLE ENERGY: MORE THAN HALF THE DAYS
6. FEELING BAD ABOUT YOURSELF - OR THAT YOU ARE A FAILURE OR HAVE LET YOURSELF OR YOUR FAMILY DOWN: NOT AT ALL
SUM OF ALL RESPONSES TO PHQ9 QUESTIONS 1 & 2: 2
8. MOVING OR SPEAKING SO SLOWLY THAT OTHER PEOPLE COULD HAVE NOTICED. OR THE OPPOSITE - BEING SO FIDGETY OR RESTLESS THAT YOU HAVE BEEN MOVING AROUND A LOT MORE THAN USUAL: NOT AT ALL
6. FEELING BAD ABOUT YOURSELF - OR THAT YOU ARE A FAILURE OR HAVE LET YOURSELF OR YOUR FAMILY DOWN: NOT AT ALL
2. FEELING DOWN, DEPRESSED OR HOPELESS: NOT AT ALL
3. TROUBLE FALLING OR STAYING ASLEEP: MORE THAN HALF THE DAYS
3. TROUBLE FALLING OR STAYING ASLEEP: MORE THAN HALF THE DAYS
10. IF YOU CHECKED OFF ANY PROBLEMS, HOW DIFFICULT HAVE THESE PROBLEMS MADE IT FOR YOU TO DO YOUR WORK, TAKE CARE OF THINGS AT HOME, OR GET ALONG WITH OTHER PEOPLE: NOT DIFFICULT AT ALL
7. TROUBLE CONCENTRATING ON THINGS, SUCH AS READING THE NEWSPAPER OR WATCHING TELEVISION: NOT AT ALL
7. TROUBLE CONCENTRATING ON THINGS, SUCH AS READING THE NEWSPAPER OR WATCHING TELEVISION: NOT AT ALL
SUM OF ALL RESPONSES TO PHQ QUESTIONS 1-9: 6

## 2025-02-07 ASSESSMENT — LIFESTYLE VARIABLES
HOW OFTEN DO YOU HAVE A DRINK CONTAINING ALCOHOL: NEVER
HOW MANY STANDARD DRINKS CONTAINING ALCOHOL DO YOU HAVE ON A TYPICAL DAY: PATIENT DOES NOT DRINK

## 2025-02-10 ENCOUNTER — OFFICE VISIT (OUTPATIENT)
Dept: INTERNAL MEDICINE | Age: 60
End: 2025-02-10
Payer: COMMERCIAL

## 2025-02-10 VITALS
HEIGHT: 65 IN | DIASTOLIC BLOOD PRESSURE: 63 MMHG | TEMPERATURE: 97.3 F | RESPIRATION RATE: 18 BRPM | WEIGHT: 293 LBS | BODY MASS INDEX: 48.82 KG/M2 | OXYGEN SATURATION: 97 % | SYSTOLIC BLOOD PRESSURE: 134 MMHG | HEART RATE: 71 BPM

## 2025-02-10 DIAGNOSIS — I50.43 CHF (CONGESTIVE HEART FAILURE), NYHA CLASS I, ACUTE ON CHRONIC, COMBINED (HCC): ICD-10-CM

## 2025-02-10 DIAGNOSIS — R42 DIZZINESS: ICD-10-CM

## 2025-02-10 DIAGNOSIS — Z59.71 INSURANCE COVERAGE PROBLEMS: Primary | ICD-10-CM

## 2025-02-10 DIAGNOSIS — J30.9 ALLERGIC RHINITIS, UNSPECIFIED SEASONALITY, UNSPECIFIED TRIGGER: ICD-10-CM

## 2025-02-10 DIAGNOSIS — G47.33 OSA (OBSTRUCTIVE SLEEP APNEA): ICD-10-CM

## 2025-02-10 PROCEDURE — 99212 OFFICE O/P EST SF 10 MIN: CPT

## 2025-02-10 RX ORDER — LORATADINE 10 MG/1
10 TABLET ORAL DAILY
Qty: 30 TABLET | Refills: 0 | Status: SHIPPED | OUTPATIENT
Start: 2025-02-10 | End: 2025-03-12

## 2025-02-10 RX ORDER — MECLIZINE HYDROCHLORIDE 25 MG/1
25 TABLET ORAL 3 TIMES DAILY PRN
Qty: 30 TABLET | Refills: 0 | Status: SHIPPED | OUTPATIENT
Start: 2025-02-10 | End: 2025-02-20

## 2025-02-10 ASSESSMENT — ENCOUNTER SYMPTOMS
SHORTNESS OF BREATH: 0
ABDOMINAL PAIN: 0
COUGH: 0
CHEST TIGHTNESS: 0
VOMITING: 0
NAUSEA: 0

## 2025-02-10 NOTE — PROGRESS NOTES
[facility]  Internal Medicine Residency Clinic    Attending Physician Statement  I have discussed the case, including pertinent history and exam findings with the resident physician.  I agree with the assessment, plan and orders as documented by the resident. I have reviewed the relevant PMHx, PSHx, FamHx, SocialHx, medications, and allergies and updated history as appropriate.    Patient presents for routine follow up of medical problems.    HFrEF with improved EF, NYHA I -- continues on GDMT and follows with CHF clinic follow up scheduled for April.    Obesity class 3 BMI 49 -- advised medical weight loss clinic (referred last year however did not attend appt d/t insurance coverage concerns) and counseled to avoid calories in fluids.    BPPV -- ok for trial meclizine    Screening:  Previously scheduled surgical consult x2 but did not attend d/t coverage    Remainder of medical problems as per resident note.    Bill Coto,   2/10/2025 11:05 AM    
Negative for chest pain, palpitations and leg swelling.   Gastrointestinal:  Negative for abdominal pain, nausea and vomiting.   Genitourinary:  Negative for difficulty urinating, dysuria, flank pain and pelvic pain.   Neurological:  Positive for dizziness.       Current Outpatient Medications on File Prior to Visit   Medication Sig Dispense Refill    sacubitril-valsartan (ENTRESTO)  MG per tablet Take 1 tablet by mouth 2 times daily 180 tablet 3    spironolactone (ALDACTONE) 25 MG tablet Take 1 tablet by mouth daily 90 tablet 3    empagliflozin (JARDIANCE) 10 MG tablet Take 1 tablet by mouth daily 90 tablet 3    carvedilol (COREG) 25 MG tablet Take 1 tablet by mouth 2 times daily (with meals) 180 tablet 3    amLODIPine (NORVASC) 5 MG tablet Take 1 tablet by mouth daily 90 tablet 3    rosuvastatin (CRESTOR) 40 MG tablet Take 1 tablet by mouth nightly 90 tablet 3    albuterol sulfate HFA (PROVENTIL;VENTOLIN;PROAIR) 108 (90 Base) MCG/ACT inhaler Inhale 2 puffs into the lungs every 6 hours as needed for Wheezing or Shortness of Breath 1 each 6    aspirin 81 MG chewable tablet Take 1 tablet by mouth daily 90 tablet 1    calcium carbonate (CALCIUM 600) 600 MG TABS tablet Take 1 tablet by mouth daily 90 tablet 1    furosemide (LASIX) 20 MG tablet Take 1 tablet by mouth daily 90 tablet 1    gabapentin (NEURONTIN) 100 MG capsule Take 1 capsule by mouth at bedtime for 360 days. 180 capsule 1    vitamin D3 (CHOLECALCIFEROL) 25 MCG (1000 UT) TABS tablet Take 1 tablet by mouth daily 90 tablet 1    diclofenac sodium (VOLTAREN) 1 % GEL Apply 2 g topically 4 times daily 2 g 2    fluticasone (FLONASE) 50 MCG/ACT nasal spray 1 spray by Each Nostril route daily 16 g 5    zoster recombinant adjuvanted vaccine (SHINGRIX) 50 MCG/0.5ML SUSR injection Inject 0.5 mLs into the muscle See Admin Instructions 1 dose now and repeat in 2-6 months (Patient not taking: Reported on 2/6/2025) 0.5 mL 1     Current Facility-Administered

## 2025-02-10 NOTE — PATIENT INSTRUCTIONS
Thank you for coming to your follow up appointment   Please take your medications as directed and keep your follow up appointment in 2 months.  Call our office if you have any questions or concerns at [phone]      Gabriele Lr MD

## 2025-02-13 ENCOUNTER — TELEPHONE (OUTPATIENT)
Dept: INTERNAL MEDICINE | Age: 60
End: 2025-02-13

## 2025-02-13 NOTE — TELEPHONE ENCOUNTER
Late Entry:    LSW met with pt during during 2.10.25 for financial concerns, especially insurance.  Pt was open and engaged. Pt is single, never  without children; resides alone in rented home, not including electric. Pt's income is from SSDI of $1899 per month and pt presently has [employer] SHAMIR insurance with premium increases.  Pt will be eligible for Medicare on April 1, 2025.  Reviewed options with [employer] financial assistance and [employer] PAP for Entresto of not renewing SHAMIR plan.  Reviewed Medicare Savings Plan information for future planning which pt's income exceeds guidelines.  Discussed differences with traditional Medicare vs advantage plans and provided pt with information re: local Medicare navigators thru [employer]  Advised  pt to call LSW as needed

## 2025-02-14 ENCOUNTER — TELEPHONE (OUTPATIENT)
Dept: CARDIOLOGY CLINIC | Age: 60
End: 2025-02-14

## 2025-02-14 NOTE — TELEPHONE ENCOUNTER
Denial letter scanned to chart re: Entresto.  Records were attached with prior auth request however, I believe it may have been denied because of the need for the documentation of Class of HF (II, III or IV). Please review letter and advise.

## 2025-02-15 NOTE — TELEPHONE ENCOUNTER
I did update my note and added NYHA functional class to my assessment.  We can try again if not I will call them after I come back.

## 2025-02-17 PROBLEM — I50.43 CHF (CONGESTIVE HEART FAILURE), NYHA CLASS I, ACUTE ON CHRONIC, COMBINED (HCC): Chronic | Status: ACTIVE | Noted: 2022-10-30

## 2025-02-18 ENCOUNTER — TRANSCRIBE ORDERS (OUTPATIENT)
Dept: SLEEP CENTER | Age: 60
End: 2025-02-18

## 2025-02-18 DIAGNOSIS — G47.33 OBSTRUCTIVE SLEEP APNEA (ADULT) (PEDIATRIC): Primary | ICD-10-CM

## 2025-03-28 DIAGNOSIS — J30.9 ALLERGIC RHINITIS, UNSPECIFIED SEASONALITY, UNSPECIFIED TRIGGER: ICD-10-CM

## 2025-03-31 RX ORDER — LORATADINE 10 MG/1
10 TABLET ORAL DAILY
Qty: 30 TABLET | Refills: 0 | Status: SHIPPED | OUTPATIENT
Start: 2025-03-31

## 2025-03-31 NOTE — TELEPHONE ENCOUNTER
Name of Medication(s) Requested:  Requested Prescriptions     Pending Prescriptions Disp Refills    loratadine (CLARITIN) 10 MG tablet [Pharmacy Med Name: Loratadine Oral Tablet 10 MG] 30 tablet 0     Sig: TAKE ONE TABLET BY MOUTH DAILY       Medication is on current medication list Yes    Dosage and directions were verified? Yes    Quantity verified: 30 day supply     Pharmacy Verified?  Yes    Last Appointment:  2/10/2025    Future appts:  Future Appointments   Date Time Provider Department Center   4/4/2025 10:00 AM Gabriele Lr MD [facility]   4/7/2025  9:15 AM [facility] ROOM 3 [facility]   8/1/2025  9:20 AM [facility]        (If no appt send self scheduling link. .REFILLAPPT)  Scheduling request sent?     [] Yes  [x] No    Does patient need updated?  [] Yes  [x] No

## 2025-04-24 ENCOUNTER — HOSPITAL ENCOUNTER (OUTPATIENT)
Dept: OTHER | Age: 60
Setting detail: THERAPIES SERIES
Discharge: HOME | End: 2025-04-24
Payer: COMMERCIAL

## 2025-04-24 VITALS
SYSTOLIC BLOOD PRESSURE: 117 MMHG | HEART RATE: 60 BPM | RESPIRATION RATE: 18 BRPM | DIASTOLIC BLOOD PRESSURE: 57 MMHG | WEIGHT: 293 LBS | BODY MASS INDEX: 49.59 KG/M2 | OXYGEN SATURATION: 97 %

## 2025-04-24 LAB
ANION GAP SERPL CALCULATED.3IONS-SCNC: 10 MMOL/L (ref 7–16)
BNP SERPL-MCNC: 81 PG/ML (ref 0–125)
BUN SERPL-MCNC: 17 MG/DL (ref 8–23)
CALCIUM SERPL-MCNC: 9.4 MG/DL (ref 8.8–10.2)
CHLORIDE SERPL-SCNC: 110 MMOL/L (ref 98–107)
CO2 SERPL-SCNC: 22 MMOL/L (ref 22–29)
CREAT SERPL-MCNC: 1.2 MG/DL (ref 0.5–1)
GFR, ESTIMATED: 50 ML/MIN/1.73M2
GLUCOSE SERPL-MCNC: 106 MG/DL (ref 74–99)
POTASSIUM SERPL-SCNC: 4 MMOL/L (ref 3.5–5.1)
SODIUM SERPL-SCNC: 141 MMOL/L (ref 136–145)

## 2025-04-24 PROCEDURE — 99214 OFFICE O/P EST MOD 30 MIN: CPT

## 2025-04-24 PROCEDURE — 80048 BASIC METABOLIC PNL TOTAL CA: CPT

## 2025-04-24 PROCEDURE — 83880 ASSAY OF NATRIURETIC PEPTIDE: CPT

## 2025-04-24 ASSESSMENT — PATIENT HEALTH QUESTIONNAIRE - PHQ9
2. FEELING DOWN, DEPRESSED OR HOPELESS: NOT AT ALL
SUM OF ALL RESPONSES TO PHQ QUESTIONS 1-9: 0
1. LITTLE INTEREST OR PLEASURE IN DOING THINGS: NOT AT ALL
SUM OF ALL RESPONSES TO PHQ QUESTIONS 1-9: 0

## 2025-04-24 NOTE — PROGRESS NOTES
Congestive Heart Failure Clinic   [facility]       Referring Provider: -  Primary Care Physician: Gabriele Lr MD   Cardiologist: Dr. Borja  Nephrologist: N/A      HISTORY OF PRESENT ILLNESS:     Nasra Loyd is a 59 y.o. 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    In the CHF clinic today she denies any adverse symptoms except:  [] Dizziness or lightheadedness   [] Syncope or near syncope  [] Recent Fall  [] Shortness of breath at rest   [x] Dyspnea with exertion  [] Decline in functional capacity (unable to perform activities they had previously been able to do)  [] Fatigue   [] Orthopnea  [] PND  [] Nocturnal cough  [] Hemoptysis  [] Chest pain, pressure, or discomfort  [] Palpitations  [] Abdominal distention  [] Abdominal bloating  [] Early satiety  [] Blood in stool   [] Diarrhea  [] Constipation  [] Nausea/Vomiting  [] Decreased urinary response to oral diuretic   [] Scrotal swelling   [] Lower extremity edema  [] Used PRN doses of oral diuretic   [] Weight gain     Wt Readings from Last 3 Encounters:   04/24/25 135.2 kg (298 lb)   02/10/25 134.5 kg (296 lb 9.6 oz)   02/06/25 134.7 kg (297 lb)           SOCIAL HISTORY:  [x] Denies tobacco, alcohol or illicit drug abuse  [] Tobacco use:  [] ETOH use:  [] Illicit drug use:        MEDICATIONS:    Allergies   Allergen Reactions    Bidil [Isosorb Dinitrate-Hydralazine] Other (See Comments)     Difficulty breathing    Doxycycline Swelling and Other (See Comments)    Iodine Hives     IV dye    Toradol [Ketorolac Tromethamine] Anaphylaxis    Hydralazine     Iodides Hives     Prior to Visit Medications    Medication Sig Taking? Authorizing Provider   loratadine (CLARITIN) 10 MG tablet TAKE ONE TABLET BY MOUTH DAILY Yes

## 2025-05-10 DIAGNOSIS — J30.9 ALLERGIC RHINITIS, UNSPECIFIED SEASONALITY, UNSPECIFIED TRIGGER: ICD-10-CM

## 2025-05-12 RX ORDER — LORATADINE 10 MG/1
10 TABLET ORAL DAILY
Qty: 30 TABLET | Refills: 0 | Status: SHIPPED | OUTPATIENT
Start: 2025-05-12

## 2025-05-12 NOTE — TELEPHONE ENCOUNTER
Name of Medication(s) Requested:  Requested Prescriptions     Pending Prescriptions Disp Refills    loratadine (CLARITIN) 10 MG tablet [Pharmacy Med Name: Loratadine Oral Tablet 10 MG] 30 tablet 0     Sig: TAKE ONE TABLET BY MOUTH DAILY       Medication is on current medication list Yes    Dosage and directions were verified? Yes    Quantity verified: 30 day supply     Pharmacy Verified?  Yes    Last Appointment:  Visit date not found    Future appts:  Future Appointments   Date Time Provider Department Center   7/28/2025  7:00 AM [facility] ROOM 1 [facility]   8/1/2025  9:20 AM Parker Borja MD [facility]        (If no appt send self scheduling link. .REFILLAPPT)  Scheduling request sent?     [] Yes  [x] No    Does patient need updated?  [] Yes  [x] No

## 2025-05-21 DIAGNOSIS — J30.9 ALLERGIC RHINITIS, UNSPECIFIED SEASONALITY, UNSPECIFIED TRIGGER: ICD-10-CM

## 2025-05-21 RX ORDER — LORATADINE 10 MG/1
10 TABLET ORAL DAILY
Qty: 30 TABLET | Refills: 0 | OUTPATIENT
Start: 2025-05-21

## 2025-06-03 ENCOUNTER — TELEPHONE (OUTPATIENT)
Age: 60
End: 2025-06-03

## 2025-06-03 NOTE — TELEPHONE ENCOUNTER
Patient called requesting an appt due to a productive cough a x 1 week that is clear in color. Patient states she had a history of CHF. Patient denies any weight gain but is concerned . Patient given an appt for 6/5. Patient instructed to go directly to the ER if she becomes SOB or if any new symptoms develop. Patient verbalized understanding.

## 2025-06-05 ENCOUNTER — OFFICE VISIT (OUTPATIENT)
Age: 60
End: 2025-06-05
Payer: COMMERCIAL

## 2025-06-05 VITALS
SYSTOLIC BLOOD PRESSURE: 105 MMHG | BODY MASS INDEX: 48.82 KG/M2 | RESPIRATION RATE: 14 BRPM | WEIGHT: 293 LBS | DIASTOLIC BLOOD PRESSURE: 51 MMHG | HEIGHT: 65 IN | OXYGEN SATURATION: 94 % | TEMPERATURE: 97.6 F | HEART RATE: 72 BPM

## 2025-06-05 DIAGNOSIS — I10 PRIMARY HYPERTENSION: ICD-10-CM

## 2025-06-05 DIAGNOSIS — M85.851 OSTEOPENIA OF RIGHT HIP: ICD-10-CM

## 2025-06-05 DIAGNOSIS — J30.9 ALLERGIC RHINITIS, UNSPECIFIED SEASONALITY, UNSPECIFIED TRIGGER: ICD-10-CM

## 2025-06-05 DIAGNOSIS — J06.9 ACUTE UPPER RESPIRATORY INFECTION: Primary | ICD-10-CM

## 2025-06-05 DIAGNOSIS — I50.43 CHF (CONGESTIVE HEART FAILURE), NYHA CLASS I, ACUTE ON CHRONIC, COMBINED (HCC): ICD-10-CM

## 2025-06-05 DIAGNOSIS — G62.9 NEUROPATHY: ICD-10-CM

## 2025-06-05 DIAGNOSIS — E55.9 VITAMIN D DEFICIENCY: ICD-10-CM

## 2025-06-05 PROCEDURE — 99213 OFFICE O/P EST LOW 20 MIN: CPT

## 2025-06-05 PROCEDURE — 3078F DIAST BP <80 MM HG: CPT

## 2025-06-05 PROCEDURE — 3074F SYST BP LT 130 MM HG: CPT

## 2025-06-05 RX ORDER — BROMPHENIRAMINE MALEATE, PSEUDOEPHEDRINE HYDROCHLORIDE, AND DEXTROMETHORPHAN HYDROBROMIDE 2; 30; 10 MG/5ML; MG/5ML; MG/5ML
5 SYRUP ORAL 4 TIMES DAILY PRN
Qty: 118 ML | Refills: 0 | Status: CANCELLED | OUTPATIENT
Start: 2025-06-05

## 2025-06-05 RX ORDER — PREDNISONE 20 MG/1
40 TABLET ORAL DAILY
Qty: 10 TABLET | Refills: 0 | Status: SHIPPED | OUTPATIENT
Start: 2025-06-05 | End: 2025-06-10

## 2025-06-05 RX ORDER — PHENOL 1.4 %
1 AEROSOL, SPRAY (ML) MUCOUS MEMBRANE DAILY
Qty: 90 TABLET | Refills: 1 | Status: SHIPPED | OUTPATIENT
Start: 2025-06-05

## 2025-06-05 RX ORDER — LORATADINE 10 MG/1
10 TABLET ORAL DAILY
Qty: 30 TABLET | Refills: 0 | Status: SHIPPED | OUTPATIENT
Start: 2025-06-05

## 2025-06-05 RX ORDER — FUROSEMIDE 20 MG/1
20 TABLET ORAL DAILY
Qty: 90 TABLET | Refills: 1 | Status: SHIPPED | OUTPATIENT
Start: 2025-06-05

## 2025-06-05 RX ORDER — CHOLECALCIFEROL (VITAMIN D3) 25 MCG
1000 TABLET ORAL DAILY
Qty: 90 TABLET | Refills: 1 | Status: SHIPPED | OUTPATIENT
Start: 2025-06-05

## 2025-06-05 RX ORDER — GUAIFENESIN/DEXTROMETHORPHAN 100-10MG/5
5 SYRUP ORAL 3 TIMES DAILY PRN
Qty: 120 ML | Status: CANCELLED | OUTPATIENT
Start: 2025-06-05 | End: 2025-06-15

## 2025-06-05 RX ORDER — GABAPENTIN 100 MG/1
100 CAPSULE ORAL NIGHTLY
Qty: 180 CAPSULE | Refills: 1 | Status: SHIPPED | OUTPATIENT
Start: 2025-06-05 | End: 2026-05-31

## 2025-06-05 RX ORDER — GUAIFENESIN 600 MG/1
600 TABLET, EXTENDED RELEASE ORAL 2 TIMES DAILY
Qty: 30 TABLET | Refills: 0 | Status: CANCELLED | OUTPATIENT
Start: 2025-06-05 | End: 2025-06-20

## 2025-06-05 RX ORDER — ASPIRIN 81 MG/1
81 TABLET, CHEWABLE ORAL DAILY
Qty: 90 TABLET | Refills: 1 | Status: SHIPPED | OUTPATIENT
Start: 2025-06-05

## 2025-06-05 NOTE — PROGRESS NOTES
[facility]  Internal Medicine Residency Program  ACC Note      CC: Cough (Pt states she has had a cough for a couple weeks. Phlegm is here. )      HPI:Nasra Loyd has a  has a past medical history of Abnormal carotid pulse, Asthma, Depression, Dyslipidemia, Hypertension, Mitral valve prolapse, Obesity, and Osteoarthritis. presented to the [facility] for:    Acute visit    Acute issues:    Cough for 2 week productive, clear mucus  Sore throat, rinorrhea, no fever, no muscle aches.  Inhaler using once/day. Before it was once every 2 weeks  Hx of Emphysema. PFT 2023: No spirometric evidence of obstructive lung disease.  No bronchodilator response. However there is evidence of air trapping, hyperinflation and mildly decreased diffusion capacity suggestive of emphysema.  Prescribed combient inhaler before but not able to aford it  Can talk in full sentences   Mucinex    ASSESSMENT/PLAN:  1. Acute upper respiratory infection  -     predniSONE (DELTASONE) 20 MG tablet; Take 2 tablets by mouth daily for 5 days, Disp-10 tablet, R-0Normal  -     pseudoephedrine-guaiFENesin 38.5-398 MG TABS; Take 1 tablet by mouth 2 times daily for 20 days, Disp-40 tablet, R-0Normal  2. Primary hypertension  -     aspirin 81 MG chewable tablet; Take 1 tablet by mouth daily, Disp-90 tablet, R-1Normal  3. CHF (congestive heart failure), NYHA class I, acute on chronic, combined (HCC)  -     aspirin 81 MG chewable tablet; Take 1 tablet by mouth daily, Disp-90 tablet, R-1Normal  -     furosemide (LASIX) 20 MG tablet; Take 1 tablet by mouth daily, Disp-90 tablet, R-1Normal  4. Vitamin D deficiency  -     calcium carbonate (CALCIUM 600) 600 MG TABS tablet; Take 1 tablet by mouth daily, Disp-90 tablet, R-1Normal  -     vitamin D3 (CHOLECALCIFEROL) 25 MCG (1000 UT) TABS tablet; Take 1 tablet by mouth daily, Disp-90 tablet, R-1Normal  5. Osteopenia of right hip  -     calcium carbonate (CALCIUM 600) 600 MG TABS tablet; Take 1 tablet

## 2025-06-05 NOTE — PATIENT INSTRUCTIONS
Dear Nasra Loyd,        Thank you for coming to your appointment today. I hope we have addressed all of your needs.       Please make sure to do the following:  - Continue your medications as listed.  - Get tests done before our next follow up. We will call you with any abnormal results.   - We will see each other again in 1 month      Have a great day!        Sincerely,  Lara Brown MD  6/5/2025  1:23 PM

## 2025-06-05 NOTE — PROGRESS NOTES
[facility]  Internal Medicine Residency Clinic    Attending Physician Statement  I have discussed the case, including pertinent history and exam findings with the resident physician.  I agree with the assessment, plan and orders as documented by the resident. I have reviewed the relevant PMHx, PSHx, FamHx, SocialHx, medications, and allergies and updated history as appropriate.    Here for 2 weeks of URI symptoms that transitioned to a clear productive cough. She does have PMH of pulmonary emphysema, hypertension, allergic rhinitis.   She has notably increased her rescue inhaler to  daily (from 2x weekly previously) and tried OTC meds. Post-viral syndrome recommend prednisone 5 days and mucinex+pseudoephedrine.    Remainder of medical problems as per resident note.        Bill Coto,   6/5/2025 1:42 PM

## 2025-07-03 ENCOUNTER — OFFICE VISIT (OUTPATIENT)
Age: 60
End: 2025-07-03
Payer: COMMERCIAL

## 2025-07-03 VITALS
SYSTOLIC BLOOD PRESSURE: 107 MMHG | HEIGHT: 65 IN | BODY MASS INDEX: 48.82 KG/M2 | OXYGEN SATURATION: 96 % | RESPIRATION RATE: 18 BRPM | HEART RATE: 74 BPM | WEIGHT: 293 LBS | TEMPERATURE: 97.1 F | DIASTOLIC BLOOD PRESSURE: 67 MMHG

## 2025-07-03 DIAGNOSIS — J43.9 PULMONARY EMPHYSEMA, UNSPECIFIED EMPHYSEMA TYPE (HCC): ICD-10-CM

## 2025-07-03 DIAGNOSIS — G47.33 OSA (OBSTRUCTIVE SLEEP APNEA): Primary | ICD-10-CM

## 2025-07-03 DIAGNOSIS — J45.909 UNCOMPLICATED ASTHMA, UNSPECIFIED ASTHMA SEVERITY, UNSPECIFIED WHETHER PERSISTENT: ICD-10-CM

## 2025-07-03 DIAGNOSIS — J30.9 ALLERGIC RHINITIS, UNSPECIFIED SEASONALITY, UNSPECIFIED TRIGGER: ICD-10-CM

## 2025-07-03 DIAGNOSIS — Z12.31 ENCOUNTER FOR SCREENING MAMMOGRAM FOR MALIGNANT NEOPLASM OF BREAST: ICD-10-CM

## 2025-07-03 DIAGNOSIS — I10 PRIMARY HYPERTENSION: ICD-10-CM

## 2025-07-03 PROCEDURE — 99213 OFFICE O/P EST LOW 20 MIN: CPT

## 2025-07-03 PROCEDURE — 3074F SYST BP LT 130 MM HG: CPT

## 2025-07-03 PROCEDURE — 3078F DIAST BP <80 MM HG: CPT

## 2025-07-03 RX ORDER — LORATADINE 10 MG/1
10 TABLET ORAL DAILY
Qty: 90 TABLET | Refills: 3 | Status: SHIPPED | OUTPATIENT
Start: 2025-07-03

## 2025-07-03 RX ORDER — ALBUTEROL SULFATE 90 UG/1
2 INHALANT RESPIRATORY (INHALATION) EVERY 6 HOURS PRN
Qty: 1 EACH | Refills: 6 | Status: SHIPPED | OUTPATIENT
Start: 2025-07-03

## 2025-07-03 SDOH — ECONOMIC STABILITY: FOOD INSECURITY: WITHIN THE PAST 12 MONTHS, THE FOOD YOU BOUGHT JUST DIDN'T LAST AND YOU DIDN'T HAVE MONEY TO GET MORE.: NEVER TRUE

## 2025-07-03 SDOH — ECONOMIC STABILITY: FOOD INSECURITY: WITHIN THE PAST 12 MONTHS, YOU WORRIED THAT YOUR FOOD WOULD RUN OUT BEFORE YOU GOT MONEY TO BUY MORE.: NEVER TRUE

## 2025-07-03 NOTE — PATIENT INSTRUCTIONS
Dear Nasra Loyd,    Thank you for coming to your appointment at [facility].    Please make sure to do the following:    - Continue medications as listed and contact our office if medications are unavailable at the pharmacy.    - If lab work was ordered please complete as instructed.    - If a referral was placed to another doctor's office, please contact our office in 5 business days if you have not heard from that office regarding the referral.    - If any imaging test was ordered at today's visit (ultrasound, CT scan, or MRI), expect a phone call to schedule in the next 5 business days. You may also call [facility] Scheduling department at [phone] to schedule.    Contact our office if you develop any new or worsening symptoms or if you have any questions regarding today's visit.    We aim to address your healthcare needs to your satisfaction!    Sincerely,  Lara Brown MD  7/3/2025  8:37 AM

## 2025-07-03 NOTE — PROGRESS NOTES
[facility]  Internal Medicine Residency Program  ACC Note      CC: Follow-up, Foot Swelling (Pt complains of both feet swelling for about one week. ), Congestive Heart Failure, and Hypertension      HPI:Nasra Loyd has a  has a past medical history of Abnormal carotid pulse, Asthma, Depression, Dyslipidemia, Hypertension, Mitral valve prolapse, Obesity, and Osteoarthritis. presented to the [facility] for:    2w FU productive cough.    Acute issues:    Cough for 2 week productive, clear mucus  Sore throat, rinorrhea, no fever, no muscle aches.  Inhaler using once/day. Before it was once every 2 weeks  Hx of Emphysema. PFT 2023: No spirometric evidence of obstructive lung disease.  No bronchodilator response. However there is evidence of air trapping, hyperinflation and mildly decreased diffusion capacity suggestive of emphysema.  Prescribed combient inhaler before but not able to aford it  Can talk in full sentences   Improved cough. Not productive anymore. Still taking cortisene when needed.   Says she still gets wheezes at night, has not followed with pulmonary for asthma or emphysematous disease. Referred to pulmonology      Chronic issues:    No changes in medications today     HFimpEF, with mild CAD  - ECHO on 11/1/22 showed EF 40 -45%, mild dilated left ventricle, anterior anterolateral wall hypokinetic, stage II left ventricular diastolic dysfunction  - EF improved to 55-60% with grade I diastolic dysfunction on 6/2024  -Left heart cath on 12/7/2020 showed mild CAD, elevated LVEDP at 25 and uncontrolled blood pressure  -Regularly following with cardiology Dr Borja  On carvedilol 25 mg twice daily, Entresto  mg twice daily, spironolactone 25 mg daily and Jardiance 10 mg daily and on Furosemide 20 mg daily  Continue on aspirin 81 mg daily  Consider starting GLP-1 if insurance allows    Following with Dr. Borja    TIA  - Seen by neurology on 2/4/23 for rt sided paraesthesias and

## 2025-07-03 NOTE — PROGRESS NOTES
[facility]  Internal Medicine Residency Clinic    Attending Physician Statement  I have discussed the case, including pertinent history and exam findings with the resident physician.  I agree with the assessment, plan and orders as documented by the resident. I have reviewed all pertinent PMHx, PSHx, FamHx, SocialHx, medications, and allergies and updated history as appropriate.    Patient here for routine follow up of medical problems.     Acute Asthma Exacerbation   -improved with symptomatic treatment and prednisone   -2/2 viral infection   -with hx of asthma; referral for pulmonology placed     HFimpEF   -DD1; controlled   -on GDMT   -NHYA I   -euvolemic   -EF 50%  -following with cardiology     HCM  -mamogram and Polysomnography at home      Remainder of medical problems as per resident note.    Krishna Felipe Jr, DO  7/3/25

## 2025-07-16 ENCOUNTER — HOSPITAL ENCOUNTER (OUTPATIENT)
Dept: SLEEP CENTER | Age: 60
Discharge: HOME | End: 2025-07-16
Payer: COMMERCIAL

## 2025-07-16 DIAGNOSIS — G47.33 OSA (OBSTRUCTIVE SLEEP APNEA): ICD-10-CM

## 2025-07-16 PROCEDURE — 95800 SLP STDY UNATTENDED: CPT

## 2025-07-22 PROBLEM — G47.33 OSA (OBSTRUCTIVE SLEEP APNEA): Status: ACTIVE | Noted: 2025-07-22

## 2025-07-23 ENCOUNTER — TELEPHONE (OUTPATIENT)
Dept: SLEEP MEDICINE | Age: 60
End: 2025-07-23

## 2025-07-23 NOTE — TELEPHONE ENCOUNTER
----- Message from Dr. Meseret Severino, DO sent at 7/22/2025  1:18 AM EDT -----  Can you please make an appointment to review sleep study results?   Please let me know how long it will take to get her in, if more than a few weeks then I will order the titration.

## 2025-07-23 NOTE — TELEPHONE ENCOUNTER
Call to patient regarding scheduling sleep study titration needing completed and scheduling her a NP appt. Patient agreeable to study being ordered.

## 2025-08-01 ENCOUNTER — OFFICE VISIT (OUTPATIENT)
Dept: CARDIOLOGY CLINIC | Age: 60
End: 2025-08-01

## 2025-08-01 VITALS
HEIGHT: 65 IN | TEMPERATURE: 97.9 F | SYSTOLIC BLOOD PRESSURE: 124 MMHG | RESPIRATION RATE: 18 BRPM | DIASTOLIC BLOOD PRESSURE: 84 MMHG | WEIGHT: 293 LBS | HEART RATE: 66 BPM | BODY MASS INDEX: 48.82 KG/M2 | OXYGEN SATURATION: 96 %

## 2025-08-01 DIAGNOSIS — E66.01 MORBID OBESITY (HCC): ICD-10-CM

## 2025-08-01 DIAGNOSIS — G47.33 OBSTRUCTIVE SLEEP APNEA: ICD-10-CM

## 2025-08-01 DIAGNOSIS — I50.43 CHF (CONGESTIVE HEART FAILURE), NYHA CLASS I, ACUTE ON CHRONIC, COMBINED (HCC): Primary | ICD-10-CM

## 2025-08-01 DIAGNOSIS — I50.32 HEART FAILURE WITH IMPROVED EJECTION FRACTION (HFIMPEF) (HCC): ICD-10-CM

## 2025-08-01 DIAGNOSIS — I10 PRIMARY HYPERTENSION: ICD-10-CM

## 2025-08-01 DIAGNOSIS — E78.00 PURE HYPERCHOLESTEROLEMIA: ICD-10-CM

## 2025-08-01 NOTE — PROGRESS NOTES
first  obtuse marginal branch, a large trifurcating second obtuse marginal  branch, then to a smaller third obtuse marginal branch.  No angiographic  stenosis noted in the circumflex or its branches.     Right coronary artery:  It is medium in size giving rise to a conus  branch, two RV marginal branches, a small PDA and small PLV branch.   There was 50% ostial discrete PDA stenosis.     IMPRESSION:  1.  Mild CAD.  2.  Elevated LVEDP at 25 mmHg.  3.  Uncontrolled blood pressure.    The ASCVD Risk score (Travon CABALLERO, et al., 2019) failed to calculate for the following reasons:    The valid total cholesterol range is 130 to 320 mg/dL    Lipid panel-2/3/2023: Cholesterol 157, triglycerides 71, HDL 66, LDL 77.    Lipid panel-6/10/2024: Cholesterol 128, HDL 64, LDL 50 triglycerides 68   Creatinine 1.1 rest of the electrolytes are normal, proBNP 70    ASSESSMENT:  Chronic heart failure with mid range EF, euvolemic, EF improved and back to 55 to 60%  Nonischemic cardiomyopathy, mild CAD on cardiac cath done in December 2022  ACC/AHA stage C.,  NYHA functional class II.  Hypertension, well controlled, current blood pressure 124/84.   HLD on statin, LDL at goal level  Bronchial asthma  Morbid obesity   Severe GRACE, waiting  for rpt study with C-pap trial   GERD  OA  Vertigo mostly from BPPV    Plan:   Patient on GDMT for LV dysfunction and at goal levels continue Coreg 25 mg p.o. twice daily, Entresto 97/103 mg  p.o. twice daily, Aldactone 25 mg po daily and Jardiance 10 mg p.o. daily.  Continue amlodipine 5 mg p.o. daily for hypertension.    Follow low-salt diet restrict daily salt intake less than 2 g.  Continue furosemide 20 mg p.o. daily and rosuvastatin 40 mg p.o. daily recent lipid panel was reviewed, LDL is at goal level. Last visit  labs in 6/125 advised get another lipid panel with next blood work.   Patient was also recommended to cut down calories and and also walk on a regular basis for weight loss

## 2025-08-01 NOTE — PATIENT INSTRUCTIONS
atient on GDMT for LV dysfunction and at goal levels continue Coreg 25 mg p.o. twice daily, Entresto 97/103 mg  p.o. twice daily, Aldactone 25 mg po daily and Jardiance 10 mg p.o. daily.  Continue amlodipine 5 mg p.o. daily for hypertension.    Follow low-salt diet restrict daily salt intake less than 2 g.  Continue furosemide 20 mg p.o. daily and rosuvastatin 40 mg p.o. daily recent lipid panel was reviewed, LDL is at goal level. Last visit  labs in 6/125 advised get another lipid panel with next blood work.   Patient was also recommended to cut down calories and and also walk on a regular basis for weight loss management.  Follow up with sleep medicine doctor for further testing.   Patient was advised to discuss with her primary care provider regarding GLP-1 agonist such as Ozempic, or Mounjaro for weight loss management.  Follow-up with me in 6 months.

## 2025-08-02 DIAGNOSIS — G47.33 OSA (OBSTRUCTIVE SLEEP APNEA): Primary | ICD-10-CM

## 2025-08-12 ENCOUNTER — HOSPITAL ENCOUNTER (OUTPATIENT)
Dept: OTHER | Age: 60
Setting detail: THERAPIES SERIES
Discharge: HOME | End: 2025-08-12
Payer: COMMERCIAL

## 2025-08-12 VITALS
SYSTOLIC BLOOD PRESSURE: 114 MMHG | BODY MASS INDEX: 54.96 KG/M2 | WEIGHT: 293 LBS | OXYGEN SATURATION: 96 % | DIASTOLIC BLOOD PRESSURE: 66 MMHG | RESPIRATION RATE: 18 BRPM | HEART RATE: 69 BPM

## 2025-08-12 DIAGNOSIS — E66.813 CLASS 3 SEVERE OBESITY DUE TO EXCESS CALORIES WITH SERIOUS COMORBIDITY AND BODY MASS INDEX (BMI) OF 50.0 TO 59.9 IN ADULT (HCC): Primary | ICD-10-CM

## 2025-08-12 LAB
ANION GAP SERPL CALCULATED.3IONS-SCNC: 10 MMOL/L (ref 7–16)
BNP SERPL-MCNC: 163 PG/ML (ref 0–125)
BUN SERPL-MCNC: 14 MG/DL (ref 8–23)
CALCIUM SERPL-MCNC: 9.2 MG/DL (ref 8.8–10.2)
CHLORIDE SERPL-SCNC: 110 MMOL/L (ref 98–107)
CO2 SERPL-SCNC: 22 MMOL/L (ref 22–29)
CREAT SERPL-MCNC: 1.2 MG/DL (ref 0.5–1)
GFR, ESTIMATED: 52 ML/MIN/1.73M2
GLUCOSE SERPL-MCNC: 123 MG/DL (ref 74–99)
POTASSIUM SERPL-SCNC: 4 MMOL/L (ref 3.5–5.1)
SODIUM SERPL-SCNC: 142 MMOL/L (ref 136–145)

## 2025-08-12 PROCEDURE — 83880 ASSAY OF NATRIURETIC PEPTIDE: CPT

## 2025-08-12 PROCEDURE — 80048 BASIC METABOLIC PNL TOTAL CA: CPT

## 2025-08-12 PROCEDURE — 99214 OFFICE O/P EST MOD 30 MIN: CPT

## 2025-08-12 ASSESSMENT — PATIENT HEALTH QUESTIONNAIRE - PHQ9
4. FEELING TIRED OR HAVING LITTLE ENERGY: SEVERAL DAYS
6. FEELING BAD ABOUT YOURSELF - OR THAT YOU ARE A FAILURE OR HAVE LET YOURSELF OR YOUR FAMILY DOWN: NOT AT ALL
SUM OF ALL RESPONSES TO PHQ QUESTIONS 1-9: 7
SUM OF ALL RESPONSES TO PHQ QUESTIONS 1-9: 7
5. POOR APPETITE OR OVEREATING: SEVERAL DAYS
SUM OF ALL RESPONSES TO PHQ QUESTIONS 1-9: 7
SUM OF ALL RESPONSES TO PHQ QUESTIONS 1-9: 7
3. TROUBLE FALLING OR STAYING ASLEEP: MORE THAN HALF THE DAYS
2. FEELING DOWN, DEPRESSED OR HOPELESS: NEARLY EVERY DAY
7. TROUBLE CONCENTRATING ON THINGS, SUCH AS READING THE NEWSPAPER OR WATCHING TELEVISION: NOT AT ALL
9. THOUGHTS THAT YOU WOULD BE BETTER OFF DEAD, OR OF HURTING YOURSELF: NOT AT ALL
8. MOVING OR SPEAKING SO SLOWLY THAT OTHER PEOPLE COULD HAVE NOTICED. OR THE OPPOSITE, BEING SO FIGETY OR RESTLESS THAT YOU HAVE BEEN MOVING AROUND A LOT MORE THAN USUAL: NOT AT ALL
1. LITTLE INTEREST OR PLEASURE IN DOING THINGS: NOT AT ALL
10. IF YOU CHECKED OFF ANY PROBLEMS, HOW DIFFICULT HAVE THESE PROBLEMS MADE IT FOR YOU TO DO YOUR WORK, TAKE CARE OF THINGS AT HOME, OR GET ALONG WITH OTHER PEOPLE: NOT DIFFICULT AT ALL

## 2025-08-28 ENCOUNTER — HOSPITAL ENCOUNTER (OUTPATIENT)
Dept: SLEEP CENTER | Age: 60
Discharge: HOME | End: 2025-08-28
Payer: COMMERCIAL

## 2025-08-28 DIAGNOSIS — G47.33 OSA (OBSTRUCTIVE SLEEP APNEA): ICD-10-CM

## 2025-08-28 PROCEDURE — 95811 POLYSOM 6/>YRS CPAP 4/> PARM: CPT

## 2025-09-03 ENCOUNTER — OFFICE VISIT (OUTPATIENT)
Age: 60
End: 2025-09-03
Payer: COMMERCIAL

## 2025-09-03 VITALS
HEART RATE: 62 BPM | HEIGHT: 64 IN | DIASTOLIC BLOOD PRESSURE: 57 MMHG | SYSTOLIC BLOOD PRESSURE: 103 MMHG | BODY MASS INDEX: 50.02 KG/M2 | TEMPERATURE: 97 F | WEIGHT: 293 LBS

## 2025-09-03 DIAGNOSIS — G47.33 OSA (OBSTRUCTIVE SLEEP APNEA): Primary | ICD-10-CM

## 2025-09-03 DIAGNOSIS — Z13.29 THYROID DISORDER SCREEN: ICD-10-CM

## 2025-09-03 DIAGNOSIS — R73.9 HYPERGLYCEMIA: ICD-10-CM

## 2025-09-03 DIAGNOSIS — E66.813 CLASS 3 SEVERE OBESITY DUE TO EXCESS CALORIES WITH SERIOUS COMORBIDITY AND BODY MASS INDEX (BMI) OF 50.0 TO 59.9 IN ADULT (HCC): ICD-10-CM

## 2025-09-03 PROCEDURE — 3078F DIAST BP <80 MM HG: CPT | Performed by: INTERNAL MEDICINE

## 2025-09-03 PROCEDURE — 3074F SYST BP LT 130 MM HG: CPT | Performed by: INTERNAL MEDICINE

## 2025-09-03 PROCEDURE — 99205 OFFICE O/P NEW HI 60 MIN: CPT | Performed by: INTERNAL MEDICINE
